# Patient Record
Sex: FEMALE | Race: WHITE | HISPANIC OR LATINO | Employment: UNEMPLOYED | URBAN - METROPOLITAN AREA
[De-identification: names, ages, dates, MRNs, and addresses within clinical notes are randomized per-mention and may not be internally consistent; named-entity substitution may affect disease eponyms.]

---

## 2023-11-20 PROBLEM — Z97.5 USES INTRAUTERINE DEVICE FOR BIRTH CONTROL: Status: ACTIVE | Noted: 2020-05-04

## 2023-11-20 PROBLEM — A56.8 CHLAMYDIA TRACHOMATIS INFECTION IN PREGNANCY: Status: ACTIVE | Noted: 2017-09-27

## 2023-11-20 PROBLEM — O26.849 FETAL SIZE INCONSISTENT WITH DATES: Status: ACTIVE | Noted: 2018-03-21

## 2023-11-20 PROBLEM — O98.319 CHLAMYDIA TRACHOMATIS INFECTION IN PREGNANCY: Status: ACTIVE | Noted: 2017-09-27

## 2023-11-20 PROBLEM — A59.00 UROGENITAL INFECTION BY TRICHOMONAS VAGINALIS: Status: ACTIVE | Noted: 2022-04-04

## 2023-11-20 PROBLEM — O20.0 THREATENED ABORTION IN FIRST TRIMESTER: Status: ACTIVE | Noted: 2017-10-04

## 2023-11-20 NOTE — PATIENT INSTRUCTIONS
If you have any questions regarding your visit, Please contact your care team.    [facility]: [phone]      Women s Health CLINIC HOURS TELEPHONE NUMBER   Bridgett Mcgee DO.    TONY Sawant-Surgery Scheduler  Aster - Surgery Scheduler    JOREG LUIS Alejandro, JORGE LUIS Lutz RN     Monday, Thursday  [place]  7am-3pm    Tuesday, Wednesday  [place]  7am-3pm    E/O Friday &   [place]    Typical Surgery Days: Thursday or Friday   [facility]  [address]  [place], [place] [address] [phone] Phone  [phone] Fax    [facility]  [address] [place] [address] [phone] Phone    Imaging [phone] Phone    [facility]:  [phone] Phone     **Surgeries** Our Surgery Schedulers will contact you to schedule. If you do not receive a call within 3 business days, please call [phone].    Urgent Care locations:  [facility] Saturday and    9 am - 5 pm    Monday-Friday   12 pm - 8 pm  Saturday and    9 am - 5 pm   [phone]       If you need a medication refill, please contact your pharmacy. Please allow 3 business days for your refill to be completed.  As always, Thank you for trusting us with your healthcare needs!

## 2023-11-22 ENCOUNTER — OFFICE VISIT (OUTPATIENT)
Dept: OBGYN | Facility: CLINIC | Age: 23
End: 2023-11-22
Payer: COMMERCIAL

## 2023-11-22 VITALS
DIASTOLIC BLOOD PRESSURE: 76 MMHG | SYSTOLIC BLOOD PRESSURE: 122 MMHG | WEIGHT: 157 LBS | BODY MASS INDEX: 27.82 KG/M2 | HEART RATE: 72 BPM | HEIGHT: 63 IN

## 2023-11-22 DIAGNOSIS — A59.9 TRICHOMONAS INFECTION: Primary | ICD-10-CM

## 2023-11-22 DIAGNOSIS — Z30.432 ENCOUNTER FOR REMOVAL OF INTRAUTERINE CONTRACEPTIVE DEVICE: Primary | ICD-10-CM

## 2023-11-22 DIAGNOSIS — Z12.4 CERVICAL CANCER SCREENING: ICD-10-CM

## 2023-11-22 DIAGNOSIS — Z31.9 PATIENT DESIRES PREGNANCY: ICD-10-CM

## 2023-11-22 DIAGNOSIS — Z11.3 SCREENING FOR STDS (SEXUALLY TRANSMITTED DISEASES): ICD-10-CM

## 2023-11-22 LAB
CLUE CELLS: ABNORMAL
TRICHOMONAS, WET PREP: PRESENT
WBC'S/HIGH POWER FIELD, WET PREP: ABNORMAL
YEAST, WET PREP: ABNORMAL

## 2023-11-22 PROCEDURE — 90471 IMMUNIZATION ADMIN: CPT | Performed by: OBSTETRICS & GYNECOLOGY

## 2023-11-22 PROCEDURE — 99203 OFFICE O/P NEW LOW 30 MIN: CPT | Mod: 25 | Performed by: OBSTETRICS & GYNECOLOGY

## 2023-11-22 PROCEDURE — 87591 N.GONORRHOEAE DNA AMP PROB: CPT | Performed by: OBSTETRICS & GYNECOLOGY

## 2023-11-22 PROCEDURE — 87491 CHLMYD TRACH DNA AMP PROBE: CPT | Performed by: OBSTETRICS & GYNECOLOGY

## 2023-11-22 PROCEDURE — 58301 REMOVE INTRAUTERINE DEVICE: CPT | Performed by: OBSTETRICS & GYNECOLOGY

## 2023-11-22 PROCEDURE — G0145 SCR C/V CYTO,THINLAYER,RESCR: HCPCS | Performed by: OBSTETRICS & GYNECOLOGY

## 2023-11-22 PROCEDURE — 90686 IIV4 VACC NO PRSV 0.5 ML IM: CPT | Performed by: OBSTETRICS & GYNECOLOGY

## 2023-11-22 PROCEDURE — 87210 SMEAR WET MOUNT SALINE/INK: CPT | Performed by: OBSTETRICS & GYNECOLOGY

## 2023-11-22 RX ORDER — METRONIDAZOLE 500 MG/1
500 TABLET ORAL 2 TIMES DAILY
Qty: 14 TABLET | Refills: 0 | Status: SHIPPED | OUTPATIENT
Start: 2023-11-22 | End: 2023-11-29

## 2023-11-22 NOTE — PROGRESS NOTES
SUBJECTIVE:       HPI: Lakeshia Phillips is a 23 year old  new patient who presents today for counseling and removal of contraception.     Has had a Mirena IUD in place for 5  years.  Desires removal for pregnancy planning.        Gyn Hx: No LMP recorded. (Menstrual status: IUD).    Patient is sexually active. One male partner   [place] pap [place]   STI history - hx chlamydia  , , ; trich   STD testing offered?  Accepted  Menarche 13 years old. No bleeding with IUD      OB History    Para Term  AB Living   1 1 1 0 0 1   SAB IAB Ectopic Multiple Live Births   0 0 0 0 1      # Outcome Date GA Lbr Jose/2nd Weight Sex Delivery Anes PTL Lv   1 Term 2018        NAMITA         reports that she has never smoked. She has never used smokeless tobacco.      Today's PHQ-2 Score:       2023     2:21 PM   PHQ-2 (  [employer])   Q1: Little interest or pleasure in doing things 1   Q2: Feeling down, depressed or hopeless 0   PHQ-2 Score 1   Q1: Little interest or pleasure in doing things Several days   Q2: Feeling down, depressed or hopeless Not at all   PHQ-2 Score 1     Today's PHQ-9 Score:        No data to display              Today's ISELA-7 Score:        No data to display                Problem list and histories reviewed & adjusted, as indicated.  Additional history: as documented.    Patient Active Problem List   Diagnosis    Chlamydia trachomatis infection in pregnancy    Fetal size inconsistent with dates    Threatened  in first trimester    Urogenital infection by trichomonas vaginalis    Uses intrauterine device for birth control     Past Surgical History:   Procedure Laterality Date    APPENDECTOMY  2002      Social History     Tobacco Use    Smoking status: Never    Smokeless tobacco: Never   Substance Use Topics    Alcohol use: Not Currently      No data available              No current outpatient medications on file prior to visit.  No current  "facility-administered medications on file prior to visit.    Allergies   Allergen Reactions    Tramadol Other (See Comments)     Anxiety/panic attacks       ROS:  10 Point review of systems negative other noted above in HPI    OBJECTIVE:     /76   Pulse 72   Ht 1.6 m (5' 3\")   Wt 71.2 kg (157 lb)   BMI 27.81 kg/m    Body mass index is 27.81 kg/m .      Gen: Alert, oriented, appropriately interactive, NAD  Chest: Symmetrical, unlabored breathing  External genitalia: no lesions; normal appearing external genitalia, bartholins glands, urethra, skenes glands  Vagina: no masses or lesions or discharge, normally rugated.  Cervix: no masses or lesions or discharge +IUD strings  MSK: normal gait, symmetric movements UE & LE  Lower extremities: non-tender, no edema      In-Clinic Test Results:  No results found for this or any previous visit (from the past 24 hour(s)).    ASSESSMENT/PLAN:                                                      Lakeshia Phillips is a 23 year old  New patient who presents today counseling and initiation of contraception      ICD-10-CM    1. Encounter for removal of intrauterine contraceptive device  Z30.432 REMOVE INTRAUTERINE DEVICE      2. Screening for STDs (sexually transmitted diseases)  Z11.3 Chlamydia trachomatis/Neisseria gonorrhoeae by PCR - Clinic Collect     Wet prep - Clinic Collect      3. Cervical cancer screening  Z12.4 Pap Screen only - recommended age 21 - 24 years      4. Patient desires pregnancy  Z31.9 REMOVE INTRAUTERINE DEVICE          Desires pregnancy and removal of IUD. No issues except some mild cramping recently.    Mirena IUD removed today without complication. Please see procedure note below for more information.    Recommend starting PNV with folic acid. Call with +UPT.    Pap obtained, records from prior screening not available.    Hx STI- repeat screening today.      Bridgett Mcgee,   [facility]      IUD " Removal:  SUBJECTIVE:    Is a pregnancy test required: No.  Was a consent obtained?  Yes    Lakeshia Phillips is a 23 year old female,, No LMP recorded. (Menstrual status: IUD). who presents today for IUD removal.     Today's PHQ-2 Score:       2023     2:21 PM   PHQ-2 (  [employer])   Q1: Little interest or pleasure in doing things 1   Q2: Feeling down, depressed or hopeless 0   PHQ-2 Score 1   Q1: Little interest or pleasure in doing things Several days   Q2: Feeling down, depressed or hopeless Not at all   PHQ-2 Score 1       PROCEDURE:    A speculum exam was performed and the cervix was visualized. The IUD string was visualized. Using ring forceps, the string  was grasped and the IUD removed intact.    POST PROCEDURE:    The patient tolerated the procedure well. Patient was discharged in stable condition.    Call if bleeding, pain or fever occur. and Pregnancy counseling given, including folic acid supplementation 800-1000 mg per day.    Bridgett Mcgee,

## 2023-11-23 LAB
C TRACH DNA SPEC QL PROBE+SIG AMP: POSITIVE
N GONORRHOEA DNA SPEC QL NAA+PROBE: NEGATIVE

## 2023-11-24 ENCOUNTER — TELEPHONE (OUTPATIENT)
Dept: OBGYN | Facility: CLINIC | Age: 23
End: 2023-11-24
Payer: COMMERCIAL

## 2023-11-24 DIAGNOSIS — A74.9 CHLAMYDIA INFECTION: Primary | ICD-10-CM

## 2023-11-24 DIAGNOSIS — A74.9 CHLAMYDIA INFECTION: ICD-10-CM

## 2023-11-24 RX ORDER — AZITHROMYCIN 500 MG/1
1000 TABLET, FILM COATED ORAL DAILY
Qty: 2 TABLET | Refills: 0 | Status: SHIPPED | OUTPATIENT
Start: 2023-11-24 | End: 2024-03-08

## 2023-11-24 RX ORDER — AZITHROMYCIN 500 MG/1
1000 TABLET, FILM COATED ORAL DAILY
Qty: 2 TABLET | Refills: 0 | Status: SHIPPED | OUTPATIENT
Start: 2023-11-24 | End: 2023-11-24

## 2023-11-24 NOTE — TELEPHONE ENCOUNTER
See result note for chlamydia test- RN transferred script per pt request to [facility] and cancelled script at  [facility].    Nelda Vázquez RN on 11/24/2023 at 10:14 AM

## 2023-11-28 LAB
BKR LAB AP GYN ADEQUACY: NORMAL
BKR LAB AP GYN INTERPRETATION: NORMAL
BKR LAB AP GYN OTHER FINDINGS: NORMAL
BKR LAB AP HPV REFLEX: NO
BKR LAB AP PREVIOUS ABNORMAL: NORMAL
PATH REPORT.COMMENTS IMP SPEC: NORMAL
PATH REPORT.COMMENTS IMP SPEC: NORMAL
PATH REPORT.RELEVANT HX SPEC: NORMAL

## 2023-12-31 ENCOUNTER — HEALTH MAINTENANCE LETTER (OUTPATIENT)
Age: 23
End: 2023-12-31

## 2024-01-17 ENCOUNTER — TELEPHONE (OUTPATIENT)
Dept: OBGYN | Facility: CLINIC | Age: 24
End: 2024-01-17
Payer: MEDICAID

## 2024-01-17 NOTE — TELEPHONE ENCOUNTER
LMP 12/25/23  Yesterday took a home pregnancy test.  Would like to schedule first ob appointments.    Patient will call and schedule.

## 2024-02-05 ENCOUNTER — MEDICAL CORRESPONDENCE (OUTPATIENT)
Dept: HEALTH INFORMATION MANAGEMENT | Facility: CLINIC | Age: 24
End: 2024-02-05

## 2024-02-08 ENCOUNTER — VIRTUAL VISIT (OUTPATIENT)
Dept: FAMILY MEDICINE | Facility: CLINIC | Age: 24
End: 2024-02-08

## 2024-02-08 DIAGNOSIS — Z34.90 SUPERVISION OF NORMAL PREGNANCY: Primary | ICD-10-CM

## 2024-02-08 PROCEDURE — 99207 PR NO CHARGE NURSE ONLY: CPT | Mod: 93

## 2024-02-08 NOTE — PATIENT INSTRUCTIONS
Learning About Pregnancy  Your Care Instructions     Your health in the early weeks of your pregnancy is particularly important for your baby's health. Take good care of yourself. Anything you do that harms your body can also harm your baby.  Make sure to go to all of your doctor appointments. Regular checkups will help keep you and your baby healthy.  How can you care for yourself at home?  Diet    Eat a balanced diet. Make sure your diet includes plenty of beans, peas, and leafy green vegetables.     Do not skip meals or go for many hours without eating. If you are nauseated, try to eat a small, healthy snack every 2 to 3 hours.     Do not eat fish that has a high level of mercury, such as shark, swordfish, or mackerel. Do not eat more than one can of tuna each week.     Drink plenty of fluids. If you have kidney, heart, or liver disease and have to limit fluids, talk with your doctor before you increase the amount of fluids you drink.     Cut down on caffeine, such as coffee, tea, and cola.     Do not drink alcohol, such as beer, wine, or hard liquor.     Take a multivitamin that contains at least 400 micrograms (mcg) of folic acid to help prevent birth defects. Fortified cereal and whole wheat bread are good additional sources of folic acid.     Increase the calcium in your diet. Try to drink a quart of skim milk each day. You may also take calcium supplements and choose foods such as cheese and yogurt.   Lifestyle    Make sure you go to your follow-up appointments.     Get plenty of rest. You may be unusually tired while you are pregnant.     Get at least 30 minutes of exercise on most days of the week. Walking is a good choice. If you have not exercised in the past, start out slowly. Take several short walks each day.     Do not smoke. If you need help quitting, talk to your doctor about stop-smoking programs. These can increase your chances of quitting for good.     Do not touch cat feces or litter boxes.  Also, wash your hands after you handle raw meat, and fully cook all meat before you eat it. Wear gloves when you work in the yard or garden, and wash your hands well when you are done. Cat feces, raw or undercooked meat, and contaminated dirt can cause an infection that may harm your baby or lead to a miscarriage.     Avoid things that can make your body too hot and may be harmful to your baby, such as a hot tub or sauna. Or talk with your doctor before doing anything that raises your body temperature. Your doctor can tell you if it's safe.     Avoid chemical fumes, paint fumes, or poisons.     Do not use illegal drugs, marijuana, or alcohol.   Medicines    Review all of your medicines with your doctor. Some of your routine medicines may need to be changed to protect your baby.     Use acetaminophen (Tylenol) to relieve minor problems, such as a mild headache or backache or a mild fever with cold symptoms. Do not use nonsteroidal anti-inflammatory drugs (NSAIDs), such as ibuprofen (Advil, Motrin) or naproxen (Aleve), unless your doctor says it is okay.     Do not take two or more pain medicines at the same time unless the doctor told you to. Many pain medicines have acetaminophen, which is Tylenol. Too much acetaminophen (Tylenol) can be harmful.     Take your medicines exactly as prescribed. Call your doctor if you think you are having a problem with your medicine.   To manage morning sickness    If you feel sick when you first wake up, try eating a small snack (such as crackers) before you get out of bed. Allow some time to digest the snack, and then get out of bed slowly.     Do not skip meals or go for long periods without eating. An empty stomach can make nausea worse.     Eat small, frequent meals instead of three large meals each day.     Drink plenty of fluids.     Eat foods that are high in protein but low in fat.     If you are taking iron supplements, ask your doctor if they are necessary. Iron can make  "nausea worse.     Avoid any smells, such as coffee, that make you feel sick.     Get lots of rest. Morning sickness may be worse when you are tired.   Follow-up care is a key part of your treatment and safety. Be sure to make and go to all appointments, and call your doctor if you are having problems. It's also a good idea to know your test results and keep a list of the medicines you take.  Where can you learn more?  Go to [email or web address]/patiented  Enter E868 in the search box to learn more about \"Learning About Pregnancy.\"  Current as of: July 11, 2023               Content Version: 13.8    4314-4949 [employer].   Care instructions adapted under license by your healthcare professional. If you have questions about a medical condition or this instruction, always ask your healthcare professional. [employer] disclaims any warranty or liability for your use of this information.      Weeks 6 to 10 of Your Pregnancy: Care Instructions  During these weeks of pregnancy, your body goes through many changes. You may start to feel different, both in your body and your emotions. Each pregnancy is different, so there's no \"right\" way to feel. These early weeks are a time to make healthy choices for you and your pregnancy.    Take a daily prenatal vitamin. Choose one with folic acid in it.   Avoid alcohol, tobacco, and drugs (including marijuana). If you need help quitting, talk to your doctor.     Drink plenty of liquids.  Be sure to drink enough water. And limit sodas, other sweetened drinks, and caffeine.     Choose foods that are good sources of calcium, iron, and folate.  You can try dairy products, dark leafy greens, fortified orange juice and cereals, almonds, broccoli, dried fruit, and beans.     Avoid foods that may be harmful.  Don't eat raw meat, deli meat, raw seafood, or raw eggs. Avoid soft cheese and unpasteurized dairy, like Brie and blue cheese. And don't eat fish that " "contains a lot of mercury, like shark and swordfish.     Don't touch collin litter or cat poop.  They can cause an infection that could be harmful during pregnancy.     Avoid things that can make your body too hot.  For example, avoid hot tubs and saunas.     Soothe morning sickness.  Try eating 5 or 6 small meals a day, getting some fresh air, or using gaby to control symptoms.     Ask your doctor about flu and COVID-19 shots.  Getting them can help protect against infection.   Follow-up care is a key part of your treatment and safety. Be sure to make and go to all appointments, and call your doctor if you are having problems. It's also a good idea to know your test results and keep a list of the medicines you take.  Where can you learn more?  Go to [email or web address]  Enter G112 in the search box to learn more about \"Weeks 6 to 10 of Your Pregnancy: Care Instructions.\"  Current as of: July 11, 2023               Content Version: 13.8 2006-2023 [employer].   Care instructions adapted under license by your healthcare professional. If you have questions about a medical condition or this instruction, always ask your healthcare professional. [employer] disclaims any warranty or liability for your use of this information.         Managing Morning Sickness (01:55)  Your health professional recommends that you watch this short online health video.  Learn tips for dealing with morning sickness, no matter what time of day you have it.  Purpose:  Gives tips for managing morning sickness, including eating small low-fat meals and avoiding caffeine and spicy food.  Goal:  The user will learn tips for dealing with morning sickness during pregnancy.     How to watch the video    Scan the QR code   OR Visit the website    [email or web address]   Current as of: July 11, 2023               Content Version: 13.8 2006-2023 [employer].   Care " instructions adapted under license by your healthcare professional. If you have questions about a medical condition or this instruction, always ask your healthcare professional. [employer] disclaims any warranty or liability for your use of this information.      Pregnancy and Heartburn: Care Instructions  Overview     Heartburn is a common problem during pregnancy.  Heartburn happens when stomach acid backs up into the tube that carries food to the stomach. This tube is called the esophagus. Early in pregnancy, heartburn is caused by hormone changes that slow down digestion. Later on, it's also caused by the large uterus pushing up on the stomach.  Even though you can't fix the cause, there are things you can do to get relief. Treating heartburn during pregnancy focuses first on making lifestyle changes, like changing what and how you eat, and on taking medicines.  Heartburn usually improves or goes away after childbirth.  Follow-up care is a key part of your treatment and safety. Be sure to make and go to all appointments, and call your doctor if you are having problems. It's also a good idea to know your test results and keep a list of the medicines you take.  How can you care for yourself at home?  Eat small, frequent meals.  Avoid foods that make your symptoms worse, such as chocolate, peppermint, and spicy foods. Avoid drinks with caffeine, such as coffee, tea, and sodas.  Avoid bending over or lying down after meals.  Take a short walk after you eat.  If heartburn is a problem at night, do not eat for 2 hours before bedtime.  Take antacids like Mylanta, Maalox, Rolaids, or Tums. Do not take antacids that have sodium bicarbonate, magnesium trisilicate, or aspirin. Be careful when you take over-the-counter antacid medicines. Many of these medicines have aspirin in them. While you are pregnant, do not take aspirin or medicines that contain aspirin unless your doctor says it is okay.  If you're not  "getting relief, talk to your doctor. You may be able to take a stronger acid-reducing medicine.  When should you call for help?   Call your doctor now or seek immediate medical care if:    You have new or worse belly pain.     You are vomiting.   Watch closely for changes in your health, and be sure to contact your doctor if:    You have new or worse symptoms of reflux.     You are losing weight.     You have trouble or pain swallowing.     You do not get better as expected.   Where can you learn more?  Go to [email or web address]://[email or web address]/[email or web address]  Enter U946 in the search box to learn more about \"Pregnancy and Heartburn: Care Instructions.\"  Current as of: July 11, 2023               Content Version: 13.8    9070-6495 [employer]   Care instructions adapted under license by your healthcare professional. If you have questions about a medical condition or this instruction, always ask your healthcare professional. [employer]([facility] disclaims any warranty or liability for your use of this information.      Constipation: Care Instructions  Overview     Constipation means that you have a hard time passing stools (bowel movements). People pass stools from 3 times a day to once every 3 days. What is normal for you may be different. Constipation may occur with pain in the rectum and cramping. The pain may get worse when you try to pass stools. Sometimes there are small amounts of bright red blood on toilet paper or the surface of stools. This is because of enlarged veins near the rectum (hemorrhoids).  A few changes in your diet and lifestyle may help you avoid ongoing constipation. Your doctor may also prescribe medicine to help loosen your stool.  Some medicines can cause constipation. These include pain medicines and antidepressants. Tell your doctor about all the medicines you take. Your doctor may want to make a medicine change to ease your symptoms.  Follow-up care is a key part of your treatment " "and safety. Be sure to make and go to all appointments, and call your doctor if you are having problems. It's also a good idea to know your test results and keep a list of the medicines you take.  How can you care for yourself at home?  Drink plenty of fluids. If you have kidney, heart, or liver disease and have to limit fluids, talk with your doctor before you increase the amount of fluids you drink.  Include high-fiber foods in your diet each day. These include fruits, vegetables, beans, and whole grains.  Get at least 30 minutes of exercise on most days of the week. Walking is a good choice. You also may want to do other activities, such as running, swimming, cycling, or playing tennis or team sports.  Take a fiber supplement, such as Citrucel or Metamucil, every day. Read and follow all instructions on the label.  Schedule time each day for a bowel movement. A daily routine may help. Take your time having a bowel movement, but don't sit for more than 10 minutes at a time. And don't strain too much.  Support your feet with a small step stool when you sit on the toilet. This helps flex your hips and places your pelvis in a squatting position.  Your doctor may recommend an over-the-counter laxative to relieve your constipation. Examples are Milk of Magnesia and MiraLax. Read and follow all instructions on the label. Do not use laxatives on a long-term basis.  When should you call for help?   Call your doctor now or seek immediate medical care if:    You have new or worse belly pain.     You have new or worse nausea or vomiting.     You have blood in your stools.   Watch closely for changes in your health, and be sure to contact your doctor if:    Your constipation is getting worse.     You do not get better as expected.   Where can you learn more?  Go to https://www.healthwise.net/patiented  Enter P343 in the search box to learn more about \"Constipation: Care Instructions.\"  Current as of: March 21, " "2023               Content Version: 13.8 2006-2023 [employer].   Care instructions adapted under license by your healthcare professional. If you have questions about a medical condition or this instruction, always ask your healthcare professional. [employer] disclaims any warranty or liability for your use of this information.      Learning About High-Iron Foods  What foods are high in iron?     The foods you eat contain nutrients, such as vitamins and minerals. Iron is a nutrient. Your body needs the right amount to stay healthy and work as it should. You can use the list below to help you make choices about which foods to eat.  Here are some foods that contain iron. They have 1 to 2 milligrams of iron per serving.  Fruits  Figs (dried), 5 figs  Vegetables  Asparagus (canned), 6 bo  Juanis, beet, Swiss chard, or turnip greens, 1 cup  Dried peas, cooked,   cup  Seaweed, spirulina (dried),   cup  Spinach, (cooked)   cup or (raw) 1 cup  Grains  Cereals, fortified with iron, 1 cup  Grits (instant, cooked), fortified with iron,   cup  Meats and other protein foods  Beans (kidney, lima, navy, white), canned or cooked,   cup  Beef or lamb, 3 oz  Chicken giblets, 3 oz  Chickpeas (garbanzo beans),   cup  Liver of beef, lamb, or pork, 3 oz  Oysters (cooked), 3 oz  Sardines (canned), 3 oz  Soybeans (boiled),   cup  Tofu (firm),   cup  Work with your doctor to find out how much of this nutrient you need. Depending on your health, you may need more or less of it in your diet.  Where can you learn more?  Go to [email or web address]/[email or web address]  Enter R005 in the search box to learn more about \"Learning About High-Iron Foods.\"  Current as of: February 28, 2023               Content Version: 13.8 2006-2023 [employer].   Care instructions adapted under license by your healthcare professional. If you have questions about a medical condition or this instruction, always ask your " "healthcare professional. [employer], [facility] disclaims any warranty or liability for your use of this information.      Rh Antibodies Screening During Pregnancy: About This Test  What is it?     The Rh antibodies screening test is a blood test. It checks your blood for Rh antibodies. If you have Rh-negative blood and have been exposed to Rh-positive blood, your immune system may make antibodies to attack the Rh-positive blood. When a pregnant woman has these antibodies, it is called Rh sensitization.  Why is this test done?  The Rh antibodies screening test is done during pregnancy to find out if your baby is at risk for Rh disease. This can happen if you have Rh-negative blood and your baby has Rh-positive blood. If your Rh-negative blood mixes with Rh-positive blood, your immune system will make antibodies to attack the Rh-positive blood.  During pregnancy, these antibodies could attach to the baby's red blood cells. This can cause your baby to have serious health problems. The results of this test will help your doctor know how to best care for you and your baby during your pregnancy.  How do you prepare for the test?  In general, there's nothing you have to do before this test, unless your doctor tells you to.  How is the test done?  A health professional uses a needle to take a blood sample, usually from the arm.  What happens after the test?  You will probably be able to go home right away. It depends on the reason for the test.  You can go back to your usual activities right away.  Follow-up care is a key part of your treatment and safety. Be sure to make and go to all appointments, and call your doctor if you are having problems. It's also a good idea to keep a list of the medicines you take. Ask your doctor when you can expect to have your test results.  Where can you learn more?  Go to https://www.[employer].net/patiented  Enter P722 in the search box to learn more about \"Rh Antibodies Screening " "During Pregnancy: About This Test.\"  Current as of: 2023               Content Version: 13.8    0360-7292 [employer].   Care instructions adapted under license by your healthcare professional. If you have questions about a medical condition or this instruction, always ask your healthcare professional. [employer] disclaims any warranty or liability for your use of this information.      Learning About Preventing Rh Disease  What is Rh disease?     Rh disease can be a serious problem in pregnancy. It happens when substances called antibodies in the mother's blood cause red blood cells in her baby's blood to be destroyed. This can occur when the blood types of a mother and her baby do not match.  All blood has an Rh factor. This is what makes a blood type positive or negative. When you are Rh-negative, your baby may be Rh-negative or Rh-positive. If your baby has Rh-positive blood and it mixes with yours, your body will make antibodies. This is called Rh sensitization.  Most of the time, this is not a problem in a first pregnancy. But in future pregnancies, it could cause Rh disease.  A  with Rh disease has mild anemia and may have jaundice. In severe cases, anemia, jaundice, and swelling can be very dangerous or fatal. Some babies need to be delivered early. Some need special care in the NICU. A very sick baby will need a blood transfusion before or after birth.  Fortunately, Rh sensitization is usually easy to prevent.  That's why it's important to get your Rh status checked in your first trimester. It doesn't cause any warning signs. A blood test is the only way to know if you are Rh-sensitive or are at risk for it.  How can you prevent Rh disease?  If you are Rh-negative, your doctor gives you an Rh immune globulin shot (such as RhoGAM). It helps prevent your body from making the antibodies that attack your baby's red blood cells.  Timing is important. You need the shot " "at certain times during your pregnancy. And you need one anytime there is a chance that your baby's blood might mix with yours. That can happen with certain prenatal tests or when you have pregnancy bleeding, such as:  Right after any pregnancy loss, amniocentesis, or CVS testing.  After turning of a breech baby.  Before and maybe after childbirth. Your doctor gives you a shot around week 28. If your  is Rh-positive, you will have another shot.  Follow-up care is a key part of your treatment and safety. Be sure to make and go to all appointments, and call your doctor if you are having problems. It's also a good idea to know your test results and keep a list of the medicines you take.  Where can you learn more?  Go to https://[email or web address]  Enter W177 in the search box to learn more about \"Learning About Preventing Rh Disease.\"  Current as of: 2023               Content Version: 13.8    9681-5781 [employer].   Care instructions adapted under license by your healthcare professional. If you have questions about a medical condition or this instruction, always ask your healthcare professional. [employer] disclaims any warranty or liability for your use of this information.      Learning About Rh Immunoglobulin Shots  Introduction     An Rh immunoglobulin shot is given to pregnant women who have Rh-negative blood.  You may have Rh-negative blood, and your baby may have Rh-positive blood. If the two types of blood mix, your body will make antibodies. This is called Rh sensitization. Most of the time, this is not a problem the first time you're pregnant. But it could cause problems in future pregnancies.  This shot keeps your body from making the antibodies. You get the shot around 28 weeks of pregnancy. After the birth, your baby's blood is tested. If the blood is Rh positive, you will get another shot. You may also get the shot if you have vaginal bleeding while " "you are pregnant or if you have a miscarriage. These shots protect future pregnancies.  Women with Rh negative blood will need this shot each time they get pregnant.  Example  Rh immunoglobulin (HypRho-D, MICRhoGAM, and RhoGAM)  Possible side effects  Rare side effects may include:  Some mild pain where you got the shot.  A slight fever.  An allergic reaction.  You may have other side effects not listed here. Check the information that comes with your medicine.  What to know about taking this medicine  You may need more than one shot. You may need the shot again:  After amniocentesis, fetal blood sampling, or chorionic villus sampling tests.  If you have bleeding in your second or third trimester.  After turning of a breech baby.  After an injury to the belly while you are pregnant.  After a miscarriage or an .  Before or right after treatment for an ectopic or a partial molar pregnancy.  Tell your doctor if you have any allergies or have had a bad response to medicines in the past.  If you get this shot within 3 months of getting a live-virus vaccine, the vaccine may not work. Your doctor will tell you if you need more vaccine.  Check with your doctor or pharmacist before you use any other medicines. This includes over-the-counter medicines. Make sure your doctor knows all of the medicines, vitamins, herbs, and supplements you take. Taking some medicines at the same time can cause problems.  Where can you learn more?  Go to https://www.[employer].net/patiented  Enter V615 in the search box to learn more about \"Learning About Rh Immunoglobulin Shots.\"  Current as of: 2023               Content Version: 13.8    9938-1167 [employer].   Care instructions adapted under license by your healthcare professional. If you have questions about a medical condition or this instruction, always ask your healthcare professional. [employer] disclaims any warranty or liability for your use " of this information.      Rubella (Cro[place] Measles): Care Instructions  Overview  Rubella, also called Croatian measles or 3-day measles, is a disease caused by a virus. It spreads by coughs, sneezes, and close contact. Rubella usually is mild and does not cause long-term problems. But if you are pregnant and get it, you can give the disease to your unborn baby. This can cause serious birth defects.  While you have rubella, you may get a rash and a mild fever, and the lymph glands in your neck may swell. Older children often have a fever, eye pain, a sore throat, and body aches. You can relieve most symptoms with care at home. Avoid being around others, especially pregnant people, until your rash has been gone for at least 4 days. People who have not had this disease before or have not had the vaccine have the greatest chance of getting the virus.  Follow-up care is a key part of your treatment and safety. Be sure to make and go to all appointments, and call your doctor if you are having problems. It's also a good idea to know your test results and keep a list of the medicines you take.  How can you care for yourself at home?  Drink plenty of fluids. If you have kidney, heart, or liver disease and have to limit fluids, talk with your doctor before you increase the amount of fluids you drink.  Get plenty of rest to help your body heal.  Take an over-the-counter pain medicine, such as acetaminophen (Tylenol), ibuprofen (Advil, Motrin), or naproxen (Aleve), to reduce fever and discomfort. Read and follow all instructions on the label. Do not give aspirin to anyone younger than 20. It has been linked to Reye syndrome, a serious illness.  Do not take two or more pain medicines at the same time unless the doctor told you to. Many pain medicines have acetaminophen, which is Tylenol. Too much acetaminophen (Tylenol) can be harmful.  Try not to scratch the rash. Put cold, wet cloths on the rash to reduce itching.  Do not smoke.  "Smoking can make your symptoms worse. If you need help quitting, talk to your doctor about stop-smoking programs and medicines. These can increase your chances of quitting for good.  Avoid contact with people who have never had rubella and who have not been immunized.  When should you call for help?   Call your doctor now or seek immediate medical care if:    You have a fever with a stiff neck or a severe headache.     You are sensitive to light or feel very sleepy or confused.   Watch closely for changes in your health, and be sure to contact your doctor if:    You do not get better as expected.   Where can you learn more?  Go to https://www.[employer].net/patiented  Enter B812 in the search box to learn more about \"Rubella (Arabic Measles): Care Instructions.\"  Current as of: 2023               Content Version: 13.8    0302-8490 [employer].   Care instructions adapted under license by your healthcare professional. If you have questions about a medical condition or this instruction, always ask your healthcare professional. [employer] disclaims any warranty or liability for your use of this information.      Gonorrhea and Chlamydia: About These Tests  What is it?  These tests use a sample of urine or other body fluid to look for the bacteria that cause these sexually transmitted infections (STIs). The fluid sample can come from the cervix, vagina, rectum, throat, or eyes.  Why is this test done?  These tests may be done to:  Find out if symptoms are caused by gonorrhea or chlamydia.  Check people who are at high risk of being infected with gonorrhea or chlamydia.  Retest people several months after they have been treated for gonorrhea or chlamydia.  Check for infection in your  if you had a gonorrhea or chlamydia infection at the time of delivery.  How can you prepare for the test?  If you are going to have a urine test, do not urinate for at least 1 hour before the " "test.  If you think you may have chlamydia or gonorrhea, don't have sexual intercourse until you get your test results. And you may want to have tests for other STIs, such as HIV.  How is the test done?  For a direct sample, a swab is used to collect body fluid from the cervix, vagina, rectum, throat, or eyes. Your doctor may collect the sample. Or you may be given instructions on how to collect your own sample.  For a urine sample, you will collect the urine that comes out when you first start to urinate. Don't wipe the genital area clean before you urinate.  How long does the test take?  The test will take a few minutes.  What happens after the test?  You will be able to go home right away.  You can go back to your usual activities right away.  If you do have an infection, don't have sexual intercourse for 7 days after you start treatment. And your sex partner(s) should also be treated.  Follow-up care is a key part of your treatment and safety. Be sure to make and go to all appointments, and call your doctor if you are having problems. It's also a good idea to keep a list of the medicines you take. Ask your doctor when you can expect to have your test results.  Where can you learn more?  Go to https://www.[email or web address]/patiented  Enter K976 in the search box to learn more about \"Gonorrhea and Chlamydia: About These Tests.\"  Current as of: April 19, 2023               Content Version: 13.8    2373-9615 [employer].   Care instructions adapted under license by your healthcare professional. If you have questions about a medical condition or this instruction, always ask your healthcare professional. [employer] disclaims any warranty or liability for your use of this information.      Trichomoniasis: About This Test  What is it?     This test uses a sample of urine or other body fluid to look for the tiny parasite that causes trichomoniasis (also called trich). The fluid sample can come " from the vagina, cervix, or urethra. Your doctor may choose to use one or more of many available tests.  Why is it done?  A trich test may be done to:  Find out if symptoms are caused by trich.  Check people who are at high risk for being infected with trich.  Check after treatment to make sure that the infection is gone.  How do you prepare for the test?  If you are going to have a urine test, do not urinate for at least 1 hour before the test.  How is the test done?  For a direct sample, a swab is used to collect body fluid from the cervix, vagina, or urethra. Your doctor may collect the sample. Or you may be given instructions on how to collect your own sample.  For a urine sample, you will collect the urine that comes out when you first start to urinate. Don't wipe the area clean before you urinate.  How long does the test take?  It will take a few minutes to collect a sample.  What happens after the test?  You can go home right away.  You can go back to your usual activities right away.  You may get the test results the same day or several days later. It depends on the test used.  If you do have an infection, don't have sexual intercourse for 7 days after you start treatment. Your sex partner(s) should also be treated.  Follow-up care is a key part of your treatment and safety. Be sure to make and go to all appointments, and call your doctor if you are having problems. Ask your doctor when you can expect to have your test results.  Current as of: April 19, 2023               Content Version: 13.8    5814-3599 [employer].   Care instructions adapted under license by your healthcare professional. If you have questions about a medical condition or this instruction, always ask your healthcare professional. [employer] disclaims any warranty or liability for your use of this information.      HIV Testing: Care Instructions  Overview  You can get tested for the human immunodeficiency virus  "(HIV). Most doctors use a blood test to check for HIV antibodies and antigens in your blood. It may also check for the genetic material (RNA) of HIV. Some tests use saliva to check for HIV antibodies. But these aren't as accurate. For example, they may give a false result if you've just been infected.  What do the results mean?    Normal (negative)    No HIV antibodies, antigens, or RNA were found.  You may need more testing. It can make sure your test results are correct.    Uncertain (indeterminate)    Test results didn't clearly show if you have an HIV infection.  HIV antibodies or antigens may not have formed yet.  Some other type of antibody or antigen may have affected the results.  You will need another test to be sure.    Abnormal (positive)    HIV antibodies, antigens, or RNA were found.  If you haven't had an RNA test yet, one will be done. If it's positive, you have HIV.  If your test result is positive, your doctor will talk to you. You will discuss starting treatment.  Follow-up care is a key part of your treatment and safety. Be sure to make and go to all appointments, and call your doctor if you are having problems. It's also a good idea to know your test results and keep a list of the medicines you take.  Where can you learn more?  Go to https://www.[employer].net/patiented  Enter T792 in the search box to learn more about \"HIV Testing: Care Instructions.\"  Current as of: June 13, 2023               Content Version: 13.8    0084-9115 [employer].   Care instructions adapted under license by your healthcare professional. If you have questions about a medical condition or this instruction, always ask your healthcare professional. [employer] disclaims any warranty or liability for your use of this information.      Hepatitis C Virus Tests: About These Tests  What are they?     Hepatitis C virus tests are blood tests that check for substances in the blood that show whether you " "have hepatitis C now or had it in the past. The tests can also tell you what type of hepatitis C you have and how severe the disease is. This can help your doctor with treatment.  If the tests show that you have long-term hepatitis C, you need to take steps to prevent spreading the disease.  Why are these tests done?  You may need these tests if:  You have symptoms of hepatitis.  You may have been exposed to the virus. You are more likely to have been exposed to the virus if you inject drugs or are exposed to body fluids (such as if you are a health care worker).  You've had other tests that show you have liver problems.  You are 18 to 79 years old.  You have an HIV infection.  The tests also are done to help your doctor decide about your treatment and see how well it works.  How do you prepare for the test?  In general, there's nothing you have to do before this test, unless your doctor tells you to.  How is the test done?  A health professional uses a needle to take a blood sample, usually from the arm.  What happens after these tests?  You will probably be able to go home right away.  You can go back to your usual activities right away.  Follow-up care is a key part of your treatment and safety. Be sure to make and go to all appointments, and call your doctor if you are having problems. It's also a good idea to keep a list of the medicines you take. Ask your doctor when you can expect to have your test results.  Where can you learn more?  Go to https://www.[employer].net/patiented  Enter W551 in the search box to learn more about \"Hepatitis C Virus Tests: About These Tests.\"  Current as of: June 13, 2023               Content Version: 13.8    3540-6842 [employer].   Care instructions adapted under license by your healthcare professional. If you have questions about a medical condition or this instruction, always ask your healthcare professional. [employer] disclaims any warranty or " liability for your use of this information.      Learning About Fetal Ultrasound Results  What is a fetal ultrasound?     Fetal ultrasound is a test that lets your doctor see an image of your baby. Your doctor learns information about your baby from this picture. You may find out, for example, if you are having a boy or a girl. But the main reason you have this test is to get information about your baby's health.  (You may hear your baby called a fetus. This is a common medical term for a baby that's growing in the mother's uterus.)  What kind of information can you learn from this test?  The findings of an ultrasound fall into two categories, normal and abnormal.  Normal  The fetus is the right size for its age.  The placenta is the expected size and does not cover the cervix.  There is enough amniotic fluid in the uterus.  No birth defects can be seen.  Abnormal  The fetus is small or large for its age.  The placenta covers the cervix.  There is too much or too little amniotic fluid in the uterus.  The fetus may have a birth defect.  What does an abnormal result mean?  Abnormal seems to imply that something is wrong with your baby. But what it means is that the test has shown something the doctor wants to take a closer look at.  And that's what happens next. Your doctor will talk to you about what further test or tests you may need.  What do the results mean?  Some of the things your doctor may see on an abnormal ultrasound include:  Echogenic bowel.  The bowel looks very bright on the screen. This could mean that there's blood in the bowel. Or it could mean that something is blocking the small bowel.  Increased nuchal translucency.  The ultrasound measures the thickness at the back of the baby's neck. An increase in thickness is sometimes an early sign of Down syndrome.  Increased or decreased amniotic fluid.  The doctor will look for a reason for the level of amniotic fluid and will watch the pregnancy closely  "as it progresses.  Large ventricles.  Ventricles in the brain look larger than they should. Your doctor may take a closer look at the brain.  Renal pyelectasis/hydronephrosis.  The ultrasound measures the fluid around the kidney. If there is more fluid than expected, there is a chance of urinary tract or kidney problems.  Short long bones.  The ultrasound measures certain arm and leg bones. A long bone (humerus or femur) that is shorter than average could be a sign of Down syndrome.  Subchorionic hemorrhage.  An ultrasound can show bleeding under one of the membranes that surrounds the fetus. Some women don't have symptoms of bleeding. The ultrasound can find this problem when women are not bleeding from their vagina. Women who have this condition have a slightly higher chance of miscarriage.  What do you do now?  Take a deep breath, and let it out. Keep in mind that an abnormal finding on an ultrasound, after it's coupled with more information, may:  Turn out to be nothing.  Turn out to be something mild that won't affect the baby.  Turn out to be something more serious. But if this happens, early diagnosis helps you and your doctor plan treatment options sooner rather than later.  Your medical team is there for you. So are your family and friends. Ask questions, and get the help and support you need.  Follow-up care is a key part of your treatment and safety. Be sure to make and go to all appointments, and call your doctor if you are having problems. It's also a good idea to know your test results and keep a list of the medicines you take.  Where can you learn more?  Go to https://www.[employer].net/patiented  Enter K451 in the search box to learn more about \"Learning About Fetal Ultrasound Results.\"  Current as of: July 11, 2023               Content Version: 13.8    8248-1230 [employer].   Care instructions adapted under license by your healthcare professional. If you have questions about a medical " condition or this instruction, always ask your healthcare professional. [employer], [facility] disclaims any warranty or liability for your use of this information.      Learning About Prenatal Visits  Overview     Regular prenatal visits are very important during any pregnancy. These quick office visits may seem simple and routine. But they can help you have a safe and healthy pregnancy. Your doctor is watching for problems that can only be found through regular checkups. The visits also give you and your doctor time to build a good relationship.  It's common to see your doctor every 4 weeks until week 28 of pregnancy. Then the visits will happen more often. From weeks 28 to 36, it's common to have visits every 2 to 3 weeks. In the final month of pregnancy, you likely will see your doctor every week. Your doctor may want to see you more or less often, depending on your health, your age, and if you've had a normal, full-term pregnancy before.  At different times in your pregnancy, you will have exams and tests. Some are routine. Others are done only when there is a chance of a problem. Everything healthy you do for your body helps you have a healthy pregnancy. Rest when you need it. Eat well, drink plenty of water, and exercise regularly.  What happens during a prenatal visit?  You will have blood pressure checks, along with urine tests. You also may have blood tests. If you need to go to the bathroom while waiting for the doctor, tell the nurse. You will be given a sample cup so your urine can be tested.  You will be weighed and have your belly measured.  Your doctor may listen to the fetal heartbeat with a special device.  At about 24 weeks, and possibly earlier in your pregnancy, your doctor will check your blood sugar (glucose tolerance test) for diabetes that can occur during pregnancy. This is gestational diabetes, which can be harmful.  You will have tests to check for infections that could harm your  ". These include group B streptococcus and hepatitis B.  Your doctor may do ultrasounds to check for problems. This also checks the position of the fetus. An ultrasound uses sound waves to produce a picture of the fetus.  You may get your vaccines updated.  Your doctor may ask you questions to check for signs of anxiety or depression. Tell your doctor if you feel sad, anxious, or hopeless for more than a few days.  You may have other tests at any time during your pregnancy.  Use your visits to discuss with your doctor any concerns you have.  How can you care for yourself at home?  Get plenty of rest.  Try to exercise every day, if your doctor says it is okay. If you have not exercised in the past, start out slowly. For example, you can take short walks each day.  Choose healthy foods, such as fruits, vegetables, whole grains, lean proteins, low-fat dairy, and healthy fats.  Drink plenty of fluids. Cut down on drinks with caffeine, such as coffee, tea, and cola. If you have kidney, heart, or liver disease and have to limit fluids, talk with your doctor before you increase the amount of fluids you drink.  Try to avoid chemical fumes, paint fumes, and poisons.  If you smoke, vape, or use alcohol, marijuana, or other drugs, quit or cut back as much as you can. Talk to your doctor if you need help quitting.  Review all of your medicines, including over-the-counter medicines and supplements, with your doctor. Some of your routine medicines may need to be changed. Do not stop or start taking any medicines without talking to your doctor first.  Follow-up care is a key part of your treatment and safety. Be sure to make and go to all appointments, and call your doctor if you are having problems. It's also a good idea to know your test results and keep a list of the medicines you take.  Where can you learn more?  Go to https://www.healthwise.net/patiented  Enter J502 in the search box to learn more about \"Learning About " "Prenatal Visits.\"  Current as of: July 11, 2023               Content Version: 13.8    6548-7143 [employer].   Care instructions adapted under license by your healthcare professional. If you have questions about a medical condition or this instruction, always ask your healthcare professional. [employer] disclaims any warranty or liability for your use of this information.      Intimate Partner Violence: Care Instructions  Overview     If you want to save this information but don't think it is safe to take it home, see if a trusted friend can keep it for you. Plan ahead. Know who you can call for help, and memorize the phone number.   Be careful online too. Your online activity may be seen by others. Do not use your personal computer or device to read about this topic. Use a safe computer, such as one at work, a friend's home, or a library.    Intimate partner violence--a type of domestic abuse--is different from an argument now and then. It is a pattern of abuse that one person may use to control another person's behavior. It may start with threats and name-calling. Then, it may lead to more serious acts, like pushing and slapping. The abuse also may occur in other areas. For example, the abuser may withhold money or spend a partner's money without their knowledge.  Abuse can cause serious harm. You are more likely to have a long-term health problem from the injuries and stress of living in a violent relationship. People who are sexually abused by their partners have more sexually transmitted infections and unplanned pregnancies. Anyone who is abused also faces emotional pain. Anyone can be abused in relationships. In some relationships, both people use abusive behavior.  If you are pregnant, abuse can cause problems such as poor weight gain, infections, and bleeding. Abuse during this time may increase your baby's risk of low birth weight, premature birth, and death.  Follow-up care is a " "key part of your treatment and safety. Be sure to make and go to all appointments, and call your doctor if you are having problems. It's also a good idea to know your test results and keep a list of the medicines you take.  How can you care for yourself at home?  If you do not have a safe place to stay, discuss this with your doctor before you leave.  Have a plan for where to go, how to leave your home, and where to stay in case of an emergency. Do not tell your partner about your plan. Contact:  The National Domestic Violence Hotline toll-free at 1-530.122.8815. They can help you find resources in your area.  Your local police department, hospital, or clinic for information about shelters and safe homes near you.  Talk to a trusted friend or neighbor, a counselor, or a yordy leader. Do not feel that you have to hide what happened.  Teach your children how to call for help in an emergency.  Be alert to warning signs, such as threats, heavy alcohol use, or drug use. This can help you avoid danger.  If you can, make sure that there are no guns or other weapons in your home.  When should you call for help?   Call 911 anytime you think you may need emergency care. For example, call if:    You or someone else has just been abused.     You think you or someone else is in danger of being abused.   Watch closely for changes in your health, and be sure to contact your doctor if you have any problems.  Where can you learn more?  Go to https://www.[employer].net/patiented  Enter G282 in the search box to learn more about \"Intimate Partner Violence: Care Instructions.\"  Current as of: June 25, 2023               Content Version: 13.8    4880-9472 [employer].   Care instructions adapted under license by your healthcare professional. If you have questions about a medical condition or this instruction, always ask your healthcare professional. [employer] disclaims any warranty or liability for your use " of this information.      Intimate Partner Violence Safety Instructions: Care Instructions  Overview     If you want to save this information but don't think it is safe to take it home, see if a trusted friend can keep it for you. Plan ahead. Know who you can call for help, and memorize the phone number.   Be careful online too. Your online activity may be seen by others. Do not use your personal computer or device to read about this topic. Use a safe computer, such as one at work, a friend's home, or a library.    When you are abused by a spouse or partner, you can take actions to protect yourself and your children.  You can increase your safety whether you decide to stay with your spouse or partner or you decide to leave. You may want to make a safety plan and pack a bag ahead of time. This will help you leave quickly when you decide to. Remember, you cannot change your partner's actions, but you can find help for you and your children. No one deserves to be abused.  Follow-up care is a key part of your treatment and safety. Be sure to make and go to all appointments, and call your doctor if you are having problems. It's also a good idea to know your test results and keep a list of the medicines you take.  How can you care for yourself at home?  Make a plan for your safety   If you decide to stay with your abusive spouse or partner, you can do the following to increase your safety:  Decide what works best to keep you safe in an emergency.  Know who you can call to help you in an emergency.  Decide if you will call the police if you get hurt again. If you can, agree on a signal with your children or neighbor to call the police for you if you need help. You can flash lights or hang something out of a window.  Choose a safe place to go for a short time if you need to leave home. Memorize the address and phone number.  Learn escape routes out of your home in case you need to leave in a hurry. Teach your children  different ways to get out of your home quickly if they need to.  If you can, hide or lock up things that can be used as weapons (guns, knives, hammers).  Learn the number of a domestic violence shelter. Talk to the people there about how they can help.  Find out about other community resources that can help you.  Take pictures of bruises or other injuries if you can. You can also take pictures of things your abuser has broken.  Teach your children that violence is never okay. Tell them that they do not deserve to be hurt.  Pack a bag   Prepare a bag with things you will need if you leave suddenly. Leave it with a friend, a relative, or someone else you trust. You could include the following items in the bag:  A set of keys to your home and car.  Emergency phone numbers and addresses.  Money such as cash or checks. You can also ask a friend, a relative, or someone else you trust to hold money for you.  Copies of legal documents such as house and car titles or rent receipts, birth certificates, Social Security card, voter registration, marriage and 's licenses, and your children's health records.  Personal items you would need for a few days, such as clothes, a toothbrush, toothpaste, and any medicines you or your children need.  A favorite toy or book for your child or children.  Diapers and bottles, if you have very young children.  Pictures that show signs of abuse and violence. You may also add pictures of your abuser.  If you leave   If you decide to leave, you can take the following steps:  Go to the emergency room at a hospital if you have been hurt.  Think about asking the police to be with you as you leave. They can protect you as you leave your home.  If you decide to leave secretly, remember that activities can be tracked. Your abuser may still have access to your cell phone, email, and credit cards. It may be possible for these to be traced. Always be aware of your surroundings.  If this is an  "emergency, do not worry about gathering up anything. Just leave--your safety is most important.  If your abuser moves out, change the locks on the doors. If you have a security system, change the access code.  When should you call for help?   Call 911 anytime you think you may need emergency care. For example, call if:    You or someone else has just been abused.     You think you or someone else is in danger of being abused.   Watch closely for changes in your health, and be sure to contact your doctor if you have any problems.  Where can you learn more?  Go to https://www.SetuServ.net/patiented  Enter A752 in the search box to learn more about \"Intimate Partner Violence Safety Instructions: Care Instructions.\"  Current as of: June 25, 2023               Content Version: 13.8    7143-0721 [employer].   Care instructions adapted under license by your healthcare professional. If you have questions about a medical condition or this instruction, always ask your healthcare professional. [employer] disclaims any warranty or liability for your use of this information.      Learning About Intimate Partner Violence  What is intimate partner violence?  Intimate partner violence is a type of domestic abuse. It's threatening, emotionally harmful, or violent behavior in a personal relationship. It can happen between past or current partners or spouses. In some relationships both people abuse each other. One partner may be more abusive. Or the abuse may be equal.  Abuse can affect people of any ethnic group, race, or Yazidi. It can affect teens, adults, or the elderly. And it can happen to people of any sexual orientation, gender, or social status.  Abusers use fear, bullying, and threats to control their partners. They may control what their partners do. They may control where their partners go or who they see. They may act jealous, controlling, or possessive. These early signs of abuse may " happen soon after the start of the relationship. Sometimes it can be hard to notice abuse at first. But after the relationship becomes more serious, the abuse may get worse.  If you are being abused in your relationship, it's important to get help. The abuse is not your fault. You don't have to face it alone.  Be careful  It may not be safe to take home domestic abuse information like this handout. Some people ask a trusted friend to keep it for them. It's also important to plan ahead and to memorize the phone number of places you can go for help. If you are concerned about your safety, do not use your computer, smartphone, or tablet to read about domestic abuse.   What are the types of intimate partner violence?  Abuse can happen in different ways. Each type can happen on its own or in combination with others.  Emotional abuse  Emotional abuse is a pattern of threats, insults, or controlling behavior. It includes verbal abuse. It goes beyond healthy disagreements in a relationship. It's a sign of an unhealthy relationship.  Do you feel threatened, intimidated, or controlled?  Does your partner:  Threaten your children, other family members, or pets?  Use jokes meant to embarrass or shame you?  Call you names?  Tell you that you are a bad parent?  Threaten to take away your children?  Threaten to have you or your family members deported?  Control your access to money or other basic needs?  Control what you do, who you see or talk to, or where you go?  Another form of emotional abuse is denying that it is happening. Or the abuser may act like the abuse is no big deal or is your fault.  Sexual abuse  With sexual abuse, abusers may try to convince or force you to have sex. They may force you into sex acts you're not comfortable with. Or they may sexually assault you. Sexual abuse can happen even if you are in a committed relationship.  Physical abuse  Physical abuse means that a partner hits, kicks, or does something  else to physically hurt you. Physical abuse that starts with a slap might lead to kicking, shoving, and choking over time. The abuser may also threaten to hurt or kill you.  Stalking  Stalking means that an abuser gives you attention that you do not want and that causes you fear. Examples of stalking include:  Following you.  Showing up at places where the abuser isn't invited, such as at your work or school.  Constantly calling or texting you.  What problems can  to?  Intimate partner violence can be very dangerous. It can cause serious, repeated injury. It can even lead to death.  All forms of abuse can cause long-term health problems from the stress of a violent relationship. Verbal abuse can lead to sexual and physical abuse.  Abuse causes:  Emotional pain.  Depression.  Anxiety.  Post-traumatic stress.  Sexual abuse can lead to sexually transmitted infections (such as HIV/AIDS) and unplanned pregnancy.  Pregnancy can be a very dangerous time for people in abusive relationships. Abuse can cause or increase the risk of problems during pregnancy. These include low weight gain, anemia, infections, and bleeding. Abuse may also increase your baby's risk of low birth weight, premature birth, and death.  It can be hard for some victims of abuse to ask for help or to leave their relationship. You may feel scared, stuck, or not sure what steps to take. But it's important not to ignore abuse. Talking to someone you trust could be the first step to ending the abuse and taking care of your own health and happiness again. There are resources available that can help keep you safe.  Where can you get help?  Talk to a trusted friend. Find a local advocacy group, or talk to your doctor about the abuse.  Contact the National Domestic Violence Hotline at 2-042-379-GPSM (1-649.169.3328) for more safety tips. They can guide you to groups in your area that can help. Or go to the National Coalition Against Domestic Violence  "website at www.Leo.CliniCast to learn more.  Domestic violence groups or a counselor in your area can help you make a safety plan for yourself and your children.  When to call for help  Call 911 anytime you think you may need emergency care. For example, call if:  You think that you or someone you know is in danger of being abused.  You have been hurt and can't have someone safely take you to emergency care.  You have just been abused.  A family member has just been abused.  Where can you learn more?  Go to https://www.Ecociclus.net/patiented  Enter S665 in the search box to learn more about \"Learning About Intimate Partner Violence.\"  Current as of: June 25, 2023               Content Version: 13.8    9889-1295 [employer].   Care instructions adapted under license by your healthcare professional. If you have questions about a medical condition or this instruction, always ask your healthcare professional. [employer] disclaims any warranty or liability for your use of this information.      Vaginal Bleeding During Pregnancy: Care Instructions  Overview     It's common to have some vaginal spotting when you are pregnant. In some cases, the bleeding isn't serious. And there aren't any more problems with the pregnancy.  But sometimes bleeding is a sign of a more serious problem. This is more common if the bleeding is heavy or painful. Examples of more serious problems include miscarriage, an ectopic pregnancy, and a problem with the placenta.  You may have to see your doctor again to be sure everything is okay. You may also need more tests to find the cause of the bleeding.  Home treatment may be all you need. But it depends on what is causing the bleeding. Be sure to tell your doctor if you have any new symptoms or if your symptoms get worse.  The doctor has checked you carefully, but problems can develop later. If you notice any problems or new symptoms, get medical treatment right " away.  Follow-up care is a key part of your treatment and safety. Be sure to make and go to all appointments, and call your doctor if you are having problems. It's also a good idea to know your test results and keep a list of the medicines you take.  How can you care for yourself at home?  If your doctor prescribed medicines, take them exactly as directed. Call your doctor if you think you are having a problem with your medicine.  Do not have vaginal sex until your doctor says it's okay.  Do not put anything in your vagina until your doctor says it's okay.  Ask your doctor about other activities you can or can't do.  Get a lot of rest. Being pregnant can make you tired.  Do not use nonsteroidal anti-inflammatory drugs (NSAIDs), such as ibuprofen (Advil, Motrin), naproxen (Aleve), or aspirin, unless your doctor says it is okay.  When should you call for help?   Call 911 anytime you think you may need emergency care. For example, call if:    You passed out (lost consciousness).     You have severe vaginal bleeding. This means you are soaking through a pad each hour for 2 or more hours.     You have sudden, severe pain in your belly or pelvis.   Call your doctor now or seek immediate medical care if:    You have new or worse vaginal bleeding.     You are dizzy or lightheaded, or you feel like you may faint.     You have pain in your belly, pelvis, or lower back.     You think that you are in labor.     You have a sudden release of fluid from your vagina.     You've been having regular contractions for an hour. This means that you've had at least 8 contractions within 1 hour or at least 4 contractions within 20 minutes, even after you change your position and drink fluids.     You notice that your baby has stopped moving or is moving much less than normal.   Watch closely for changes in your health, and be sure to contact your doctor if you have any problems.  Where can you learn more?  Go to  "https://www.TOPSEC.net/patiented  Enter N829 in the search box to learn more about \"Vaginal Bleeding During Pregnancy: Care Instructions.\"  Current as of: July 11, 2023               Content Version: 13.8    9984-5572 [employer].   Care instructions adapted under license by your healthcare professional. If you have questions about a medical condition or this instruction, always ask your healthcare professional. [employer] disclaims any warranty or liability for your use of this information.      Circumcision in Infants: What to Expect at Home  Your Child's Recovery  After circumcision, your baby's penis may look red and swollen. It may have petroleum jelly and gauze on it. The gauze will likely come off when your baby urinates. Follow your doctor's directions about whether to put clean gauze back on your baby's penis or to leave the gauze off. If you need to remove gauze from the penis, use warm water to soak the gauze and gently loosen it.  The doctor may have used a Plastibell device to do the circumcision. If so, your baby will have a plastic ring around the head of the penis. The ring should fall off by itself in 10 to 12 days.  A thin, yellow film may form over the area the day after the procedure. This is part of the normal healing process. It should go away in a few days.  Your baby may seem fussy while the area heals. It may hurt for your baby to urinate. This pain often gets better in 3 or 4 days. But it may last for up to 2 weeks.  Even though your baby's penis will likely start to feel better after 3 or 4 days, it may look worse. The penis often starts to look like it's getting better after about 7 to 10 days.  This care sheet gives you a general idea about how long it will take for your child to recover. But each child recovers at a different pace. Follow the steps below to help your child get better as quickly as possible.  How can you care for your child at " home?  Activity    Let your baby rest as much as possible. Sleeping will help with recovery.     You can give your baby a sponge bath the day after surgery. Ask your doctor when it is okay to give your baby a bath.   Medicines    Your doctor will tell you if and when your child can restart any medicines. The doctor will also give you instructions about your child taking any new medicines.     Your doctor may recommend giving your baby acetaminophen (Tylenol) to help with pain after the procedure. Be safe with medicines. Give your child medicines exactly as prescribed. Call your doctor if you think your child is having a problem with a medicine.     Do not give your child two or more pain medicines at the same time unless the doctor told you to. Many pain medicines have acetaminophen, which is Tylenol. Too much acetaminophen (Tylenol) can be harmful.   Circumcision care    Always wash your hands before and after touching the circumcision area.     Gently wash your baby's penis with plain, warm water after each diaper change, and pat it dry. Do not use soap. Don't use hydrogen peroxide or alcohol. They can slow healing.     Do not try to remove the film that forms on the penis. The film will go away on its own.     Put plenty of petroleum jelly (such as Vaseline) on the circumcision area during each diaper change. This will prevent your baby's penis from sticking to the diaper while it heals.     Fasten your baby's diapers loosely so that there is less pressure on the penis while it heals.   Follow-up care is a key part of your child's treatment and safety. Be sure to make and go to all appointments, and call your doctor if your child is having problems. It's also a good idea to know your child's test results and keep a list of the medicines your child takes.  When should you call for help?   Call your doctor now or seek immediate medical care if:    Your baby has a fever over 100.4 F.     Your baby is extremely fussy  "or irritable, has a high-pitched cry, or refuses to eat.     Your baby does not have a wet diaper within 12 hours after the circumcision.     You find a spot of bleeding larger than a 2-inch [place] from the incision.     Your baby has signs of infection. Signs may include severe swelling; redness; a red streak on the shaft of the penis; or a thick, yellow discharge.   Watch closely for changes in your child's health, and be sure to contact your doctor if:    A Plastibell device was used for the circumcision and the ring has not fallen off after 10 to 12 days.   Where can you learn more?  Go to https://www.Diversion.net/patiented  Enter S255 in the search box to learn more about \"Circumcision in Infants: What to Expect at Home.\"  Current as of: February 28, 2023               Content Version: 13.8    8263-0274 [employer].   Care instructions adapted under license by your healthcare professional. If you have questions about a medical condition or this instruction, always ask your healthcare professional. [employer] disclaims any warranty or liability for your use of this information.      "

## 2024-02-19 LAB
ABO/RH(D): ABNORMAL
ANTIBODY SCREEN: POSITIVE
SPECIMEN EXPIRATION DATE: ABNORMAL

## 2024-02-20 ENCOUNTER — HOSPITAL ENCOUNTER (OUTPATIENT)
Dept: ULTRASOUND IMAGING | Facility: CLINIC | Age: 24
Discharge: HOME | End: 2024-02-20
Attending: STUDENT IN AN ORGANIZED HEALTH CARE EDUCATION/TRAINING PROGRAM | Admitting: STUDENT IN AN ORGANIZED HEALTH CARE EDUCATION/TRAINING PROGRAM
Payer: MEDICAID

## 2024-02-20 ENCOUNTER — LAB (OUTPATIENT)
Dept: LAB | Facility: CLINIC | Age: 24
End: 2024-02-20
Payer: MEDICAID

## 2024-02-20 DIAGNOSIS — Z34.90 SUPERVISION OF NORMAL PREGNANCY: ICD-10-CM

## 2024-02-20 LAB
ANTIBODY UNIDENTIFIED: NORMAL
ERYTHROCYTE [DISTWIDTH] IN BLOOD BY AUTOMATED COUNT: 12.2 % (ref 10–15)
HCT VFR BLD AUTO: 33.7 % (ref 35–47)
HGB BLD-MCNC: 11.3 G/DL (ref 11.7–15.7)
HOLD SPECIMEN: NORMAL
MCH RBC QN AUTO: 31.6 PG (ref 26.5–33)
MCHC RBC AUTO-ENTMCNC: 33.5 G/DL (ref 31.5–36.5)
MCV RBC AUTO: 94 FL (ref 78–100)
PLATELET # BLD AUTO: 298 10E3/UL (ref 150–450)
RBC # BLD AUTO: 3.58 10E6/UL (ref 3.8–5.2)
SPECIMEN EXPIRATION DATE: NORMAL
WBC # BLD AUTO: 10.4 10E3/UL (ref 4–11)

## 2024-02-20 PROCEDURE — 86762 RUBELLA ANTIBODY: CPT

## 2024-02-20 PROCEDURE — 86780 TREPONEMA PALLIDUM: CPT

## 2024-02-20 PROCEDURE — 86803 HEPATITIS C AB TEST: CPT

## 2024-02-20 PROCEDURE — 85027 COMPLETE CBC AUTOMATED: CPT

## 2024-02-20 PROCEDURE — 87389 HIV-1 AG W/HIV-1&-2 AB AG IA: CPT

## 2024-02-20 PROCEDURE — 87340 HEPATITIS B SURFACE AG IA: CPT

## 2024-02-20 PROCEDURE — 36415 COLL VENOUS BLD VENIPUNCTURE: CPT

## 2024-02-20 PROCEDURE — 86850 RBC ANTIBODY SCREEN: CPT

## 2024-02-20 PROCEDURE — 86900 BLOOD TYPING SEROLOGIC ABO: CPT

## 2024-02-20 PROCEDURE — 76801 OB US < 14 WKS SINGLE FETUS: CPT

## 2024-02-20 PROCEDURE — 87086 URINE CULTURE/COLONY COUNT: CPT

## 2024-02-20 PROCEDURE — 86901 BLOOD TYPING SEROLOGIC RH(D): CPT

## 2024-02-20 PROCEDURE — 86870 RBC ANTIBODY IDENTIFICATION: CPT

## 2024-02-21 ENCOUNTER — MYC MEDICAL ADVICE (OUTPATIENT)
Dept: FAMILY MEDICINE | Facility: CLINIC | Age: 24
End: 2024-02-21
Payer: MEDICAID

## 2024-02-21 LAB
HBV SURFACE AG SERPL QL IA: NONREACTIVE
HCV AB SERPL QL IA: NONREACTIVE
HIV 1+2 AB+HIV1 P24 AG SERPL QL IA: NONREACTIVE
RUBV IGG SERPL QL IA: 2.22 INDEX
RUBV IGG SERPL QL IA: POSITIVE
T PALLIDUM AB SER QL: NONREACTIVE

## 2024-02-22 ENCOUNTER — TELEPHONE (OUTPATIENT)
Dept: FAMILY MEDICINE | Facility: CLINIC | Age: 24
End: 2024-02-22
Payer: MEDICAID

## 2024-02-22 DIAGNOSIS — Z34.90 ENCOUNTER FOR SUPERVISION OF NORMAL PREGNANCY, ANTEPARTUM, UNSPECIFIED GRAVIDITY: Primary | ICD-10-CM

## 2024-02-22 LAB — BACTERIA UR CULT: NO GROWTH

## 2024-02-22 NOTE — TELEPHONE ENCOUNTER
Lab is calling regarding patient's type and screen and there was reactivity on the antibody screen and there wasn't any specific reaction.    They would like to have her redrawn in a couple of months to have her rechecked to see if the reactivity is more clear.    You can talk to ron aldridge from Lab     Perlita Rider RN on 2/22/2024 at 12:01 PM

## 2024-02-23 NOTE — TELEPHONE ENCOUNTER
Repeat ABO T&S ordered.  Literature review suggests positive ab screen in Rh+ individual is benign, but no harm in recheck.

## 2024-03-04 ENCOUNTER — MYC MEDICAL ADVICE (OUTPATIENT)
Dept: FAMILY MEDICINE | Facility: CLINIC | Age: 24
End: 2024-03-04
Payer: MEDICAID

## 2024-03-05 ENCOUNTER — NURSE TRIAGE (OUTPATIENT)
Dept: FAMILY MEDICINE | Facility: CLINIC | Age: 24
End: 2024-03-05
Payer: MEDICAID

## 2024-03-05 NOTE — TELEPHONE ENCOUNTER
"Nurse Triage SBAR    Is this a 2nd Level Triage? YES, LICENSED PRACTITIONER REVIEW IS REQUIRED    Situation: Patient reports she became very light-headed on Friday at work. She had to leave and went home and took a nap and when she woke up she felt better. She reports she has had some light-headedness off and on but overall it has been better. She is eating and drinking well, no vomiting. She also reports she has had some lower abdominal cramping that feels like \"period cramps.\" No bleeding or fevers. No abnormal discharge. She has had some headaches, No shoulder pain. 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     Her appointment is scheduled for 3/8/24 with Dr. López. She is also seeing Kasey Yan.    Background: 2nd pregnancy    Assessment: Patient reports she feels better today. No contractions or bleeding, some intermittent cramping at times. No current light-headedness. No shoulder pain. Says she feels fine at this time.        Protocol Recommended Disposition:   Go To ED/UCC Now (Or To Office With PCP Approval)    Patient establishes with Dr. López on Friday. Will be seen here in [place], please review    Recommendation: Does patient need to be seen today or can she wait until her appointment on Friday?     Routed to provider    Does the patient meet one of the following criteria for ADS visit consideration? No    REMY Brantley, RN      Reason for Disposition   Lightheadedness or dizziness    Additional Information   Negative: SEVERE difficulty breathing (e.g., struggling for each breath, speaks in single words)   Negative: Shock suspected (e.g., cold/pale/clammy skin, too weak to stand, low BP, rapid pulse)   Negative: Difficult to awaken or acting confused (e.g., disoriented, slurred speech)   Negative: Fainted, and still feels dizzy afterwards   Negative: Overdose (accidental or intentional) of medications   Negative: New neurologic deficit that is present now: * Weakness of the " face, arm, or leg on one side of the body * Numbness of the face, arm, or leg on one side of the body * Loss of speech or garbled speech   Negative: Heart beating < 50 beats per minute OR > 140 beats per minute   Negative: Sounds like a life-threatening emergency to the triager   Negative: Chest pain   Negative: Rectal bleeding, bloody stool, or tarry-black stool   Negative: Vomiting is main symptom   Negative: Diarrhea is main symptom   Negative: Headache is main symptom   Negative: Heat exhaustion suspected (i.e., dehydration from heat exposure)   Negative: Patient states that they are having an anxiety or panic attack   Negative: Dizziness from low blood sugar (i.e., < 60 mg/dl or 3.5 mmol/l)   Negative: SEVERE dizziness (e.g., unable to stand, requires support to walk, feels like passing out now)   Negative: SEVERE headache or neck pain   Negative: Spinning or tilting sensation (vertigo) present now and one or more stroke risk factors (i.e., hypertension, diabetes mellitus, prior stroke/TIA, heart attack, age over 60) (Exception: Prior physician evaluation for this AND no different/worse than usual.)   Negative: Neurologic deficit that was brief (now gone), ANY of the following:* Weakness of the face, arm, or leg on one side of the body* Numbness of the face, arm, or leg on one side of the body* Loss of speech or garbled speech   Negative: Loss of vision or double vision  (Exception: Similar to previous migraines.)   Negative: Extra heartbeats, irregular heart beating, or heart is beating very fast (i.e., 'palpitations')   Negative: Difficulty breathing   Negative: Drinking very little and dehydration suspected (e.g., no urine > 12 hours, very dry mouth, very lightheaded)   Negative: Follows bleeding (e.g., stomach, rectum, vagina)  (Exception: Became dizzy from sight of small amount blood.)   Negative: Patient sounds very sick or weak to the triager   Negative: Lightheadedness (dizziness) present now, after 2  hours of rest and fluids   Negative: Spinning or tilting sensation (vertigo) present now   Negative: Fever > 103 F (39.4 C)   Negative: Fever > 100.0 F (37.8 C) and has diabetes mellitus or a weak immune system (e.g., HIV positive, cancer chemotherapy, organ transplant, splenectomy, chronic steroids)   Negative: MODERATE dizziness (e.g., interferes with normal activities)  (Exception: Dizziness caused by heat exposure, sudden standing, or poor fluid intake.)   Negative: Vomiting occurs with dizziness   Negative: Patient wants to be seen   Negative: Taking a medicine that could cause dizziness (e.g., blood pressure medications, diuretics)   Negative: Passed out (i.e., fainted, collapsed and was not responding)   Negative: Shock suspected (e.g., cold/pale/clammy skin, too weak to stand, low BP, rapid pulse)   Negative: Difficult to awaken or acting confused (e.g., disoriented, slurred speech)   Negative: Sounds like a life-threatening emergency to the triager   Negative: Abdominal pain and pregnant 20 or more weeks   Negative: MODERATE-SEVERE abdominal pain   Negative: SEVERE vaginal bleeding (e.g., soaking 2 pads or tampons per hour and present 2 or more hours; 1 menstrual cup every 2 hours)   Negative: MODERATE vaginal bleeding (e.g., soaking 1 pad or tampon per hour and present > 6 hours; 1 menstrual cup every 6 hours)   Negative: MODERATE vaginal bleeding and pregnant > 12 weeks   Negative: Passed tissue (e.g., gray-white)   Negative: Vomiting and contains red blood or black ('coffee ground') material   Negative: Shoulder pain   Negative: MILD constant abdominal pain (e.g., doesn't interfere with normal activities) and present > 2 hours   Negative: Intermittent lower abdominal pain lasting > 24 hours   Negative: Vaginal bleeding or spotting   Negative: White of the eyes have turned yellow (i.e., jaundice)    Protocols used: Dizziness-A-OH, Pregnancy - Abdominal Pain Less Than 20 Weeks EGA-A-OH

## 2024-03-05 NOTE — TELEPHONE ENCOUNTER
RN Triage    Patient Contact    Attempt # 1    Was call answered?  No.  Left message on voicemail with information to call me back. [employer] message sent as well.     Lakeshia PHILLIP [facility] (supporting Tena López DO)Yesterday (10:45 AM)     VB  Hello!  I ve been feeling super lightheaded I don t know why ? Or if that s normal it s nothing like the morning sickness that I ve felt so I was a bit concerned! Also I was going to ask if everything if okay would I be able to get a blood test to know baby s gender ?    Sabrina Romero, RN on 3/5/2024 at 3:11 PM

## 2024-03-05 NOTE — TELEPHONE ENCOUNTER
Left message at home number to call back, [employer] message sent also. Telephone encounter created, closing MyChart encounter as there is now a telephone encounter.    Sabrina Romero RN on 3/5/2024 at 3:09 PM

## 2024-03-08 ENCOUNTER — PRENATAL OFFICE VISIT (OUTPATIENT)
Dept: FAMILY MEDICINE | Facility: CLINIC | Age: 24
End: 2024-03-08
Payer: MEDICAID

## 2024-03-08 VITALS
OXYGEN SATURATION: 100 % | RESPIRATION RATE: 18 BRPM | SYSTOLIC BLOOD PRESSURE: 114 MMHG | BODY MASS INDEX: 30.56 KG/M2 | HEART RATE: 78 BPM | TEMPERATURE: 97.2 F | DIASTOLIC BLOOD PRESSURE: 70 MMHG | WEIGHT: 172.5 LBS

## 2024-03-08 DIAGNOSIS — Z34.81 NORMAL PREGNANCY IN MULTIGRAVIDA IN FIRST TRIMESTER: Primary | ICD-10-CM

## 2024-03-08 DIAGNOSIS — Z86.19 HISTORY OF TRICHOMONIASIS: ICD-10-CM

## 2024-03-08 DIAGNOSIS — Z86.19 HISTORY OF CHLAMYDIA INFECTION: ICD-10-CM

## 2024-03-08 DIAGNOSIS — Z3A.10 10 WEEKS GESTATION OF PREGNANCY: ICD-10-CM

## 2024-03-08 PROCEDURE — 99203 OFFICE O/P NEW LOW 30 MIN: CPT | Performed by: STUDENT IN AN ORGANIZED HEALTH CARE EDUCATION/TRAINING PROGRAM

## 2024-03-08 PROCEDURE — 36415 COLL VENOUS BLD VENIPUNCTURE: CPT | Performed by: STUDENT IN AN ORGANIZED HEALTH CARE EDUCATION/TRAINING PROGRAM

## 2024-03-08 NOTE — PROGRESS NOTES
SUBJECTIVE:     HPI:    This is a 23 year old female patient,  who presents for her first obstetrical visit. She has completed appointment with the OB educator.  Her history is reviewed. Please see OB flowsheets.     ZULY: 2024, by Last Menstrual Period.  She is 10w4d weeks.  Her cycles are just took out bc in November was regular.  Her last menstrual period was normal.   Since her LMP, she has had no complaints).   She denies nausea and vaginal bleeding.    Additional History:     Have you travelled during the pregnancy?No  Have your sexual partner(s) travelled during the pregnancy?No      HISTORY:   Planned Pregnancy: Yes  Marital Status: Single  Occupation: caregiver  Living in Household: Spouse and Children    Past History:  Her past medical history   Past Medical History:   Diagnosis Date    History of anxiety     as a teenager    History of depression     as a teenager                 Patient Active Problem List   Diagnosis    Chlamydia trachomatis infection in pregnancy    Fetal size inconsistent with dates    Threatened  in first trimester    Urogenital infection by trichomonas vaginalis    Uses intrauterine device for birth control       She has a history of   threatened , no other problems,  at term x 1.    Since her last LMP she denies use of alcohol, tobacco and street drugs.    Past medical, surgical, social and family history were reviewed and updated in EPIC.    US OB < 14 weeks, single, for dating ([id number]) [[id number]] Collected: 24 1659   Order Status: Completed Updated: 24 1713   Narrative:     EXAM: US OB < 14 WEEKS SINGLE-TRANSABDOMINAL  LOCATION: [facility]  DATE: 2024    INDICATION:  Supervision of normal pregnancy  COMPARISON: None.  TECHNIQUE: Transabdominal scans were performed.    FINDINGS:  UTERUS: Single normal appearing intrauterine gestation sac.  CRL: Measures 1.9 cm, equals 8 weeks 4 days.  FETAL  HEART RATE: 169 bpm.  AMNIOTIC FLUID: Normal.  PLACENTA: Not yet formed. No evidence for sub-chorionic hemorrhage.    RIGHT OVARY: Not visualized due to overlying bowel gas.  LEFT OVARY: 3.8 cm corpus luteal cyst.   Impression:     IMPRESSION:  1.  Single living intrauterine gestation at 8 weeks 4 days, EDC 09/27/2024.         Current Outpatient Medications   Medication    Prenatal Vit-Fe Fumarate-FA (PRENATAL MULTIVITAMIN  PLUS IRON) 27-1 MG TABS     No current facility-administered medications for this visit.       ROS:   Negative unless otherwise specified per HPI.    OBJECTIVE:     EXAM:  /70   Pulse 78   Temp 97.2  F (36.2  C) (Temporal)   Resp 18   Wt 78.2 kg (172 lb 8 oz)   LMP 12/25/2023   SpO2 100%   BMI 30.56 kg/m   Body mass index is 30.56 kg/m .    GENERAL: alert and no acute distress  EYES: Eyes grossly normal to inspection, PERRL and conjunctivae and sclerae normal  HENT: nose and mouth without ulcers or lesions  RESP: normal rate and effort  CV: regular rate, no peripheral edema  ABDOMEN: soft, nontender, non-distended  MS: no gross musculoskeletal defects noted, no edema  SKIN: no suspicious lesions or rashes  NEURO: Normal strength and tone, mentation intact and speech normal  PSYCH: mentation appears normal, affect normal/bright  PELVIC: deferred, patient declined as is utd on pap so collection of this is not indicated.      ASSESSMENT/PLAN:       ICD-10-CM    1. Normal pregnancy in multigravida in first trimester  Z34.81 Wet prep     US OB >14  weeks, Complete Single-Comprehensive ([id number])     Prenatal Vit-Fe Fumarate-FA (PRENATAL MULTIVITAMIN  PLUS IRON) 27-1 MG TABS     Myriad Non-Invasive Prenatal Screening - [place]     Myriad Non-Invasive Prenatal Screening - [place]     Neisseria gonorrhoeae PCR     Chlamydia trachomatis PCR     CANCELED: Chlamydia trachomatis PCR     CANCELED: Neisseria gonorrhoeae PCR      2. 10 weeks gestation of pregnancy  Z3A.10       3. History of  chlamydia infection  Z86.19 Neisseria gonorrhoeae PCR     Chlamydia trachomatis PCR      4. History of trichomoniasis  Z86.19 Wet prep             Initial OB Visit                        PLAN:       First trimester U/S reviewed.       Ordering Anatomy U/S for 17-22 wga.       Continue on Prenatal vitamins.       Discussed NIPS genetic screen: patient accepts, ordered and collected today. Would like gender kept secret and in an envelope on return visit to permit reveal.       Discussed appropriate diet and weight gain throughout pregnancy, 15-25 lbs.       Discussed expected OB course.         Ordered GCCT for recent hx of Chlamydia prior to pregnancy and Wet prep for recent history of Trichomonas prior to pregnancy.        Return in 4 weeks or sooner if any problems.    Tena López, DO

## 2024-03-13 ENCOUNTER — MYC MEDICAL ADVICE (OUTPATIENT)
Dept: FAMILY MEDICINE | Facility: CLINIC | Age: 24
End: 2024-03-13
Payer: MEDICAID

## 2024-03-19 LAB — SCANNED LAB RESULT: NORMAL

## 2024-03-20 ENCOUNTER — MYC MEDICAL ADVICE (OUTPATIENT)
Dept: FAMILY MEDICINE | Facility: CLINIC | Age: 24
End: 2024-03-20
Payer: MEDICAID

## 2024-03-20 ENCOUNTER — NURSE TRIAGE (OUTPATIENT)
Dept: FAMILY MEDICINE | Facility: CLINIC | Age: 24
End: 2024-03-20
Payer: MEDICAID

## 2024-03-20 ENCOUNTER — HOSPITAL ENCOUNTER (EMERGENCY)
Facility: CLINIC | Age: 24
Discharge: HOME | End: 2024-03-20
Attending: FAMILY MEDICINE | Admitting: FAMILY MEDICINE
Payer: MEDICAID

## 2024-03-20 VITALS
RESPIRATION RATE: 16 BRPM | SYSTOLIC BLOOD PRESSURE: 97 MMHG | OXYGEN SATURATION: 98 % | HEART RATE: 82 BPM | TEMPERATURE: 98.4 F | WEIGHT: 171.7 LBS | DIASTOLIC BLOOD PRESSURE: 48 MMHG | BODY MASS INDEX: 30.42 KG/M2

## 2024-03-20 DIAGNOSIS — G44.209 TENSION HEADACHE: ICD-10-CM

## 2024-03-20 LAB
ALBUMIN SERPL BCG-MCNC: 4.5 G/DL (ref 3.5–5.2)
ALP SERPL-CCNC: 59 U/L (ref 40–150)
ALT SERPL W P-5'-P-CCNC: 31 U/L (ref 0–50)
ANION GAP SERPL CALCULATED.3IONS-SCNC: 11 MMOL/L (ref 7–15)
AST SERPL W P-5'-P-CCNC: 30 U/L (ref 0–45)
BASOPHILS # BLD AUTO: 0.1 10E3/UL (ref 0–0.2)
BASOPHILS NFR BLD AUTO: 1 %
BILIRUB DIRECT SERPL-MCNC: <0.2 MG/DL (ref 0–0.3)
BILIRUB SERPL-MCNC: <0.2 MG/DL
BUN SERPL-MCNC: 6.7 MG/DL (ref 6–20)
CALCIUM SERPL-MCNC: 9.6 MG/DL (ref 8.6–10)
CHLORIDE SERPL-SCNC: 101 MMOL/L (ref 98–107)
CREAT SERPL-MCNC: 0.42 MG/DL (ref 0.51–0.95)
DEPRECATED HCO3 PLAS-SCNC: 22 MMOL/L (ref 22–29)
EGFRCR SERPLBLD CKD-EPI 2021: >90 ML/MIN/1.73M2
EOSINOPHIL # BLD AUTO: 0.1 10E3/UL (ref 0–0.7)
EOSINOPHIL NFR BLD AUTO: 1 %
ERYTHROCYTE [DISTWIDTH] IN BLOOD BY AUTOMATED COUNT: 12.7 % (ref 10–15)
GLUCOSE SERPL-MCNC: 81 MG/DL (ref 70–99)
HCT VFR BLD AUTO: 33.8 % (ref 35–47)
HGB BLD-MCNC: 11.4 G/DL (ref 11.7–15.7)
IMM GRANULOCYTES # BLD: 0.2 10E3/UL
IMM GRANULOCYTES NFR BLD: 2 %
LYMPHOCYTES # BLD AUTO: 3 10E3/UL (ref 0.8–5.3)
LYMPHOCYTES NFR BLD AUTO: 30 %
MCH RBC QN AUTO: 31.5 PG (ref 26.5–33)
MCHC RBC AUTO-ENTMCNC: 33.7 G/DL (ref 31.5–36.5)
MCV RBC AUTO: 93 FL (ref 78–100)
MONOCYTES # BLD AUTO: 0.7 10E3/UL (ref 0–1.3)
MONOCYTES NFR BLD AUTO: 7 %
NEUTROPHILS # BLD AUTO: 6 10E3/UL (ref 1.6–8.3)
NEUTROPHILS NFR BLD AUTO: 60 %
NRBC # BLD AUTO: 0 10E3/UL
NRBC BLD AUTO-RTO: 0 /100
PLATELET # BLD AUTO: 315 10E3/UL (ref 150–450)
POTASSIUM SERPL-SCNC: 3.9 MMOL/L (ref 3.4–5.3)
PROT SERPL-MCNC: 7.5 G/DL (ref 6.4–8.3)
RBC # BLD AUTO: 3.62 10E6/UL (ref 3.8–5.2)
SODIUM SERPL-SCNC: 134 MMOL/L (ref 135–145)
WBC # BLD AUTO: 10 10E3/UL (ref 4–11)

## 2024-03-20 PROCEDURE — 250N000013 HC RX MED GY IP 250 OP 250 PS 637: Performed by: FAMILY MEDICINE

## 2024-03-20 PROCEDURE — 82248 BILIRUBIN DIRECT: CPT | Performed by: FAMILY MEDICINE

## 2024-03-20 PROCEDURE — 36415 COLL VENOUS BLD VENIPUNCTURE: CPT | Performed by: FAMILY MEDICINE

## 2024-03-20 PROCEDURE — 99284 EMERGENCY DEPT VISIT MOD MDM: CPT | Performed by: FAMILY MEDICINE

## 2024-03-20 PROCEDURE — 85025 COMPLETE CBC W/AUTO DIFF WBC: CPT | Performed by: FAMILY MEDICINE

## 2024-03-20 PROCEDURE — 258N000003 HC RX IP 258 OP 636: Performed by: FAMILY MEDICINE

## 2024-03-20 PROCEDURE — 96360 HYDRATION IV INFUSION INIT: CPT | Performed by: FAMILY MEDICINE

## 2024-03-20 PROCEDURE — 99283 EMERGENCY DEPT VISIT LOW MDM: CPT | Performed by: FAMILY MEDICINE

## 2024-03-20 PROCEDURE — 250N000011 HC RX IP 250 OP 636: Performed by: FAMILY MEDICINE

## 2024-03-20 PROCEDURE — 80053 COMPREHEN METABOLIC PANEL: CPT | Performed by: FAMILY MEDICINE

## 2024-03-20 RX ORDER — ACETAMINOPHEN 500 MG
1000 TABLET ORAL ONCE
Status: COMPLETED | OUTPATIENT
Start: 2024-03-20 | End: 2024-03-20

## 2024-03-20 RX ORDER — MAGNESIUM SULFATE 1 G/100ML
1 INJECTION INTRAVENOUS ONCE
Status: COMPLETED | OUTPATIENT
Start: 2024-03-20 | End: 2024-03-20

## 2024-03-20 RX ADMIN — ACETAMINOPHEN 1000 MG: 500 TABLET ORAL at 14:23

## 2024-03-20 RX ADMIN — MAGNESIUM SULFATE HEPTAHYDRATE 1 G: 1 INJECTION, SOLUTION INTRAVENOUS at 14:23

## 2024-03-20 RX ADMIN — SODIUM CHLORIDE 1000 ML: 9 INJECTION, SOLUTION INTRAVENOUS at 14:23

## 2024-03-20 ASSESSMENT — ACTIVITIES OF DAILY LIVING (ADL)
ADLS_ACUITY_SCORE: 35
ADLS_ACUITY_SCORE: 33

## 2024-03-20 ASSESSMENT — COLUMBIA-SUICIDE SEVERITY RATING SCALE - C-SSRS
1. IN THE PAST MONTH, HAVE YOU WISHED YOU WERE DEAD OR WISHED YOU COULD GO TO SLEEP AND NOT WAKE UP?: NO
2. HAVE YOU ACTUALLY HAD ANY THOUGHTS OF KILLING YOURSELF IN THE PAST MONTH?: NO
6. HAVE YOU EVER DONE ANYTHING, STARTED TO DO ANYTHING, OR PREPARED TO DO ANYTHING TO END YOUR LIFE?: NO

## 2024-03-20 NOTE — ED TRIAGE NOTES
Has headache since yesterday tried Tylenol but it did not alleviate the pain.  Daughter with patient.  Patient is 12 weeks pregnant.  Alert and oriented.     Triage Assessment (Adult)       Row Name 03/20/24 1348          Triage Assessment    Airway WDL WDL        Respiratory WDL    Respiratory WDL WDL        Cognitive/Neuro/Behavioral WDL    Cognitive/Neuro/Behavioral WDL WDL

## 2024-03-20 NOTE — TELEPHONE ENCOUNTER
Patient has had a constant headache since yesterday.    Her current pain is 9 out of 10.  Tylenol did not help at all.  She has simus congestion and she has had some blood when she blows her nose for 2 days.    Per protocol patient advised to go to the ER. Patient agrees with the plan.  Perlita Rider RN on 3/20/2024 at 11:00 AM      Reason for Disposition   SEVERE headache and 'worst headache' of life    Additional Information   Negative: Difficult to awaken or acting confused (e.g., disoriented, slurred speech)   Negative: Weakness of the face, arm or leg on one side of the body and new-onset   Negative: Numbness of the face, arm or leg on one side of the body and new-onset   Negative: Loss of speech or garbled speech and new-onset   Negative: Passed out (i.e., fainted, collapsed and was not responding)   Negative: Sounds like a life-threatening emergency to the triager   Negative: Followed a head injury within last 3 days   Negative: Traumatic Brain Injury (TBI) is suspected   Negative: Sinus pain of forehead and yellow or green nasal discharge   Negative: Unable to walk without falling   Negative: Stiff neck (can't touch chin to chest)   Negative: Possibility of carbon monoxide exposure   Negative: SEVERE headache (e.g., excruciating) and having contractions or other symptoms of labor   Negative: Pregnant 20 or more weeks with Systolic BP >= 140 OR Diastolic BP >= 90   Negative: Pregnant 20 or more weeks and SEVERE headache (e.g., excruciating) that is not improved after 2 hours of pain medicine   Negative: Pregnant 20 or more weeks and new hand or face swelling   Negative: Pregnant 20 or more weeks and new blurred vision or vision changes   Negative: Pregnant 20 or more weeks and sudden weight gain (i.e., more than 3 lbs or 1.4 kg in one week)   Negative: Pregnant 20 or more weeks and upper abdomen pain   Negative: Pregnant 23 or more weeks and baby is moving less today (e.g., kick count < 5 in 1 hour or < 10  in 2 hours)    Protocols used: Pregnancy - Headache-A-OH

## 2024-03-20 NOTE — ED PROVIDER NOTES
History     Chief Complaint   Patient presents with    Headache     HPI  Lakeshia Phillips is a 23 year old female who presents with headache been going on since yesterday.  Patient is 12 weeks pregnant.  Patient does not have a history of headaches.  He is having some light sensitivity with this.  Patient took some Tylenol yesterday but it just has not helped much.  Denies any extremity numbness or tingling.  Denies any loss of vision.  Denies any black spots in her visual field.    Allergies:  Allergies   Allergen Reactions    Tramadol Other (See Comments)     Anxiety/panic attacks       Problem List:    Patient Active Problem List    Diagnosis Date Noted    Urogenital infection by trichomonas vaginalis 2022     Priority: Medium    Uses intrauterine device for birth control 2020     Priority: Medium    Fetal size inconsistent with dates 2018     Priority: Medium    Threatened  in first trimester 10/04/2017     Priority: Medium    Chlamydia trachomatis infection in pregnancy 2017     Priority: Medium        Past Medical History:    Past Medical History:   Diagnosis Date    History of anxiety     History of depression        Past Surgical History:    Past Surgical History:   Procedure Laterality Date    APPENDECTOMY  2002    TONSILLECTOMY      WISDOM TOOTH EXTRACTION         Family History:    Family History   Problem Relation Age of Onset    Miscarriages / Stillbirths Mother     Seizure Disorder Mother     Unknown/Adopted Father     Diabetes Maternal Grandmother     Dementia Maternal Grandmother     Unknown/Adopted Maternal Grandfather     Unknown/Adopted Paternal Grandmother     Unknown/Adopted Paternal Grandfather     No Known Problems Daughter     Unknown/Adopted Half-Sister     Attention Deficit Disorder Half-Brother     No Known Problems Half-Brother     No Known Problems Half-Brother        Social History:  Marital Status:  Single [1]  Social History     Tobacco Use    Smoking  status: Never    Smokeless tobacco: Never   Vaping Use    Vaping Use: Every day    Substances: Nicotine   Substance Use Topics    Alcohol use: Not Currently    Drug use: Yes     Types: Marijuana        Medications:    Prenatal Vit-Fe Fumarate-FA (PRENATAL MULTIVITAMIN  PLUS IRON) 27-1 MG TABS          Review of Systems   All other systems reviewed and are negative.      Physical Exam   BP: 109/70  Pulse: 87  Temp: 98.4  F (36.9  C)  Resp: 16  Weight: 77.9 kg (171 lb 11.2 oz)  SpO2: 100 %      Physical Exam  Vitals and nursing note reviewed.   Constitutional:       General: She is not in acute distress.     Appearance: She is well-developed. She is not diaphoretic.   HENT:      Head: Normocephalic and atraumatic.      Nose: Nose normal.      Mouth/Throat:      Pharynx: No oropharyngeal exudate.   Eyes:      Conjunctiva/sclera: Conjunctivae normal.   Cardiovascular:      Rate and Rhythm: Normal rate and regular rhythm.      Heart sounds: Normal heart sounds. No murmur heard.     No friction rub.   Pulmonary:      Effort: Pulmonary effort is normal. No respiratory distress.      Breath sounds: Normal breath sounds. No stridor. No wheezing or rales.   Abdominal:      General: Bowel sounds are normal. There is no distension.      Palpations: Abdomen is soft. There is no mass.      Tenderness: There is no abdominal tenderness. There is no guarding.   Musculoskeletal:         General: No tenderness. Normal range of motion.      Cervical back: Normal range of motion and neck supple.   Skin:     General: Skin is warm and dry.      Capillary Refill: Capillary refill takes less than 2 seconds.      Findings: No erythema.   Neurological:      Mental Status: She is alert and oriented to person, place, and time.   Psychiatric:         Judgment: Judgment normal.         ED Course        Procedures        Results for orders placed or performed during the hospital encounter of 03/20/24 (from the past 24 hour(s))   CBC with platelets  differential    Narrative    The following orders were created for panel order CBC with platelets differential.  Procedure                               Abnormality         Status                     ---------                               -----------         ------                     CBC with platelets and d...[[id number]]  Abnormal            Final result                 Please view results for these tests on the individual orders.   Basic metabolic panel   Result Value Ref Range    Sodium 134 (L) 135 - 145 mmol/L    Potassium 3.9 3.4 - 5.3 mmol/L    Chloride 101 98 - 107 mmol/L    Carbon Dioxide (CO2) 22 22 - 29 mmol/L    Anion Gap 11 7 - 15 mmol/L    Urea Nitrogen 6.7 6.0 - 20.0 mg/dL    Creatinine 0.42 (L) 0.51 - 0.95 mg/dL    GFR Estimate >90 >60 mL/min/1.73m2    Calcium 9.6 8.6 - 10.0 mg/dL    Glucose 81 70 - 99 mg/dL   Hepatic function panel   Result Value Ref Range    Protein Total 7.5 6.4 - 8.3 g/dL    Albumin 4.5 3.5 - 5.2 g/dL    Bilirubin Total <0.2 <=1.2 mg/dL    Alkaline Phosphatase 59 40 - 150 U/L    AST 30 0 - 45 U/L    ALT 31 0 - 50 U/L    Bilirubin Direct <0.20 0.00 - 0.30 mg/dL   CBC with platelets and differential   Result Value Ref Range    WBC Count 10.0 4.0 - 11.0 10e3/uL    RBC Count 3.62 (L) 3.80 - 5.20 10e6/uL    Hemoglobin 11.4 (L) 11.7 - 15.7 g/dL    Hematocrit 33.8 (L) 35.0 - 47.0 %    MCV 93 78 - 100 fL    MCH 31.5 26.5 - 33.0 pg    MCHC 33.7 31.5 - 36.5 g/dL    RDW 12.7 10.0 - 15.0 %    Platelet Count 315 150 - 450 10e3/uL    % Neutrophils 60 %    % Lymphocytes 30 %    % Monocytes 7 %    % Eosinophils 1 %    % Basophils 1 %    % Immature Granulocytes 2 %    NRBCs per 100 WBC 0 <1 /100    Absolute Neutrophils 6.0 1.6 - 8.3 10e3/uL    Absolute Lymphocytes 3.0 0.8 - 5.3 10e3/uL    Absolute Monocytes 0.7 0.0 - 1.3 10e3/uL    Absolute Eosinophils 0.1 0.0 - 0.7 10e3/uL    Absolute Basophils 0.1 0.0 - 0.2 10e3/uL    Absolute Immature Granulocytes 0.2 <=0.4 10e3/uL    Absolute NRBCs 0.0  10e3/uL       Medications   sodium chloride 0.9% BOLUS 1,000 mL (has no administration in time range)   magnesium sulfate 1 g in 100 mL D5W intermittent infusion (has no administration in time range)   acetaminophen (TYLENOL) tablet 1,000 mg (has no administration in time range)     Labs of come back and are mostly unremarkable.  Patient's sodium level was a little bit on the low side.  Patient is feeling significantly better after the above medications.  Not sure if this headache is more of a tension headache or possible migraine.  Patient has no red flag symptoms to indicate anything more worrisome.  Blood pressure is good.  Will go ahead and discharge the patient home at this time.  Recommend continue conservative care including Tylenol as needed for pain.  Patient should push fluids.  Recommend follow-up if there is no improvement over the next few days    Assessments & Plan (with Medical Decision Making)  Tension headache     I have reviewed the nursing notes.    I have reviewed the findings, diagnosis, plan and need for follow up with the patient.            3/20/2024   [facility] EMERGENCY DEPT       King Garcia MD  03/20/24 5814     Universal Safety Interventions

## 2024-04-02 ENCOUNTER — NURSE TRIAGE (OUTPATIENT)
Dept: FAMILY MEDICINE | Facility: CLINIC | Age: 24
End: 2024-04-02
Payer: MEDICAID

## 2024-04-02 NOTE — TELEPHONE ENCOUNTER
Tena López, DO  [facility] Pool9 hours ago (9:55 PM)     RO  Home dopplers are not dangerous. Sometimes they add to anxiety if the heartbeat is hard to find, which it sometimes can be. Just something to consider.

## 2024-04-02 NOTE — TELEPHONE ENCOUNTER
Patient states she has had a headache since yesterday.    Her current headache is 5 out of 10.  She states the tylenol is helping the headache.    See other telephone message regarding provider advice.    Patient will call back if the tylenol does not help her headache resolve.    Perlita Rider RN on 4/2/2024 at 1:05 PM      Reason for Disposition   MODERATE headache (e.g., interferes with normal activities), present more than 24 hours, and unexplained  (Exceptions: Has not tried pain medicines, typical migraine, or headache part of viral illness.)    Additional Information   Negative: Difficult to awaken or acting confused (e.g., disoriented, slurred speech)   Negative: Weakness of the face, arm or leg on one side of the body and new-onset   Negative: Numbness of the face, arm or leg on one side of the body and new-onset   Negative: Loss of speech or garbled speech and new-onset   Negative: Passed out (i.e., fainted, collapsed and was not responding)   Negative: Sounds like a life-threatening emergency to the triager   Negative: Followed a head injury within last 3 days   Negative: Traumatic Brain Injury (TBI) is suspected   Negative: Sinus pain of forehead and yellow or green nasal discharge   Negative: Unable to walk without falling   Negative: Stiff neck (can't touch chin to chest)   Negative: Possibility of carbon monoxide exposure   Negative: SEVERE headache (e.g., excruciating) and having contractions or other symptoms of labor   Negative: Pregnant 20 or more weeks with Systolic BP >= 140 OR Diastolic BP >= 90   Negative: Pregnant 20 or more weeks and SEVERE headache (e.g., excruciating) that is not improved after 2 hours of pain medicine   Negative: Pregnant 20 or more weeks and new hand or face swelling   Negative: Pregnant 20 or more weeks and new blurred vision or vision changes   Negative: Pregnant 20 or more weeks and sudden weight gain (i.e., more than 3 lbs or 1.4 kg in one week)   Negative:  Pregnant 20 or more weeks and upper abdomen pain   Negative: Pregnant 23 or more weeks and baby is moving less today (e.g., kick count < 5 in 1 hour or < 10 in 2 hours)   Negative: SEVERE headache and 'worst headache' of life   Negative: SEVERE headache, sudden-onset (i.e., reaching maximum intensity within seconds to 1 hour)   Negative: Severe pain in one eye   Negative: Loss of vision or double vision  (Exception: Similar to previous migraines.)   Negative: Patient sounds very sick or weak to the triager   Negative: Fever > 103 F (39.4 C)   Negative: Pregnant less than 20 weeks and SEVERE headache (e.g., excruciating) and not improved after 2 hours of pain medicine   Negative: SEVERE headache and fever   Negative: Vomiting 2 or more times  (Exception: Similar to previous migraines.)    Protocols used: Pregnancy - Headache-A-OH

## 2024-04-03 ENCOUNTER — NURSE TRIAGE (OUTPATIENT)
Dept: FAMILY MEDICINE | Facility: CLINIC | Age: 24
End: 2024-04-03
Payer: MEDICAID

## 2024-04-03 ENCOUNTER — HOSPITAL ENCOUNTER (EMERGENCY)
Facility: CLINIC | Age: 24
Discharge: HOME | End: 2024-04-03
Attending: EMERGENCY MEDICINE | Admitting: EMERGENCY MEDICINE
Payer: MEDICAID

## 2024-04-03 VITALS
TEMPERATURE: 98.1 F | RESPIRATION RATE: 20 BRPM | DIASTOLIC BLOOD PRESSURE: 71 MMHG | HEART RATE: 91 BPM | WEIGHT: 175.9 LBS | HEIGHT: 63 IN | OXYGEN SATURATION: 100 % | BODY MASS INDEX: 31.17 KG/M2 | SYSTOLIC BLOOD PRESSURE: 112 MMHG

## 2024-04-03 DIAGNOSIS — R51.9 ACUTE NONINTRACTABLE HEADACHE, UNSPECIFIED HEADACHE TYPE: ICD-10-CM

## 2024-04-03 PROCEDURE — 99283 EMERGENCY DEPT VISIT LOW MDM: CPT | Performed by: EMERGENCY MEDICINE

## 2024-04-03 PROCEDURE — 99282 EMERGENCY DEPT VISIT SF MDM: CPT | Performed by: EMERGENCY MEDICINE

## 2024-04-03 ASSESSMENT — COLUMBIA-SUICIDE SEVERITY RATING SCALE - C-SSRS
6. HAVE YOU EVER DONE ANYTHING, STARTED TO DO ANYTHING, OR PREPARED TO DO ANYTHING TO END YOUR LIFE?: NO
1. IN THE PAST MONTH, HAVE YOU WISHED YOU WERE DEAD OR WISHED YOU COULD GO TO SLEEP AND NOT WAKE UP?: NO
2. HAVE YOU ACTUALLY HAD ANY THOUGHTS OF KILLING YOURSELF IN THE PAST MONTH?: NO

## 2024-04-03 ASSESSMENT — ACTIVITIES OF DAILY LIVING (ADL): ADLS_ACUITY_SCORE: 33

## 2024-04-03 NOTE — ED TRIAGE NOTES
Pt reports a headache that started midday on Monday. Pt states she has been taking tylenol without improvement. Pt states she is 14 weeks pregnant.      Triage Assessment (Adult)       Row Name 04/03/24 1351          Triage Assessment    Airway WDL WDL        Respiratory WDL    Respiratory WDL WDL        Skin Circulation/Temperature WDL    Skin Circulation/Temperature WDL WDL        Cardiac WDL    Cardiac WDL WDL

## 2024-04-03 NOTE — DISCHARGE INSTRUCTIONS
Patient at this point I think your headaches are due to normal pregnancy related side effects.    Take Tylenol 1000 mg every 8 hours as needed for headache. Massage of the neck and shoulder area should be helpful as well.    If you develop speech changes, vision changes, numbness/tingling/weakness in your arms or legs, fevers, or other symptoms you find concerning return here.

## 2024-04-03 NOTE — TELEPHONE ENCOUNTER
"Patient messaged in with concerns about a headache over the last 24+ hours.  Patient was called and triage encounter created.    Patient is 14 weeks gestation.  Has noted increasing headaches off and on since start of pregnancy.  Has previously been seen in ED for these headaches.  Yesterday woke up with a headache.  Tylenol not helping at all with it.  Denies any nausea or vomiting.  No changes in vision or sensitivity to light.  States the headache is a 10 on a 0-10 pain scale.  Her \"whole head\" hurts.     Per triage guidelines patient will present to  or ED.  Unable to get there until after 1300 today.    Reason for Disposition   SEVERE headache, states 'worst headache' of life    Additional Information   Negative: Difficult to awaken or acting confused (e.g., disoriented, slurred speech)   Negative: Weakness of the face, arm or leg on one side of the body and new-onset   Negative: Numbness of the face, arm or leg on one side of the body and new-onset   Negative: Loss of speech or garbled speech and new-onset   Negative: Passed out (i.e., fainted, collapsed and was not responding)   Negative: Sounds like a life-threatening emergency to the triager   Negative: Followed a head injury within last 3 days   Negative: Traumatic Brain Injury (TBI) is suspected   Negative: Sinus pain of forehead and yellow or green nasal discharge   Negative: Pregnant   Negative: Unable to walk without falling   Negative: Stiff neck (can't touch chin to chest)   Negative: Possibility of carbon monoxide exposure   Negative: SEVERE headache, sudden-onset (i.e., reaching maximum intensity within seconds to 1 hour)    Protocols used: Headache-A-OH    "

## 2024-04-03 NOTE — ED PROVIDER NOTES
History     chief complaint  HPI  Patient is a 23-year-old G2, P1 at 14 weeks gestation presenting with chief complaint of a headache.  Headache started several days ago.  It was gradual in onset.  Is bitemporal and throbbing.  No photophobia or phonophobia.  No nuchal rigidity, fevers, thunderclap onset, vision changes, numbness/tingling/weakness in her arms or legs.  She was referred here by her OB doctor.  She has taken Tylenol 500 mg and it improved.  Did not take it today.    Review of Systems:  All organ systems below were reviewed and are negative unless indicated in the HPI.    Constitutional  Eyes  ENT  Respiratory  Cardiovascular  Gastrointestinal  Genitourinary  Musculoskeletal  Skin  Neuro    Allergies:  Allergies   Allergen Reactions    Tramadol Other (See Comments)     Anxiety/panic attacks       Problem List:    Patient Active Problem List    Diagnosis Date Noted    Urogenital infection by trichomonas vaginalis 2022     Priority: Medium    Uses intrauterine device for birth control 2020     Priority: Medium    Fetal size inconsistent with dates 2018     Priority: Medium    Threatened  in first trimester 10/04/2017     Priority: Medium    Chlamydia trachomatis infection in pregnancy 2017     Priority: Medium        Past Medical History:    Past Medical History:   Diagnosis Date    History of anxiety     History of depression        Past Surgical History:    Past Surgical History:   Procedure Laterality Date    APPENDECTOMY  2002    TONSILLECTOMY      WISDOM TOOTH EXTRACTION         Family History:    Family History   Problem Relation Age of Onset    Miscarriages / Stillbirths Mother     Seizure Disorder Mother     Unknown/Adopted Father     Diabetes Maternal Grandmother     Dementia Maternal Grandmother     Unknown/Adopted Maternal Grandfather     Unknown/Adopted Paternal Grandmother     Unknown/Adopted Paternal Grandfather     No Known Problems Daughter      "Unknown/Adopted Half-Sister     Attention Deficit Disorder Half-Brother     No Known Problems Half-Brother     No Known Problems Half-Brother        Medications:    Prenatal Vit-Fe Fumarate-FA (PRENATAL MULTIVITAMIN  PLUS IRON) 27-1 MG TABS          Physical Exam   BP: 112/71  Pulse: 91  Temp: 98.1  F (36.7  C)  Resp: 20  Height: 160 cm (5' 3\")  Weight: 79.8 kg (175 lb 14.4 oz)  SpO2: 100 %    Gen: Vital signs reviewed  Eyes: Sclera white, pupils round  ENT: External ears and nares normal  Card: Regular rate and rhythm  Resp: No respiratory distress.    Extremities: Symmetric distal pulses.  Skin: No rashes  Neuro: Alert, speech normal.  Normal gait.  Normal tandem gait.  No nuchal rigidity.Face is symmetric.  Strength and sensation intact throughout.  No dysmetria.  Visual fields grossly intact.  Hearing grossly intact.       ED Course        Procedures                No results found for this or any previous visit (from the past 24 hour(s)).    Medications - No data to display      Consultations:  None    Social Determinants of Health:  Manages ADLs    Independent Review of Notes:  Reviewed nursing notes from today.    Assessments & Plan (with Medical Decision Making)       I have reviewed the nursing notes.    I have reviewed the findings, diagnosis, plan and need for follow up with the patient.      Medical Decision Making  On arrival, vital signs are normal.  She has an excellent neurologic exam.  Reviewed her nursing note where she said she had 10 out of 10 pain.  She does not endorse this now and does not endorse that ever during this headache she has experienced the worst headache of her life as the triage note indicates.  She endorses a gradual onset headache over the last 3 days.  No thunderclap onset.  No red flag signs or symptoms to be concern for subarachnoid hemorrhage, meningitis.  Though she is pregnant, at this point do not feel that workup for cerebral venous sinus thrombosis is indicated.  I " offered IV medications for headache.  Patient declined and would rather take Tylenol at home.  Discussed reasons that I would initiate a further workup here and she was discharged home in stable condition.    Final diagnoses:   Acute nonintractable headache, unspecified headache type         Ja Ling M.D.   [facility] Emergency Department     Ja Ling MD  04/03/24 4341

## 2024-04-05 ENCOUNTER — ALLIED HEALTH/NURSE VISIT (OUTPATIENT)
Dept: FAMILY MEDICINE | Facility: CLINIC | Age: 24
End: 2024-04-05
Payer: MEDICAID

## 2024-04-05 DIAGNOSIS — Z11.1 VISIT FOR MANTOUX TEST: Primary | ICD-10-CM

## 2024-04-05 PROCEDURE — 99207 PR NO CHARGE NURSE ONLY: CPT

## 2024-04-05 PROCEDURE — 86580 TB INTRADERMAL TEST: CPT

## 2024-04-05 NOTE — PROGRESS NOTES
"Patient is here today for a Mantoux (TST) test placement.    Is there a current order in the chart? Yes    Reason for Mantoux (TST) in patient's own words: \"My job is asking me to get tested for TB\".    Patient needs form signed? No - form not needed per patient.    Instructed patient to wait for 15 minutes post injection and to report any reactions immediately to staff.    Told patient to return to clinic in 48-72 hours to have Mantoux (TST) read.          "

## 2024-04-08 ENCOUNTER — ALLIED HEALTH/NURSE VISIT (OUTPATIENT)
Dept: FAMILY MEDICINE | Facility: CLINIC | Age: 24
End: 2024-04-08

## 2024-04-08 ENCOUNTER — MYC MEDICAL ADVICE (OUTPATIENT)
Dept: FAMILY MEDICINE | Facility: CLINIC | Age: 24
End: 2024-04-08

## 2024-04-08 DIAGNOSIS — Z11.1 SCREENING EXAMINATION FOR PULMONARY TUBERCULOSIS: Primary | ICD-10-CM

## 2024-04-08 LAB
PPDINDURATION: 0 MM (ref 0–4.99)
PPDREDNESS: NORMAL

## 2024-04-08 PROCEDURE — 99207 PR NO CHARGE NURSE ONLY: CPT

## 2024-04-08 NOTE — PROGRESS NOTES
Lakeshia Phillips is here for Mantoux reading.   It was placed on her/his left forearm on 4/5/24 day at 2:35pm time.    They are in the 48-72 hour window.    Mantoux was read by Perlita Rider RN on 4/8/2024 at 1:45 PM      Form filled out as needed    If two step mantoux is needed, patient reminded to schedule float visit in at least one week.    No further questions at this time.    Perlita Rider RN on 4/8/2024 at 1:45 PM

## 2024-04-09 ENCOUNTER — MYC MEDICAL ADVICE (OUTPATIENT)
Dept: FAMILY MEDICINE | Facility: CLINIC | Age: 24
End: 2024-04-09
Payer: MEDICAID

## 2024-04-09 ENCOUNTER — NURSE TRIAGE (OUTPATIENT)
Dept: FAMILY MEDICINE | Facility: CLINIC | Age: 24
End: 2024-04-09
Payer: MEDICAID

## 2024-04-09 NOTE — TELEPHONE ENCOUNTER
Nurse Triage SBAR    Is this a 2nd Level Triage? NO    Situation: white/clear sticky, jelly like vaginal discharge    Background: 24 yo patient that is 15 weeks pregnant. Patient states the discharge started on 2024. Patient states it started as white gooey then turned clear sticky and gooey/jelly like today. Patient does comment about having back pain on the left side.     Assessment: Patient denies abdominal pain, pain with urination, vaginal bleeding, itching, fever.     Protocol Recommended Disposition:   If it becomes red, or patient experiences cramping to go to ER. Advised patient to seek emergency care if symptoms worsen per RN protocol.     Recommendation: Huddled with Dr. Shirley german for pregnant patient to have mild  discharge, watch for changes. If it becomes red, or patient experiences cramping then go to ER.     Rosalia Griggs RN on 2024 at 11:26 AM      Reason for Disposition   Home treatment > 3 days for 'yeast infection' and not improved    Additional Information   Negative: Pregnant 20 or more weeks with vaginal bleeding   Negative: Pregnant < 20 weeks with vaginal bleeding   Negative: Pregnant < 37 weeks (i.e., ) and having contractions or other symptoms of labor   Negative: Pregnant 37 or more weeks (i.e., term) and having contractions or other symptoms of labor   Negative: Leakage of fluid (trickle, gush) from the vagina   Negative: Pregnant 23 or more weeks and baby is moving less today (e.g., kick count < 5 in 1 hour or < 10 in 2 hours)   Negative: Pregnant 24-36 weeks () and pinkish or brownish mucous discharge   Negative: Constant abdominal pain and present > 2 hours   Negative: Intermittent lower abdominal pain lasting > 24 hours   Negative: Patient sounds very sick or weak to the triager   Negative: Yellow or green vaginal discharge and fever   Negative: Genital area looks infected (e.g.,  draining sore, spreading redness)   Negative: Painful rash with  "tiny water blisters   Negative: Rash (e.g., redness, tiny bumps, sore) of genital area   Negative: Patient wants to be seen   Negative: Abnormal color vaginal discharge (i.e., yellow, green, gray)   Negative: Bad smelling vaginal discharge   Negative: Tender lump (swelling or 'ball') at vaginal opening    Answer Assessment - Initial Assessment Questions  1. DISCHARGE: \"Describe the discharge.\" (e.g., white, yellow, green, gray, foamy, cottage cheese-like)     White to clear  2. ODOR: \"Is there a bad odor?\"      No odor  3. ONSET: \"When did the discharge begin?\"      04-  4. RASH: \"Is there a rash in that area?\" If Yes, ask: \"Describe it.\" (e.g., redness, blisters, sores, bumps)      No  5. ABDOMEN PAIN: \"Are you having any abdomen pain?\" If Yes, ask: \"What does it feel like?\" (e.g., crampy, dull, intermittent, constant)       No cramping  6. ABDOMEN PAIN SEVERITY: If present, ask: \"How bad is it?\"  (e.g., Scale 1-10; mild, moderate, or severe)  No pain  7. CAUSE: \"What do you think is causing the discharge?\"      Unknown  8. OTHER SYMPTOMS: \"Do you have any other symptoms?\" (e.g., fever, itching, vaginal bleeding, pain with urination)      Denies pain with urination, vaginal bleeding,no itching  9. ZULY: \"What date are you expecting to deliver?\"       09-  10. PREGNANCY: \"How many weeks pregnant are you?\"        15 weeks    Protocols used: Pregnancy - Vaginal [id number]-X-FH    "

## 2024-04-09 NOTE — TELEPHONE ENCOUNTER
"Additional Information    Negative: Pregnant 20 or more weeks with vaginal bleeding    Negative: Pregnant < 20 weeks with vaginal bleeding    Negative: Pregnant < 37 weeks (i.e., ) and having contractions or other symptoms of labor    Negative: Pregnant 37 or more weeks (i.e., term) and having contractions or other symptoms of labor    Negative: Leakage of fluid (trickle, gush) from the vagina    Negative: Pregnant 23 or more weeks and baby is moving less today (e.g., kick count < 5 in 1 hour or < 10 in 2 hours)    Negative: Pregnant 24-36 weeks () and pinkish or brownish mucous discharge    Negative: Constant abdominal pain and present > 2 hours    Negative: Intermittent lower abdominal pain lasting > 24 hours    Negative: Patient sounds very sick or weak to the triager    Negative: Yellow or green vaginal discharge and fever    Negative: Genital area looks infected (e.g.,  draining sore, spreading redness)    Negative: Painful rash with tiny water blisters    Negative: Rash (e.g., redness, tiny bumps, sore) of genital area    Negative: Patient wants to be seen    Negative: Abnormal color vaginal discharge (i.e., yellow, green, gray)    Negative: Bad smelling vaginal discharge    Negative: Tender lump (swelling or 'ball') at vaginal opening    Negative: Home treatment > 3 days for 'yeast infection' and not improved    Negative: 4 or more episodes of vaginal infection in past year    Negative: Patient is worried they have a sexually transmitted infection (STI)    Answer Assessment - Initial Assessment Questions  1. DISCHARGE: \"Describe the discharge.\" (e.g., white, yellow, green, gray, foamy, cottage cheese-like)     White to clear  2. ODOR: \"Is there a bad odor?\"      No odor  3. ONSET: \"When did the discharge begin?\"      2024  4. RASH: \"Is there a rash in that area?\" If Yes, ask: \"Describe it.\" (e.g., redness, blisters, sores, bumps)      No  5. ABDOMEN PAIN: \"Are you having any abdomen pain?\" " "If Yes, ask: \"What does it feel like?\" (e.g., crampy, dull, intermittent, constant)       No cramping  6. ABDOMEN PAIN SEVERITY: If present, ask: \"How bad is it?\"  (e.g., Scale 1-10; mild, moderate, or severe)  No pain  7. CAUSE: \"What do you think is causing the discharge?\"      Unknown  8. OTHER SYMPTOMS: \"Do you have any other symptoms?\" (e.g., fever, itching, vaginal bleeding, pain with urination)      Denies pain with urination, vaginal bleeding,no itching  9. ZULY: \"What date are you expecting to deliver?\"       09-  10. PREGNANCY: \"How many weeks pregnant are you?\"        15 weeks    Protocols used: Pregnancy - Vaginal [id number]-FU    "

## 2024-04-09 NOTE — TELEPHONE ENCOUNTER
Patient called back and let her know provider's recommendation per note from previous note.     Huddled with Dr. Watson - maxi for pregnant patient to have mild  discharge, watch for changes. If it becomes red, or patient experiences cramping then go to ER.     Sabrina Romero RN on 4/9/2024 at 11:39 AM

## 2024-04-12 ENCOUNTER — PRENATAL OFFICE VISIT (OUTPATIENT)
Dept: FAMILY MEDICINE | Facility: CLINIC | Age: 24
End: 2024-04-12
Attending: STUDENT IN AN ORGANIZED HEALTH CARE EDUCATION/TRAINING PROGRAM
Payer: MEDICAID

## 2024-04-12 ENCOUNTER — HOSPITAL ENCOUNTER (EMERGENCY)
Facility: CLINIC | Age: 24
Discharge: HOME | End: 2024-04-12
Attending: FAMILY MEDICINE | Admitting: FAMILY MEDICINE
Payer: MEDICAID

## 2024-04-12 ENCOUNTER — APPOINTMENT (OUTPATIENT)
Dept: ULTRASOUND IMAGING | Facility: CLINIC | Age: 24
End: 2024-04-12
Attending: FAMILY MEDICINE
Payer: MEDICAID

## 2024-04-12 VITALS
BODY MASS INDEX: 30.28 KG/M2 | TEMPERATURE: 97.6 F | HEART RATE: 103 BPM | HEIGHT: 64 IN | WEIGHT: 177.38 LBS | RESPIRATION RATE: 20 BRPM | SYSTOLIC BLOOD PRESSURE: 128 MMHG | OXYGEN SATURATION: 99 % | DIASTOLIC BLOOD PRESSURE: 80 MMHG

## 2024-04-12 VITALS
HEIGHT: 63 IN | DIASTOLIC BLOOD PRESSURE: 65 MMHG | SYSTOLIC BLOOD PRESSURE: 112 MMHG | BODY MASS INDEX: 31.54 KG/M2 | HEART RATE: 78 BPM | OXYGEN SATURATION: 99 % | TEMPERATURE: 99.3 F | RESPIRATION RATE: 16 BRPM | WEIGHT: 178 LBS

## 2024-04-12 DIAGNOSIS — O09.92 HIGH-RISK PREGNANCY, SECOND TRIMESTER: Primary | ICD-10-CM

## 2024-04-12 DIAGNOSIS — N93.9 VAGINAL BLEEDING: ICD-10-CM

## 2024-04-12 DIAGNOSIS — O44.02 PLACENTA PREVIA IN SECOND TRIMESTER: ICD-10-CM

## 2024-04-12 DIAGNOSIS — O20.0 THREATENED MISCARRIAGE: ICD-10-CM

## 2024-04-12 DIAGNOSIS — Z3A.15 15 WEEKS GESTATION OF PREGNANCY: ICD-10-CM

## 2024-04-12 LAB
ALBUMIN UR-MCNC: NEGATIVE MG/DL
APPEARANCE UR: CLEAR
BACTERIA #/AREA URNS HPF: ABNORMAL /HPF
BILIRUB UR QL STRIP: NEGATIVE
COLOR UR AUTO: ABNORMAL
GLUCOSE UR STRIP-MCNC: NEGATIVE MG/DL
HCG INTACT+B SERPL-ACNC: 8966 MIU/ML
HGB UR QL STRIP: ABNORMAL
KETONES UR STRIP-MCNC: NEGATIVE MG/DL
LEUKOCYTE ESTERASE UR QL STRIP: NEGATIVE
NITRATE UR QL: NEGATIVE
PH UR STRIP: 8 [PH] (ref 5–7)
PROGEST SERPL-MCNC: 23.4 NG/ML
RBC URINE: 1 /HPF
SP GR UR STRIP: 1 (ref 1–1.03)
SQUAMOUS EPITHELIAL: 4 /HPF
UROBILINOGEN UR STRIP-MCNC: NORMAL MG/DL
WBC URINE: 0 /HPF

## 2024-04-12 PROCEDURE — 36415 COLL VENOUS BLD VENIPUNCTURE: CPT | Performed by: STUDENT IN AN ORGANIZED HEALTH CARE EDUCATION/TRAINING PROGRAM

## 2024-04-12 PROCEDURE — 99207 PR COMPLICATED OB VISIT: CPT | Performed by: STUDENT IN AN ORGANIZED HEALTH CARE EDUCATION/TRAINING PROGRAM

## 2024-04-12 PROCEDURE — 99284 EMERGENCY DEPT VISIT MOD MDM: CPT | Performed by: FAMILY MEDICINE

## 2024-04-12 PROCEDURE — 84702 CHORIONIC GONADOTROPIN TEST: CPT | Performed by: STUDENT IN AN ORGANIZED HEALTH CARE EDUCATION/TRAINING PROGRAM

## 2024-04-12 PROCEDURE — 84144 ASSAY OF PROGESTERONE: CPT | Performed by: STUDENT IN AN ORGANIZED HEALTH CARE EDUCATION/TRAINING PROGRAM

## 2024-04-12 PROCEDURE — 76817 TRANSVAGINAL US OBSTETRIC: CPT

## 2024-04-12 PROCEDURE — 99284 EMERGENCY DEPT VISIT MOD MDM: CPT | Mod: 25

## 2024-04-12 PROCEDURE — 81001 URINALYSIS AUTO W/SCOPE: CPT | Performed by: FAMILY MEDICINE

## 2024-04-12 ASSESSMENT — COLUMBIA-SUICIDE SEVERITY RATING SCALE - C-SSRS
6. HAVE YOU EVER DONE ANYTHING, STARTED TO DO ANYTHING, OR PREPARED TO DO ANYTHING TO END YOUR LIFE?: NO
2. HAVE YOU ACTUALLY HAD ANY THOUGHTS OF KILLING YOURSELF IN THE PAST MONTH?: NO
1. IN THE PAST MONTH, HAVE YOU WISHED YOU WERE DEAD OR WISHED YOU COULD GO TO SLEEP AND NOT WAKE UP?: NO

## 2024-04-12 ASSESSMENT — ACTIVITIES OF DAILY LIVING (ADL): ADLS_ACUITY_SCORE: 35

## 2024-04-12 ASSESSMENT — PAIN SCALES - GENERAL: PAINLEVEL: MODERATE PAIN (5)

## 2024-04-12 NOTE — ED PROVIDER NOTES
History     Chief Complaint   Patient presents with    Vaginal Bleeding - Pregnant     HPI  Lakeshia Phillips is a 23 year old female who presents with concerns of vaginal bleeding and abdominal pain.  Patient is about 15 weeks pregnant.  Patient is a G2, P1.  Patient states she was doing fine and then when she woke up this morning she went to the bathroom she noticed a lot of blood after urinating.  She then started having some crampy abdominal pain.  Denies any back pain.  Patient last had intercourse a day and a half ago.  Bowel movements have been normal.  Denies any recent trauma. Patient asked he had an appointment with her OB doctor later on today.    Allergies:  Allergies   Allergen Reactions    Tramadol Other (See Comments)     Anxiety/panic attacks       Problem List:    Patient Active Problem List    Diagnosis Date Noted    Urogenital infection by trichomonas vaginalis 2022     Priority: Medium    Uses intrauterine device for birth control 2020     Priority: Medium    Fetal size inconsistent with dates 2018     Priority: Medium    Threatened  in first trimester 10/04/2017     Priority: Medium    Chlamydia trachomatis infection in pregnancy 2017     Priority: Medium        Past Medical History:    Past Medical History:   Diagnosis Date    History of anxiety     History of depression        Past Surgical History:    Past Surgical History:   Procedure Laterality Date    APPENDECTOMY  2002    TONSILLECTOMY      WISDOM TOOTH EXTRACTION         Family History:    Family History   Problem Relation Age of Onset    Miscarriages / Stillbirths Mother     Seizure Disorder Mother     Unknown/Adopted Father     Diabetes Maternal Grandmother     Dementia Maternal Grandmother     Unknown/Adopted Maternal Grandfather     Unknown/Adopted Paternal Grandmother     Unknown/Adopted Paternal Grandfather     No Known Problems Daughter     Unknown/Adopted Half-Sister     Attention Deficit Disorder  "Half-Brother     No Known Problems Half-Brother     No Known Problems Half-Brother        Social History:  Marital Status:  Single [1]  Social History     Tobacco Use    Smoking status: Former     Types: Cigarettes    Smokeless tobacco: Never   Vaping Use    Vaping status: Every Day    Substances: Nicotine   Substance Use Topics    Alcohol use: Not Currently    Drug use: Not Currently     Types: Marijuana        Medications:    Prenatal Vit-Fe Fumarate-FA (PRENATAL MULTIVITAMIN  PLUS IRON) 27-1 MG TABS          Review of Systems   All other systems reviewed and are negative.      Physical Exam   BP: 129/65  Pulse: 100  Temp: 99.3  F (37.4  C)  Resp: 14  Height: 160 cm (5' 3\")  Weight: 80.7 kg (178 lb)  SpO2: 100 %      Physical Exam  Vitals and nursing note reviewed.   Constitutional:       General: She is not in acute distress.     Appearance: Normal appearance. She is not ill-appearing.   Cardiovascular:      Rate and Rhythm: Normal rate.      Pulses: Normal pulses.      Heart sounds: No murmur heard.  Pulmonary:      Effort: Pulmonary effort is normal. No respiratory distress.      Breath sounds: No wheezing or rales.   Abdominal:      General: Abdomen is flat. There is no distension.      Tenderness: There is abdominal tenderness (mild llq tenderness). There is no guarding.   Musculoskeletal:      Cervical back: Normal range of motion.   Neurological:      Mental Status: She is alert.         ED Course        Procedures    Results for orders placed during the hospital encounter of 04/12/24    POC US OB TRANSABDOMINAL LIMITED    Impression  [facility] Procedure Note    Limited Bedside ED Pelvic Ultrasound (PREGNANT PATIENT):    PROCEDURE: PERFORMED BY: Dr. King Garcia MD  INDICATIONS/SYMPTOM: Abdominal Pain and Vaginal Bleeding  PATIENT: Lakeshia Phillips MRN: [id number]  DATE: 04/12/24 TIME: 728  PROBE: Low frequency convex probe  Type of US Study: Transabdominal Exam  BODY LOCATION: " Pelvis  FINDINGS: Fetal heart rate: Present and counted at 160 bpm.  INTERPRETATION: Normal pelvic ultrasound with intrauterine pregnancy present. FHR was 160 with noted fetal activity.  No pelvic free fluid was present. No adnexal abnormality noted.  IMAGE DOCUMENTATION: Images were archived to PACs system.      Results for orders placed or performed during the hospital encounter of 04/12/24 (from the past 24 hour(s))   POC US OB TRANSABDOMINAL LIMITED    Impression    [facility] Procedure Note     Limited Bedside ED Pelvic Ultrasound (PREGNANT PATIENT):    PROCEDURE: PERFORMED BY: Dr. King Garcia MD  INDICATIONS/SYMPTOM: Abdominal Pain and Vaginal Bleeding  PATIENT: Lakeshia Phillips MRN: [id number]   DATE: 04/12/24 TIME: 728  PROBE: Low frequency convex probe  Type of US Study: Transabdominal Exam  BODY LOCATION: Pelvis  FINDINGS: Fetal heart rate: Present and counted at 160 bpm.  INTERPRETATION: Normal pelvic ultrasound with intrauterine pregnancy present. FHR was 160 with noted fetal activity.  No pelvic free fluid was present. No adnexal abnormality noted.  IMAGE DOCUMENTATION: Images were archived to PACs system.       US OB >14 Weeks with Transvaginal    Narrative    OBSTETRIC ULTRASOUND   4/12/2024 8:12 AM     HISTORY:  Vaginal bleeding, abdominal pain.    COMPARISON: Ultrasound 2/20/2024.    FINDINGS:  There is a single, live intrauterine pregnancy in variable  lie and variable presentation.    Heart rate: 156 bpm and regular rhythm. Fetal body movements are  normal.    Placental location: Posterior with borderline placenta previa. On one  of the images the placental edge is 1.2 cm away from the cervix, but  on other images it may just contact the periphery of the internal  cervical os. Adjacent to the inferior edge of the placenta near the  cervical os there is a hypoechoic collection that is approximately 1.5  x 0.8 x 1.5 cm.     Amniotic fluid: MVP is 3.0 cm.     Cervix: 6.6 cm and  appears closed.      Impression    IMPRESSION:   1. Single living intrauterine pregnancy. Fetal cardiac activity  detected.  2. Borderline placenta previa. A 1.5 cm hypoechoic structure  suspicious for small cell placental hematoma lying adjacent to the  internal cervical os.  3. Cervix measures 6.6 cm and appears closed.  4. Amniotic fluid appears normal.   UA with Microscopic reflex to Culture    Specimen: Urine, Midstream   Result Value Ref Range    Color Urine Straw Colorless, Straw, Light Yellow, Yellow    Appearance Urine Clear Clear    Glucose Urine Negative Negative mg/dL    Bilirubin Urine Negative Negative    Ketones Urine Negative Negative mg/dL    Specific Gravity Urine 1.002 (L) 1.003 - 1.035    Blood Urine Moderate (A) Negative    pH Urine 8.0 (H) 5.0 - 7.0    Protein Albumin Urine Negative Negative mg/dL    Urobilinogen Urine Normal Normal, 2.0 mg/dL    Nitrite Urine Negative Negative    Leukocyte Esterase Urine Negative Negative    Bacteria Urine Few (A) None Seen /HPF    RBC Urine 1 <=2 /HPF    WBC Urine 0 <=5 /HPF    Squamous Epithelials Urine 4 (H) <=1 /HPF    Narrative    Urine Culture not indicated       Medications - No data to display    I did a bedside ultrasound which did show an intrauterine pregnancy with a heart rate in the 160s.  Placenta looked really thickened and I did not see much fetal movement so I went ahead and ordered a formal ultrasound.  From ultrasound also showed fetal cardiac activity and they thought they could see a little bit of a placenta previa.  This could be the cause of the vaginal bleeding.  I did not do a vaginal or speculum exam, will defer that now.  Patient next has an appointment and an hour with her OB doctor.  At this point patient is stable, patient has what looks like is still a viable pregnancy.  Will go ahead and discharge the patient and patient will keep her appointment with her doctor in the next hour to go over these ultrasound results for this  possible placenta previa and to discuss the next steps.    Assessments & Plan (with Medical Decision Making)  Threatened miscarriage, placenta previa     I have reviewed the nursing notes.    I have reviewed the findings, diagnosis, plan and need for follow up with the patient.      New Prescriptions    No medications on file       Final diagnoses:   Threatened miscarriage   Placenta previa in second trimester       4/12/2024   [facility] EMERGENCY DEPT       King Garcia MD  04/12/24 4946

## 2024-04-12 NOTE — ED TRIAGE NOTES
Presents 15 weeks gestation with vaginal bleeding that started this Am. Reports cramping but denies passing clots     Triage Assessment (Adult)       Row Name 04/12/24 0708          Triage Assessment    Airway WDL WDL        Respiratory WDL    Respiratory WDL WDL        Skin Circulation/Temperature WDL    Skin Circulation/Temperature WDL WDL        Cardiac WDL    Cardiac WDL WDL        Peripheral/Neurovascular WDL    Peripheral Neurovascular WDL WDL        Cognitive/Neuro/Behavioral WDL    Cognitive/Neuro/Behavioral WDL WDL

## 2024-04-12 NOTE — PROGRESS NOTES
(O09.92) High-risk pregnancy, second trimester  (primary encounter diagnosis)  (Z3A.15) 15 weeks gestation of pregnancy  (O44.02) Placenta previa in second trimester  (N93.9) Vaginal bleeding  (O20.0) Threatened miscarriage  - Ultrasound showed a viable uterine pregnancy and a small pocket of blood  - Cervix closed on ultrasound, bleeding stopped, suggesting threatened miscarriage at best.  - Borderline placenta previa (1.5 cm from the cervix), could be source of bleeding.  - Pelvic rest recommended  - Avoid heavy lifting and overexertion  - Monitor for any further bleeding, especially if heavy, strict return/ED precautions reviewed.   - Blood tests to check beta HCG and progesterone levels serially ordered. Will start with 4 tests and discuss need for further studies based on results. Last test scheduled for next Friday.  - If heavy bleeding occurs, patient advised to go back in and get checked out  - If miscarriage occurs, patient may need help due to the stage of pregnancy  - Follow-up appointment in a month  - Anatomy ultrasound scheduled for May 7th  - Schedule remaining OB visits at 20, 24, 28, 32, 36 weeks and then weekly  Plan: hCG Quantitative Pregnancy, hCG Quantitative         Pregnancy, hCG Quantitative Pregnancy, hCG         Quantitative Pregnancy, Progesterone,         Progesterone, Progesterone, Progesterone    SUBJECTIVE:  Lakeshia Phillips is a 23 year old yo  at 15w4d weeks with an Estimated Date of Delivery: Sep 30, 2024.    reported vaginal bleeding early in the morning.  Noticed a gooey white discharge earlier in the week  Woke up at 6:30 AM, noticed bright red blood after using the bathroom  No prior similar incidents  No pain or cramping before the incident  Mild cramping in the lower abdomen after the incident  Was seen in [facility] ED for evaluation.  Ultrasound showed a viable uterine pregnancy and a small pocket of blood, possibly the source of bleeding    US OB >14 Weeks with  "Transvaginal     Narrative     OBSTETRIC ULTRASOUND   2024 8:12 AM      HISTORY:  Vaginal bleeding, abdominal pain.     COMPARISON: Ultrasound 2024.     FINDINGS:  There is a single, live intrauterine pregnancy in variable  lie and variable presentation.     Heart rate: 156 bpm and regular rhythm. Fetal body movements are  normal.     Placental location: Posterior with borderline placenta previa. On one  of the images the placental edge is 1.2 cm away from the cervix, but  on other images it may just contact the periphery of the internal  cervical os. Adjacent to the inferior edge of the placenta near the  cervical os there is a hypoechoic collection that is approximately 1.5  x 0.8 x 1.5 cm.      Amniotic fluid: MVP is 3.0 cm.      Cervix: 6.6 cm and appears closed.        Impression     IMPRESSION:   1. Single living intrauterine pregnancy. Fetal cardiac activity  detected.  2. Borderline placenta previa. A 1.5 cm hypoechoic structure  suspicious for small cell placental hematoma lying adjacent to the  internal cervical os.  3. Cervix measures 6.6 cm and appears closed.  4. Amniotic fluid appears normal.         OBJECTIVE:  /80   Pulse 103   Temp 97.6  F (36.4  C) (Temporal)   Resp 20   Ht 1.62 m (5' 3.78\")   Wt 80.5 kg (177 lb 6 oz)   LMP 2023   SpO2 99%   BMI 30.66 kg/m     Gen - well appearing  FHT not taken given assessment by US showing viability and FHT in ED today.     Pregnancy Issues   1. Placenta Previa: Placenta Previa:  At the 32-week follow-up examination:   If the placental edge is ?2 cm from the internal os, the placental position is reported as normal and additional follow-up ultrasound examinations for placental position are not indicated.   If the placental edge covers or is <2 cm from the internal os, a follow-up transvaginal ultrasound is indicated at 36 weeks.  At the 36-week follow-up examination:   If the placental edge covers the internal os,  birth is " scheduled, as the previa is likely to persist until the time of birth.   If the placental edge does not cover but is <2 cm from the internal os, the risks and benefits of a trial of labor should be discussed with the patient.

## 2024-04-13 ENCOUNTER — HOSPITAL ENCOUNTER (EMERGENCY)
Facility: CLINIC | Age: 24
Discharge: HOME | End: 2024-04-13
Attending: FAMILY MEDICINE | Admitting: FAMILY MEDICINE
Payer: MEDICAID

## 2024-04-13 VITALS
OXYGEN SATURATION: 100 % | BODY MASS INDEX: 31.96 KG/M2 | TEMPERATURE: 98.9 F | RESPIRATION RATE: 20 BRPM | HEIGHT: 63 IN | DIASTOLIC BLOOD PRESSURE: 62 MMHG | HEART RATE: 88 BPM | WEIGHT: 180.4 LBS | SYSTOLIC BLOOD PRESSURE: 114 MMHG

## 2024-04-13 DIAGNOSIS — O20.0 THREATENED MISCARRIAGE: ICD-10-CM

## 2024-04-13 PROCEDURE — 76815 OB US LIMITED FETUS(S): CPT | Mod: 26 | Performed by: FAMILY MEDICINE

## 2024-04-13 PROCEDURE — 99283 EMERGENCY DEPT VISIT LOW MDM: CPT | Mod: 25 | Performed by: FAMILY MEDICINE

## 2024-04-13 PROCEDURE — 76815 OB US LIMITED FETUS(S): CPT | Performed by: FAMILY MEDICINE

## 2024-04-13 PROCEDURE — 99284 EMERGENCY DEPT VISIT MOD MDM: CPT | Mod: 25 | Performed by: FAMILY MEDICINE

## 2024-04-13 ASSESSMENT — COLUMBIA-SUICIDE SEVERITY RATING SCALE - C-SSRS
1. IN THE PAST MONTH, HAVE YOU WISHED YOU WERE DEAD OR WISHED YOU COULD GO TO SLEEP AND NOT WAKE UP?: NO
6. HAVE YOU EVER DONE ANYTHING, STARTED TO DO ANYTHING, OR PREPARED TO DO ANYTHING TO END YOUR LIFE?: NO
2. HAVE YOU ACTUALLY HAD ANY THOUGHTS OF KILLING YOURSELF IN THE PAST MONTH?: NO

## 2024-04-13 ASSESSMENT — ACTIVITIES OF DAILY LIVING (ADL): ADLS_ACUITY_SCORE: 35

## 2024-04-13 NOTE — DISCHARGE INSTRUCTIONS
If you have any questions over the weekend you can give the birthplace a call, [phone]  Please return if you do start soaking through a pad completely every 20 to 30 minutes.  Also return if you start feeling lightheaded or dizzy.

## 2024-04-13 NOTE — ED PROVIDER NOTES
History     Chief Complaint   Patient presents with    Vaginal Bleeding - Pregnant     HPI  Lakeshia Phillips is a 23 year old female who presents back to the emergency department with complaints of continued vaginal bleeding.  Patient was seen here yesterday for vaginal bleeding and had a informal and formal ultrasound that showed viable pregnancy and a question of a placenta previa.  She then followed up with her primary care doctor couple hours later who confirmed findings and recommend just continue observation.  Also thought that this could be a threatened miscarriage.  Her provider told the patient to come to the emergency department though if any concerns over the weekend for some reason.  Patient states that this morning she had 6 some more vaginal bleeding that she noted after wiping, this was a little bit brighter and then she did pass a clot so she came directly to the ER.  She is not feeling lightheaded or dizzy.  She still having some lower abdominal cramping.  Patient is not soaking through a pad an hour.    Allergies:  Allergies   Allergen Reactions    Tramadol Other (See Comments)     Anxiety/panic attacks       Problem List:    Patient Active Problem List    Diagnosis Date Noted    Urogenital infection by trichomonas vaginalis 2022     Priority: Medium    Uses intrauterine device for birth control 2020     Priority: Medium    Fetal size inconsistent with dates 2018     Priority: Medium    Threatened  in first trimester 10/04/2017     Priority: Medium    Chlamydia trachomatis infection in pregnancy 2017     Priority: Medium        Past Medical History:    Past Medical History:   Diagnosis Date    History of anxiety     History of depression        Past Surgical History:    Past Surgical History:   Procedure Laterality Date    APPENDECTOMY  2002    TONSILLECTOMY      WISDOM TOOTH EXTRACTION         Family History:    Family History   Problem Relation Age of Onset     "Miscarriages / Stillbirths Mother     Seizure Disorder Mother     Unknown/Adopted Father     Diabetes Maternal Grandmother     Dementia Maternal Grandmother     Unknown/Adopted Maternal Grandfather     Unknown/Adopted Paternal Grandmother     Unknown/Adopted Paternal Grandfather     No Known Problems Daughter     Unknown/Adopted Half-Sister     Attention Deficit Disorder Half-Brother     No Known Problems Half-Brother     No Known Problems Half-Brother        Social History:  Marital Status:  Single [1]  Social History     Tobacco Use    Smoking status: Former     Types: Cigarettes    Smokeless tobacco: Never   Vaping Use    Vaping status: Every Day    Substances: Nicotine   Substance Use Topics    Alcohol use: Not Currently    Drug use: Not Currently     Types: Marijuana        Medications:    Prenatal Vit-Fe Fumarate-FA (PRENATAL MULTIVITAMIN  PLUS IRON) 27-1 MG TABS          Review of Systems   All other systems reviewed and are negative.      Physical Exam   BP: 117/73  Pulse: 97  Temp: 97.8  F (36.6  C)  Resp: 20  Height: 160 cm (5' 3\")  Weight: 81.8 kg (180 lb 6.4 oz)  SpO2: 100 %      Physical Exam  Vitals and nursing note reviewed.   Constitutional:       General: She is not in acute distress.     Appearance: Normal appearance. She is not ill-appearing.   Cardiovascular:      Rate and Rhythm: Normal rate.      Pulses: Normal pulses.   Pulmonary:      Effort: Pulmonary effort is normal. No respiratory distress.      Breath sounds: Normal breath sounds. No wheezing.   Abdominal:      General: Abdomen is flat. There is no distension.      Tenderness: There is no abdominal tenderness. There is no guarding.   Neurological:      Mental Status: She is alert.         ED Course        Procedures    Results for orders placed during the hospital encounter of 04/13/24    POC US OB TRANSABDOMINAL LIMITED    [facility] Procedure Note    Limited Bedside ED Pelvic Ultrasound (PREGNANT " PATIENT):    PROCEDURE: PERFORMED BY: Dr. King Garcia MD  INDICATIONS/SYMPTOM: Vaginal Bleeding  PATIENT: Lakeshia Phillips MRN: [id number]  DATE: 04/13/24 TIME: 1004  PROBE: Low frequency convex probe  Type of US Study: Transabdominal Exam  BODY LOCATION: Pelvis  FINDINGS: Fetal heart rate: Present and counted at 160 bpm.  Images were hard to see because of fetal positioning but I did see active fetal movement, and heart rate of 160  INTERPRETATION: Normal pelvic ultrasound with intrauterine pregnancy present. FHR was 160 with noted fetal activity.  No pelvic free fluid was present. No adnexal abnormality noted.  IMAGE DOCUMENTATION: Images were archived to PACs system.           Results for orders placed or performed during the hospital encounter of 04/13/24 (from the past 24 hour(s))   POC US OB TRANSABDOMINAL LIMITED    [facility] Procedure Note     Limited Bedside ED Pelvic Ultrasound (PREGNANT PATIENT):    PROCEDURE: PERFORMED BY: Dr. King Garcia MD  INDICATIONS/SYMPTOM: Vaginal Bleeding  PATIENT: Lakeshia Phillips MRN: [id number]   DATE: 04/13/24 TIME: 1004  PROBE: Low frequency convex probe  Type of US Study: Transabdominal Exam  BODY LOCATION: Pelvis  FINDINGS: Fetal heart rate: Present and counted at 160 bpm.  Images were hard to see because of fetal positioning but I did see active fetal movement, and heart rate of 160  INTERPRETATION: Normal pelvic ultrasound with intrauterine pregnancy present. FHR was 160 with noted fetal activity.  No pelvic free fluid was present. No adnexal abnormality noted.  IMAGE DOCUMENTATION: Images were archived to PACs system.           Medications - No data to display    Bedside ultrasound once again shows cardiac activity with a rate around 160, and active fetal movement.  I reassured the patient again and went over strict reasons why she should come back, soaking through a pad every 20 to 30 minutes.  I did call the on-call OB doc  today, Dr. Guevara who could have agreed with the assessment.  At 14 weeks she is not viable so there is really nothing that we can do for this in the emergency department.  I did encourage her she had any questions over the weekend to call the on-call OB doctor.  Otherwise I recommend for her to follow-up with her OB provider on Monday.    Assessments & Plan (with Medical Decision Making)  Threatened miscarriage     I have reviewed the nursing notes.    I have reviewed the findings, diagnosis, plan and need for follow up with the patient.      New Prescriptions    No medications on file       Final diagnoses:   Threatened miscarriage       4/13/2024   [facility] EMERGENCY DEPT       King Garcia MD  04/13/24 1029

## 2024-04-13 NOTE — ED TRIAGE NOTES
Pt reports having some vaginal bleeding yesterday, was seen in the ED and with her PCP, today reports increased vaginal bleeding and passing a clot when she was showering.      Triage Assessment (Adult)       Row Name 04/13/24 0929          Triage Assessment    Airway WDL WDL        Respiratory WDL    Respiratory WDL WDL        Skin Circulation/Temperature WDL    Skin Circulation/Temperature WDL WDL        Cardiac WDL    Cardiac WDL WDL        Peripheral/Neurovascular WDL    Peripheral Neurovascular WDL WDL

## 2024-04-15 ENCOUNTER — LAB (OUTPATIENT)
Dept: LAB | Facility: CLINIC | Age: 24
End: 2024-04-15
Payer: MEDICAID

## 2024-04-15 DIAGNOSIS — O20.0 THREATENED MISCARRIAGE: ICD-10-CM

## 2024-04-15 DIAGNOSIS — N93.9 VAGINAL BLEEDING: ICD-10-CM

## 2024-04-15 LAB — HCG INTACT+B SERPL-ACNC: 8696 MIU/ML

## 2024-04-15 PROCEDURE — 84702 CHORIONIC GONADOTROPIN TEST: CPT

## 2024-04-15 PROCEDURE — 36415 COLL VENOUS BLD VENIPUNCTURE: CPT

## 2024-04-15 PROCEDURE — 84144 ASSAY OF PROGESTERONE: CPT

## 2024-04-16 ENCOUNTER — MYC MEDICAL ADVICE (OUTPATIENT)
Dept: FAMILY MEDICINE | Facility: CLINIC | Age: 24
End: 2024-04-16
Payer: MEDICAID

## 2024-04-16 LAB
ABO/RH(D): ABNORMAL
ANTIBODY SCREEN: POSITIVE
PROGEST SERPL-MCNC: 24.4 NG/ML
SPECIMEN EXPIRATION DATE: ABNORMAL

## 2024-04-17 ENCOUNTER — LAB (OUTPATIENT)
Dept: LAB | Facility: CLINIC | Age: 24
End: 2024-04-17
Payer: MEDICAID

## 2024-04-17 ENCOUNTER — TELEPHONE (OUTPATIENT)
Dept: FAMILY MEDICINE | Facility: CLINIC | Age: 24
End: 2024-04-17

## 2024-04-17 DIAGNOSIS — Z34.80 SUPERVISION OF OTHER NORMAL PREGNANCY, ANTEPARTUM: ICD-10-CM

## 2024-04-17 DIAGNOSIS — N93.9 VAGINAL BLEEDING: ICD-10-CM

## 2024-04-17 DIAGNOSIS — O20.0 THREATENED MISCARRIAGE: ICD-10-CM

## 2024-04-17 DIAGNOSIS — Z34.90 ENCOUNTER FOR SUPERVISION OF NORMAL PREGNANCY, ANTEPARTUM, UNSPECIFIED GRAVIDITY: ICD-10-CM

## 2024-04-17 DIAGNOSIS — Z86.19 HISTORY OF CHLAMYDIA INFECTION: ICD-10-CM

## 2024-04-17 DIAGNOSIS — Z34.81 NORMAL PREGNANCY IN MULTIGRAVIDA IN FIRST TRIMESTER: ICD-10-CM

## 2024-04-17 DIAGNOSIS — Z34.90 SUPERVISION OF NORMAL PREGNANCY: ICD-10-CM

## 2024-04-17 LAB
ANTIBODY ID: NORMAL
C AG RBC QL: NEGATIVE
CLUE CELLS: ABNORMAL
HCG INTACT+B SERPL-ACNC: 8108 MIU/ML
HOLD SPECIMEN: NORMAL
HOLD SPECIMEN: NORMAL
SPECIMEN EXPIRATION DATE: NORMAL
TRICHOMONAS, WET PREP: ABNORMAL
WBC'S/HIGH POWER FIELD, WET PREP: ABNORMAL
XXX BLOOD GROUP AB TITR SERPL: <1 {TITER}
YEAST, WET PREP: ABNORMAL

## 2024-04-17 PROCEDURE — 87491 CHLMYD TRACH DNA AMP PROBE: CPT

## 2024-04-17 PROCEDURE — 84144 ASSAY OF PROGESTERONE: CPT

## 2024-04-17 PROCEDURE — 86886 COOMBS TEST INDIRECT TITER: CPT

## 2024-04-17 PROCEDURE — 87210 SMEAR WET MOUNT SALINE/INK: CPT | Performed by: STUDENT IN AN ORGANIZED HEALTH CARE EDUCATION/TRAINING PROGRAM

## 2024-04-17 PROCEDURE — 86905 BLOOD TYPING RBC ANTIGENS: CPT

## 2024-04-17 PROCEDURE — 86850 RBC ANTIBODY SCREEN: CPT

## 2024-04-17 PROCEDURE — 87591 N.GONORRHOEAE DNA AMP PROB: CPT

## 2024-04-17 PROCEDURE — 86870 RBC ANTIBODY IDENTIFICATION: CPT

## 2024-04-17 PROCEDURE — 84702 CHORIONIC GONADOTROPIN TEST: CPT

## 2024-04-17 PROCEDURE — 86901 BLOOD TYPING SEROLOGIC RH(D): CPT

## 2024-04-17 PROCEDURE — 36415 COLL VENOUS BLD VENIPUNCTURE: CPT

## 2024-04-17 PROCEDURE — 86900 BLOOD TYPING SEROLOGIC ABO: CPT

## 2024-04-17 NOTE — TELEPHONE ENCOUNTER
Blood bank calling with a critical for this patient that there was a positive antibody screening.     Routed to PCP for review and huddled in person.    Sabrina Romero RN on 4/17/2024 at 4:13 PM

## 2024-04-18 LAB
C TRACH DNA SPEC QL NAA+PROBE: NEGATIVE
N GONORRHOEA DNA SPEC QL NAA+PROBE: NEGATIVE

## 2024-04-18 NOTE — TELEPHONE ENCOUNTER
Spoke to Blood Bank Provider. Nothing to do now. No treatment indicated. If patient were to miscarry, would potentially have implications for future pregnancies. If pregnancy continues, may necessitate MFM referral. Titer pending, this will help inform MFM management. However, no treatment is available for this specific antibody. Anti-c.

## 2024-04-18 NOTE — TELEPHONE ENCOUNTER
Tena greer,   [facility] Pool8 hours ago (11:17 PM)     RO  There are no medications to change this hormone. Like we discussed in our last visit, much that happens in this part of pregnancy is out of our hands and we are left to watch and wait. There is little we can do to intervene.

## 2024-04-19 ENCOUNTER — LAB (OUTPATIENT)
Dept: LAB | Facility: CLINIC | Age: 24
End: 2024-04-19
Payer: MEDICAID

## 2024-04-19 ENCOUNTER — MYC MEDICAL ADVICE (OUTPATIENT)
Dept: FAMILY MEDICINE | Facility: CLINIC | Age: 24
End: 2024-04-19

## 2024-04-19 DIAGNOSIS — O20.0 THREATENED MISCARRIAGE: ICD-10-CM

## 2024-04-19 DIAGNOSIS — O20.0 THREATENED MISCARRIAGE: Primary | ICD-10-CM

## 2024-04-19 LAB
HCG INTACT+B SERPL-ACNC: 7376 MIU/ML
PROGEST SERPL-MCNC: 20.4 NG/ML

## 2024-04-19 PROCEDURE — 84144 ASSAY OF PROGESTERONE: CPT

## 2024-04-19 PROCEDURE — 36415 COLL VENOUS BLD VENIPUNCTURE: CPT

## 2024-04-19 PROCEDURE — 84702 CHORIONIC GONADOTROPIN TEST: CPT

## 2024-04-21 LAB — PROGEST SERPL-MCNC: 24.8 NG/ML

## 2024-04-22 ENCOUNTER — PRENATAL OFFICE VISIT (OUTPATIENT)
Dept: FAMILY MEDICINE | Facility: CLINIC | Age: 24
End: 2024-04-22
Payer: MEDICAID

## 2024-04-22 ENCOUNTER — HOSPITAL ENCOUNTER (OUTPATIENT)
Dept: ULTRASOUND IMAGING | Facility: CLINIC | Age: 24
Discharge: HOME | End: 2024-04-22
Attending: STUDENT IN AN ORGANIZED HEALTH CARE EDUCATION/TRAINING PROGRAM | Admitting: STUDENT IN AN ORGANIZED HEALTH CARE EDUCATION/TRAINING PROGRAM
Payer: MEDICAID

## 2024-04-22 VITALS
DIASTOLIC BLOOD PRESSURE: 70 MMHG | BODY MASS INDEX: 31.89 KG/M2 | OXYGEN SATURATION: 96 % | RESPIRATION RATE: 16 BRPM | TEMPERATURE: 98.3 F | SYSTOLIC BLOOD PRESSURE: 122 MMHG | HEART RATE: 82 BPM | WEIGHT: 180 LBS

## 2024-04-22 DIAGNOSIS — O20.0 THREATENED MISCARRIAGE: Primary | ICD-10-CM

## 2024-04-22 DIAGNOSIS — O36.1920 MATERNAL ATYPICAL ANTIBODY AFFECTING PREGNANCY IN SECOND TRIMESTER, SINGLE OR UNSPECIFIED FETUS: ICD-10-CM

## 2024-04-22 DIAGNOSIS — O20.0 THREATENED MISCARRIAGE: ICD-10-CM

## 2024-04-22 LAB — PROGEST SERPL-MCNC: 21.5 NG/ML

## 2024-04-22 PROCEDURE — 76816 OB US FOLLOW-UP PER FETUS: CPT

## 2024-04-22 PROCEDURE — 36415 COLL VENOUS BLD VENIPUNCTURE: CPT | Performed by: STUDENT IN AN ORGANIZED HEALTH CARE EDUCATION/TRAINING PROGRAM

## 2024-04-22 PROCEDURE — 99215 OFFICE O/P EST HI 40 MIN: CPT | Performed by: STUDENT IN AN ORGANIZED HEALTH CARE EDUCATION/TRAINING PROGRAM

## 2024-04-22 PROCEDURE — 84144 ASSAY OF PROGESTERONE: CPT | Performed by: STUDENT IN AN ORGANIZED HEALTH CARE EDUCATION/TRAINING PROGRAM

## 2024-04-22 ASSESSMENT — PAIN SCALES - GENERAL: PAINLEVEL: NO PAIN (0)

## 2024-04-22 NOTE — PROGRESS NOTES
Assessment & Plan     Threatened miscarriage  - Patient's progesterone levels are lower than expected and beta HCG levels have been decreasing. However, patient has been able to detect a heartbeat at home using a Doppler.   - US today showed a still viable IUP with a HR of 170 bpm. Given patient has not had recurrence of VB, shared decision making developed the plan of monitoring the progesterone levels until the anatomy ultrasound appointment. If everything looks good at that point, monitoring will stop unless something else comes up.   - If at any point in the future there is confirmation of fetal demise, options will include expectant management, medication, or surgical intervention. If that time comes, the patient  has indicated she would prefer to proceed with surgical intervention.   - Risks associated with not intervening if the baby does not come out on its own were discussed, including the risk of infection and DIC.  - Also discussed recommendations from Blood bank to refer to [facility] if pregnancy did not miscarry due to noted anti-c antibody finding. Will refer today and can opt to discuss low progesterone, as well, if desires.   - Patient verbalized understanding and all questions were answered.   - Regular appointment on 5/17  - Anatomy ultrasound scheduled for 5/7  - Progesterone; Future  - Progesterone    Tierra Asher is a 24 year old, presenting for the following health issues:  Prenatal Care      4/22/2024     9:29 AM   Additional Questions   Roomed by Monie RODRIGUEZ     No recent bleeding reported  Patient has been able to detect a heartbeat at home using a Doppler  Patient had two ultrasounds in ED consecutive days with a heartbeat detected  showing viable IUP  Patient has an ultrasound scheduled for today  Patient's progesterone levels are lower than expected for her stage of pregnancy, but did increase slightly last check prior to today  Patient's beta HCG levels have been decreasing,  discussed is possible this is related to point in pregnancy.    Reviewed considerations regarding anti-c antibody finding, implications, and MFM.      Objective    /70   Pulse 82   Temp 98.3  F (36.8  C) (Temporal)   Resp 16   Wt 81.6 kg (180 lb)   LMP 12/25/2023   SpO2 96%   BMI 31.89 kg/m    Body mass index is 31.89 kg/m .  Physical Exam   GENERAL: alert and no acute distress  EYES: Eyes grossly normal to inspection, PERRL and conjunctivae and sclerae normal  HENT: nose and mouth without ulcers or lesions  RESP: normal rate and effort  CV: regular rate, no peripheral edema  ABDOMEN: soft, nontender, non-distended  MS: no gross musculoskeletal defects noted, no edema  SKIN: no suspicious lesions or rashes  NEURO: Normal strength and tone, mentation intact and speech normal  PSYCH: mentation appears normal, affect normal/bright      US OB >14 Weeks Follow Up  Result Date: 4/22/2024  ULTRASOUND OBSTETRIC FOLLOW-UP GREATER THAN FOURTEEN WEEKS  4/22/2024 11:17 AM HISTORY: Threatened miscarriage. COMPARISON: 4/12/2024. FINDINGS: Presentation: Breech. Cardiac activity: 170 bpm. Regular rhythm. Movement: Unremarkable. Placenta: Right lateral posterior. No evidence for placenta previa. Adnexa: Unremarkable. Cervical length: 6.7 cm. Other findings: The stomach, kidneys, and bladder views are unremarkable where seen. The four-chamber heart could not be visualized due to positioning. A complete anatomy scan was not performed.     IMPRESSION:  1. Single living intrauterine pregnancy in breech presentation. 2. Nonvisualization of the four-chamber heart. LEILA MORIN MD   SYSTEM ID:  [id number]         Signed Electronically by: Tena López DO  A total of 40 minutes were spent on this visit on the day of the encounter, on: chart review, history, assessment, exam, results review, documentation and discussing the assessment and plan as above with the patient.

## 2024-04-23 ENCOUNTER — TRANSCRIBE ORDERS (OUTPATIENT)
Dept: MATERNAL FETAL MEDICINE | Facility: CLINIC | Age: 24
End: 2024-04-23
Payer: MEDICAID

## 2024-04-23 DIAGNOSIS — O26.90 PREGNANCY RELATED CONDITION, ANTEPARTUM: Primary | ICD-10-CM

## 2024-04-24 ENCOUNTER — MYC MEDICAL ADVICE (OUTPATIENT)
Dept: FAMILY MEDICINE | Facility: CLINIC | Age: 24
End: 2024-04-24
Payer: MEDICAID

## 2024-04-25 ENCOUNTER — LAB (OUTPATIENT)
Dept: LAB | Facility: CLINIC | Age: 24
End: 2024-04-25
Payer: MEDICAID

## 2024-04-25 DIAGNOSIS — O20.0 THREATENED MISCARRIAGE: ICD-10-CM

## 2024-04-25 PROCEDURE — 84144 ASSAY OF PROGESTERONE: CPT

## 2024-04-25 PROCEDURE — 36415 COLL VENOUS BLD VENIPUNCTURE: CPT

## 2024-04-26 ENCOUNTER — ALLIED HEALTH/NURSE VISIT (OUTPATIENT)
Dept: FAMILY MEDICINE | Facility: CLINIC | Age: 24
End: 2024-04-26
Payer: MEDICAID

## 2024-04-26 DIAGNOSIS — Z11.1 VISIT FOR MANTOUX TEST: Primary | ICD-10-CM

## 2024-04-26 LAB — PROGEST SERPL-MCNC: 23.2 NG/ML

## 2024-04-26 PROCEDURE — 99207 PR NO CHARGE NURSE ONLY: CPT

## 2024-04-26 PROCEDURE — 86580 TB INTRADERMAL TEST: CPT

## 2024-04-26 NOTE — PROGRESS NOTES
"Patient is here today for a Mantoux (TST) test placement.    Is there a current order in the chart? No. Placed order according to standing order (reference the \"Skin Test- Tuberculosis Screening- Ambulatory Care\" standing order in [employer]). Review the Inclusion and Exclusion Criteria.      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 Patient to return for second test in 1-3 weeks after first test is read.     Exclusion Criteria  None - Place order for Mantoux (TST) test per standing order.    Reason for Mantoux (TST) in patient's own words: Needs it for work.    Patient needs form signed? No - form not needed per patient.    Instructed patient to wait for 15 minutes post injection and to report any reactions immediately to staff.    Told patient to return to clinic in 48-72 hours to have Mantoux (TST) read.        Sherine Lion LPN  "

## 2024-04-28 ENCOUNTER — HOSPITAL ENCOUNTER (EMERGENCY)
Facility: CLINIC | Age: 24
Discharge: HOME | End: 2024-04-28
Attending: STUDENT IN AN ORGANIZED HEALTH CARE EDUCATION/TRAINING PROGRAM | Admitting: STUDENT IN AN ORGANIZED HEALTH CARE EDUCATION/TRAINING PROGRAM
Payer: MEDICAID

## 2024-04-28 ENCOUNTER — APPOINTMENT (OUTPATIENT)
Dept: ULTRASOUND IMAGING | Facility: CLINIC | Age: 24
End: 2024-04-28
Attending: STUDENT IN AN ORGANIZED HEALTH CARE EDUCATION/TRAINING PROGRAM
Payer: MEDICAID

## 2024-04-28 VITALS
SYSTOLIC BLOOD PRESSURE: 102 MMHG | BODY MASS INDEX: 32.25 KG/M2 | TEMPERATURE: 98.5 F | OXYGEN SATURATION: 96 % | HEIGHT: 63 IN | DIASTOLIC BLOOD PRESSURE: 73 MMHG | HEART RATE: 100 BPM | RESPIRATION RATE: 20 BRPM | WEIGHT: 182 LBS

## 2024-04-28 DIAGNOSIS — A08.4 VIRAL GASTROENTERITIS: ICD-10-CM

## 2024-04-28 LAB
ALBUMIN SERPL BCG-MCNC: 4.1 G/DL (ref 3.5–5.2)
ALBUMIN UR-MCNC: NEGATIVE MG/DL
ALP SERPL-CCNC: 59 U/L (ref 40–150)
ALT SERPL W P-5'-P-CCNC: 59 U/L (ref 0–50)
ANION GAP SERPL CALCULATED.3IONS-SCNC: 13 MMOL/L (ref 7–15)
APPEARANCE UR: CLEAR
AST SERPL W P-5'-P-CCNC: 46 U/L (ref 0–45)
BASOPHILS # BLD AUTO: 0 10E3/UL (ref 0–0.2)
BASOPHILS NFR BLD AUTO: 0 %
BILIRUB SERPL-MCNC: 0.3 MG/DL
BILIRUB UR QL STRIP: NEGATIVE
BUN SERPL-MCNC: 6.9 MG/DL (ref 6–20)
CALCIUM SERPL-MCNC: 9.2 MG/DL (ref 8.6–10)
CHLORIDE SERPL-SCNC: 100 MMOL/L (ref 98–107)
COLOR UR AUTO: YELLOW
CREAT SERPL-MCNC: 0.45 MG/DL (ref 0.51–0.95)
DEPRECATED HCO3 PLAS-SCNC: 20 MMOL/L (ref 22–29)
EGFRCR SERPLBLD CKD-EPI 2021: >90 ML/MIN/1.73M2
EOSINOPHIL # BLD AUTO: 0 10E3/UL (ref 0–0.7)
EOSINOPHIL NFR BLD AUTO: 0 %
ERYTHROCYTE [DISTWIDTH] IN BLOOD BY AUTOMATED COUNT: 12.5 % (ref 10–15)
FLUAV RNA SPEC QL NAA+PROBE: NEGATIVE
FLUBV RNA RESP QL NAA+PROBE: NEGATIVE
GLUCOSE SERPL-MCNC: 89 MG/DL (ref 70–99)
GLUCOSE UR STRIP-MCNC: NEGATIVE MG/DL
HCT VFR BLD AUTO: 31.5 % (ref 35–47)
HGB BLD-MCNC: 10.8 G/DL (ref 11.7–15.7)
HGB UR QL STRIP: ABNORMAL
IMM GRANULOCYTES # BLD: 0.1 10E3/UL
IMM GRANULOCYTES NFR BLD: 1 %
KETONES UR STRIP-MCNC: 20 MG/DL
LEUKOCYTE ESTERASE UR QL STRIP: NEGATIVE
LYMPHOCYTES # BLD AUTO: 0.9 10E3/UL (ref 0.8–5.3)
LYMPHOCYTES NFR BLD AUTO: 8 %
MCH RBC QN AUTO: 31.6 PG (ref 26.5–33)
MCHC RBC AUTO-ENTMCNC: 34.3 G/DL (ref 31.5–36.5)
MCV RBC AUTO: 92 FL (ref 78–100)
MONOCYTES # BLD AUTO: 0.7 10E3/UL (ref 0–1.3)
MONOCYTES NFR BLD AUTO: 6 %
MUCOUS THREADS #/AREA URNS LPF: PRESENT /LPF
NEUTROPHILS # BLD AUTO: 9.5 10E3/UL (ref 1.6–8.3)
NEUTROPHILS NFR BLD AUTO: 85 %
NITRATE UR QL: NEGATIVE
NRBC # BLD AUTO: 0 10E3/UL
NRBC BLD AUTO-RTO: 0 /100
PH UR STRIP: 7 [PH] (ref 5–7)
PLATELET # BLD AUTO: 313 10E3/UL (ref 150–450)
POTASSIUM SERPL-SCNC: 3.8 MMOL/L (ref 3.4–5.3)
PROT SERPL-MCNC: 7 G/DL (ref 6.4–8.3)
RBC # BLD AUTO: 3.42 10E6/UL (ref 3.8–5.2)
RBC URINE: 9 /HPF
RSV RNA SPEC NAA+PROBE: NEGATIVE
SARS-COV-2 RNA RESP QL NAA+PROBE: NEGATIVE
SODIUM SERPL-SCNC: 133 MMOL/L (ref 135–145)
SP GR UR STRIP: 1.01 (ref 1–1.03)
SQUAMOUS EPITHELIAL: 3 /HPF
UROBILINOGEN UR STRIP-MCNC: NORMAL MG/DL
WBC # BLD AUTO: 11.3 10E3/UL (ref 4–11)
WBC URINE: 1 /HPF

## 2024-04-28 PROCEDURE — 96361 HYDRATE IV INFUSION ADD-ON: CPT | Performed by: STUDENT IN AN ORGANIZED HEALTH CARE EDUCATION/TRAINING PROGRAM

## 2024-04-28 PROCEDURE — 76705 ECHO EXAM OF ABDOMEN: CPT

## 2024-04-28 PROCEDURE — 250N000013 HC RX MED GY IP 250 OP 250 PS 637: Performed by: STUDENT IN AN ORGANIZED HEALTH CARE EDUCATION/TRAINING PROGRAM

## 2024-04-28 PROCEDURE — 81001 URINALYSIS AUTO W/SCOPE: CPT | Performed by: STUDENT IN AN ORGANIZED HEALTH CARE EDUCATION/TRAINING PROGRAM

## 2024-04-28 PROCEDURE — 99284 EMERGENCY DEPT VISIT MOD MDM: CPT | Performed by: STUDENT IN AN ORGANIZED HEALTH CARE EDUCATION/TRAINING PROGRAM

## 2024-04-28 PROCEDURE — 80053 COMPREHEN METABOLIC PANEL: CPT | Performed by: STUDENT IN AN ORGANIZED HEALTH CARE EDUCATION/TRAINING PROGRAM

## 2024-04-28 PROCEDURE — 36415 COLL VENOUS BLD VENIPUNCTURE: CPT | Performed by: STUDENT IN AN ORGANIZED HEALTH CARE EDUCATION/TRAINING PROGRAM

## 2024-04-28 PROCEDURE — 258N000003 HC RX IP 258 OP 636: Performed by: STUDENT IN AN ORGANIZED HEALTH CARE EDUCATION/TRAINING PROGRAM

## 2024-04-28 PROCEDURE — 85025 COMPLETE CBC W/AUTO DIFF WBC: CPT | Performed by: STUDENT IN AN ORGANIZED HEALTH CARE EDUCATION/TRAINING PROGRAM

## 2024-04-28 PROCEDURE — 99285 EMERGENCY DEPT VISIT HI MDM: CPT | Mod: 25 | Performed by: STUDENT IN AN ORGANIZED HEALTH CARE EDUCATION/TRAINING PROGRAM

## 2024-04-28 PROCEDURE — 87637 SARSCOV2&INF A&B&RSV AMP PRB: CPT | Performed by: STUDENT IN AN ORGANIZED HEALTH CARE EDUCATION/TRAINING PROGRAM

## 2024-04-28 PROCEDURE — 250N000011 HC RX IP 250 OP 636: Performed by: STUDENT IN AN ORGANIZED HEALTH CARE EDUCATION/TRAINING PROGRAM

## 2024-04-28 PROCEDURE — 96374 THER/PROPH/DIAG INJ IV PUSH: CPT | Performed by: STUDENT IN AN ORGANIZED HEALTH CARE EDUCATION/TRAINING PROGRAM

## 2024-04-28 RX ORDER — ONDANSETRON 4 MG/1
4 TABLET, ORALLY DISINTEGRATING ORAL EVERY 8 HOURS PRN
Qty: 10 TABLET | Refills: 0 | Status: SHIPPED | OUTPATIENT
Start: 2024-04-28 | End: 2024-05-01

## 2024-04-28 RX ORDER — ONDANSETRON 2 MG/ML
4 INJECTION INTRAMUSCULAR; INTRAVENOUS EVERY 30 MIN PRN
Status: DISCONTINUED | OUTPATIENT
Start: 2024-04-28 | End: 2024-04-28 | Stop reason: HOSPADM

## 2024-04-28 RX ORDER — ONDANSETRON 4 MG/1
4 TABLET, ORALLY DISINTEGRATING ORAL EVERY 8 HOURS PRN
Qty: 10 TABLET | Refills: 0 | Status: SHIPPED | OUTPATIENT
Start: 2024-04-28 | End: 2024-04-28

## 2024-04-28 RX ORDER — ACETAMINOPHEN 500 MG
1000 TABLET ORAL ONCE
Status: COMPLETED | OUTPATIENT
Start: 2024-04-28 | End: 2024-04-28

## 2024-04-28 RX ADMIN — ACETAMINOPHEN 1000 MG: 500 TABLET ORAL at 12:53

## 2024-04-28 RX ADMIN — ONDANSETRON 4 MG: 2 INJECTION INTRAMUSCULAR; INTRAVENOUS at 10:50

## 2024-04-28 RX ADMIN — SODIUM CHLORIDE 1000 ML: 9 INJECTION, SOLUTION INTRAVENOUS at 13:31

## 2024-04-28 RX ADMIN — SODIUM CHLORIDE 1000 ML: 9 INJECTION, SOLUTION INTRAVENOUS at 10:49

## 2024-04-28 ASSESSMENT — ENCOUNTER SYMPTOMS
DIARRHEA: 1
VOMITING: 1
ABDOMINAL PAIN: 1
NAUSEA: 1

## 2024-04-28 ASSESSMENT — ACTIVITIES OF DAILY LIVING (ADL)
ADLS_ACUITY_SCORE: 35

## 2024-04-28 NOTE — DISCHARGE INSTRUCTIONS
Please follow-up with your OB/GYN in the next 2 to 3 days for reevaluation.  A message was sent to your provider who will reach out to you tomorrow morning.  Please return if you feel your symptoms are worsening or if you start having vaginal bleeding or any new symptoms.  Please return if you start having any concerning vaginal bleeding or worsening pains fevers or nausea vomiting unable to keep anything down may require further IV fluids to be provided.  We sent Zofran to your pharmacy to help with any nausea and allow you to be able to tolerate fluids appropriately without any further vomiting.  Please continue take Tylenol to help with body aches and chills.

## 2024-04-28 NOTE — ED PROVIDER NOTES
History     Chief Complaint   Patient presents with    Abdominal Pain    Vomiting     HPI  Laekshia Phillips is a 24 year old female G2, P1 at 17 weeks and 6 days presenting with generalized abdominal discomfort that started this morning with associated nausea vomiting and increased bowel.  No significant watery like stools but much softer and more frequent.  Nonbilious none bloody vomiting and no blood in the stools.  Notes cramping with the associated nausea vomiting and diarrhea that has not subsided significantly.  She has been able to drink much fluid today but is been trying to keep up with her normal amount of fluid.  She has a history of threatened miscarriage in this pregnancy, none ABO and antibody c positive therefore currently pending an [facility] consult, chlamydia/trachomatis infection prior to pregnancy.  Patient is here with her  and her daughter.  She has not had any fevers but notes she had some mild chills.  She is a caregiver for 3 other older adults.  She is not sure of any sick contacts.  She has not had any new foods recent travel or new medications provided.  Has an upcoming anatomy scan on 7 May.  Denies any abdominal trauma.      Allergies:  Allergies   Allergen Reactions    Blood Transfusion Related (Informational Only) Other (See Comments)     Patient has a history of a clinically significant antibody against RBC antigens.  A delay in compatible RBCs may occur.     Tramadol Other (See Comments)     Anxiety/panic attacks       Problem List:    Patient Active Problem List    Diagnosis Date Noted    Urogenital infection by trichomonas vaginalis 2022     Priority: Medium    Uses intrauterine device for birth control 2020     Priority: Medium    Fetal size inconsistent with dates 2018     Priority: Medium    Threatened  in first trimester 10/04/2017     Priority: Medium    Chlamydia trachomatis infection in pregnancy 2017     Priority: Medium        Past  "Medical History:    Past Medical History:   Diagnosis Date    History of anxiety     History of depression        Past Surgical History:    Past Surgical History:   Procedure Laterality Date    APPENDECTOMY  2002    TONSILLECTOMY      WISDOM TOOTH EXTRACTION         Family History:    Family History   Problem Relation Age of Onset    Miscarriages / Stillbirths Mother     Seizure Disorder Mother     Unknown/Adopted Father     Diabetes Maternal Grandmother     Dementia Maternal Grandmother     Unknown/Adopted Maternal Grandfather     Unknown/Adopted Paternal Grandmother     Unknown/Adopted Paternal Grandfather     No Known Problems Daughter     Unknown/Adopted Half-Sister     Attention Deficit Disorder Half-Brother     No Known Problems Half-Brother     No Known Problems Half-Brother        Social History:  Marital Status:  Single [1]  Social History     Tobacco Use    Smoking status: Former     Types: Cigarettes    Smokeless tobacco: Never   Vaping Use    Vaping status: Every Day    Substances: Nicotine   Substance Use Topics    Alcohol use: Not Currently    Drug use: Not Currently     Types: Marijuana        Medications:    ondansetron (ZOFRAN ODT) 4 MG ODT tab  Prenatal Vit-Fe Fumarate-FA (PRENATAL MULTIVITAMIN  PLUS IRON) 27-1 MG TABS          Review of Systems   Gastrointestinal:  Positive for abdominal pain, diarrhea, nausea and vomiting.   All other systems reviewed and are negative.      Physical Exam   BP: 111/71  Pulse: 112  Temp: 98.5  F (36.9  C)  Resp: 20  Height: 160 cm (5' 3\")  Weight: 82.6 kg (182 lb)  SpO2: 97 %      Physical Exam  Vitals and nursing note reviewed.   Constitutional:       General: She is not in acute distress.     Appearance: She is well-developed and normal weight. She is not ill-appearing, toxic-appearing or diaphoretic.   HENT:      Head: Normocephalic and atraumatic.      Mouth/Throat:      Mouth: Mucous membranes are moist.   Eyes:      Extraocular Movements: Extraocular " movements intact.      Pupils: Pupils are equal, round, and reactive to light.   Cardiovascular:      Rate and Rhythm: Normal rate and regular rhythm.      Heart sounds: Normal heart sounds.   Pulmonary:      Effort: Pulmonary effort is normal. No respiratory distress.      Breath sounds: Normal breath sounds. No wheezing or rales.   Abdominal:      General: Abdomen is flat. Bowel sounds are normal.      Palpations: Abdomen is soft.      Tenderness: There is generalized abdominal tenderness. There is no right CVA tenderness, left CVA tenderness or guarding. Negative signs include Lind's sign.      Comments: Uterus palpable approximately 1 inch below umbilicus.  No bogginess noted no significant tenderness over the uterus identified.   Genitourinary:     Vagina: Normal. No vaginal discharge, tenderness or bleeding.   Skin:     General: Skin is warm and dry.      Capillary Refill: Capillary refill takes less than 2 seconds.      Coloration: Skin is not mottled or pale.   Neurological:      Mental Status: She is alert.         ED Course        Procedures           Results for orders placed or performed during the hospital encounter of 04/28/24 (from the past 24 hour(s))   Asymptomatic Influenza A/B, RSV, & SARS-CoV2 PCR (COVID-19) Nasopharyngeal    Specimen: Nasopharyngeal; Swab   Result Value Ref Range    Influenza A PCR Negative Negative    Influenza B PCR Negative Negative    RSV PCR Negative Negative    SARS CoV2 PCR Negative Negative    Narrative    Testing was performed using the Xpert Xpress CoV2/Flu/RSV Assay on the [employer] GeneXpert Instrument. This test should be ordered for the detection of SARS-CoV-2, influenza, and RSV viruses in individuals who meet clinical and/or epidemiological criteria. Test performance is unknown in asymptomatic patients. This test is for in vitro diagnostic use under the FDA EUA for laboratories certified under CLIA to perform high or moderate complexity testing. This test has not  been FDA cleared or approved. A negative result does not rule out the presence of PCR inhibitors in the specimen or target RNA in concentration below the limit of detection for the assay. If only one viral target is positive but coinfection with multiple targets is suspected, the sample should be re-tested with another FDA cleared, approved, or authorized test, if coinfection would change clinical management. This test was validated by the [facility]. These laboratories are certified under the Clinical Laboratory Improvement Amendments of 1988 (CLIA-88) as qualified to perform high complexity laboratory testing.   CBC with platelets differential    Narrative    The following orders were created for panel order CBC with platelets differential.  Procedure                               Abnormality         Status                     ---------                               -----------         ------                     CBC with platelets and d...[[id number]]  Abnormal            Final result                 Please view results for these tests on the individual orders.   Comprehensive metabolic panel   Result Value Ref Range    Sodium 133 (L) 135 - 145 mmol/L    Potassium 3.8 3.4 - 5.3 mmol/L    Carbon Dioxide (CO2) 20 (L) 22 - 29 mmol/L    Anion Gap 13 7 - 15 mmol/L    Urea Nitrogen 6.9 6.0 - 20.0 mg/dL    Creatinine 0.45 (L) 0.51 - 0.95 mg/dL    GFR Estimate >90 >60 mL/min/1.73m2    Calcium 9.2 8.6 - 10.0 mg/dL    Chloride 100 98 - 107 mmol/L    Glucose 89 70 - 99 mg/dL    Alkaline Phosphatase 59 40 - 150 U/L    AST 46 (H) 0 - 45 U/L    ALT 59 (H) 0 - 50 U/L    Protein Total 7.0 6.4 - 8.3 g/dL    Albumin 4.1 3.5 - 5.2 g/dL    Bilirubin Total 0.3 <=1.2 mg/dL   CBC with platelets and differential   Result Value Ref Range    WBC Count 11.3 (H) 4.0 - 11.0 10e3/uL    RBC Count 3.42 (L) 3.80 - 5.20 10e6/uL    Hemoglobin 10.8 (L) 11.7 - 15.7 g/dL    Hematocrit 31.5 (L) 35.0 - 47.0 %    MCV 92 78 - 100 fL     MCH 31.6 26.5 - 33.0 pg    MCHC 34.3 31.5 - 36.5 g/dL    RDW 12.5 10.0 - 15.0 %    Platelet Count 313 150 - 450 10e3/uL    % Neutrophils 85 %    % Lymphocytes 8 %    % Monocytes 6 %    % Eosinophils 0 %    % Basophils 0 %    % Immature Granulocytes 1 %    NRBCs per 100 WBC 0 <1 /100    Absolute Neutrophils 9.5 (H) 1.6 - 8.3 10e3/uL    Absolute Lymphocytes 0.9 0.8 - 5.3 10e3/uL    Absolute Monocytes 0.7 0.0 - 1.3 10e3/uL    Absolute Eosinophils 0.0 0.0 - 0.7 10e3/uL    Absolute Basophils 0.0 0.0 - 0.2 10e3/uL    Absolute Immature Granulocytes 0.1 <=0.4 10e3/uL    Absolute NRBCs 0.0 10e3/uL   UA with Microscopic reflex to Culture    Specimen: Urine, Clean Catch   Result Value Ref Range    Color Urine Yellow Colorless, Straw, Light Yellow, Yellow    Appearance Urine Clear Clear    Glucose Urine Negative Negative mg/dL    Bilirubin Urine Negative Negative    Ketones Urine 20 (A) Negative mg/dL    Specific Gravity Urine 1.014 1.003 - 1.035    Blood Urine Small (A) Negative    pH Urine 7.0 5.0 - 7.0    Protein Albumin Urine Negative Negative mg/dL    Urobilinogen Urine Normal Normal, 2.0 mg/dL    Nitrite Urine Negative Negative    Leukocyte Esterase Urine Negative Negative    Mucus Urine Present (A) None Seen /LPF    RBC Urine 9 (H) <=2 /HPF    WBC Urine 1 <=5 /HPF    Squamous Epithelials Urine 3 (H) <=1 /HPF    Narrative    Urine Culture not indicated       Medications   ondansetron (ZOFRAN) injection 4 mg (4 mg Intravenous $Given 24 1050)   sodium chloride 0.9% BOLUS 1,000 mL (1,000 mLs Intravenous $New Bag 24 1331)   sodium chloride 0.9% BOLUS 1,000 mL (0 mLs Intravenous Stopped 24 1154)   acetaminophen (TYLENOL) tablet 1,000 mg (1,000 mg Oral $Given 24 1253)       Assessments & Plan (with Medical Decision Making)     I have reviewed the nursing notes.    I have reviewed the findings, diagnosis, plan and need for follow up with the patient.      Medical Decision Making    24-year-old   presenting with abdominal discomfort.  She is 17 and 6 weeks pregnant at this time.  Associated nausea vomiting abdominal cramping and diarrhea.  No significant fevers or runny nose congestion cough noted.  Caregiver for 3 other people.  Has a daughter and it is  at bedside without any symptoms.  Denies recent travel or new foods.  Mildly tachycardic on presentation of 112 and seems moderately anxious.    Patient provided with 1 L normal saline fluids, Zofran, Tylenol.  Temperature improved.  Heart rate decrease less than 100 and blood pressure remained stable.  On reevaluation patient still feels like she is uncomfortable and feels mildly better after fluids and Tylenol and has not felt any more nausea or significant abdominal cramping.  However at bedside patient's heart rate still remaining in the low 100s and blood pressure is intermittently dropping down to 90s over 50s.  Discussed providing second liter of normal saline fluids and reevaluating.  OB consult attempted however busy at this time unable to return phone call.    With patient's diffuse abdominal pain that is otherwise mild to moderate with no significant point tenderness area or any signs of acute abdomen or endometrial discomfort and noted nausea vomiting diarrhea with bodyaches and chills/fever likely  gastroenteritis in nature.       patient's lab work showing mild elevated liver enzymes and white count 11.3 CMP otherwise mildly unremarkable aside from as discussed AST and ALT and a sodium of 133 with a bicarb of 20.  Urinalysis is negative aside from some ketones and patient CBC with a mild anemia of 10.8 and a platelet count of 313.  Negative COVID flu and RSV.    Dedicated ultrasound to the right flank ordered with negative cholecystitis however did find a mild hydro likely secondary to pregnancy as patient's urinalysis without blood or signs of infection concerning for any gallstones/ureteral stones or plantar fasciitis.    Patient is  receiving second liter has improvement of her blood pressure as well as her heart rate.  She feels improved and would like to be discharged home.  Sent Zofran to patient's pharmacy.  Sent message to patient's primary care provider for outpatient follow-up tomorrow.  Return precautions discussed patient discharged home      New Prescriptions    ONDANSETRON (ZOFRAN ODT) 4 MG ODT TAB    Take 1 tablet (4 mg) by mouth every 8 hours as needed       Final diagnoses:   Viral gastroenteritis       4/28/2024   [facility] EMERGENCY DEPT       Conor Granados MD  04/28/24 1537

## 2024-04-28 NOTE — ED TRIAGE NOTES
Pt reports she started to experience generalized abdominal pain, nausea, vomiting and lower extremity pain around 0400 this morning. Pt tearful in triage and is almost 18 weeks pregnant. She reports high risk pregnancy. They state they have a baby HR monitor at home that read 160s. She states she has no longer had any morning sickness and has not had any vaginal bleeding.      Triage Assessment (Adult)       Row Name 04/28/24 1002          Triage Assessment    Airway WDL WDL        Respiratory WDL    Respiratory WDL WDL        Skin Circulation/Temperature WDL    Skin Circulation/Temperature WDL WDL        Cardiac WDL    Cardiac WDL WDL        Peripheral/Neurovascular WDL    Peripheral Neurovascular WDL WDL        Cognitive/Neuro/Behavioral WDL    Cognitive/Neuro/Behavioral WDL WDL

## 2024-04-29 ENCOUNTER — LAB (OUTPATIENT)
Dept: LAB | Facility: CLINIC | Age: 24
End: 2024-04-29
Payer: MEDICAID

## 2024-04-29 ENCOUNTER — ALLIED HEALTH/NURSE VISIT (OUTPATIENT)
Dept: FAMILY MEDICINE | Facility: CLINIC | Age: 24
End: 2024-04-29
Payer: MEDICAID

## 2024-04-29 ENCOUNTER — MYC MEDICAL ADVICE (OUTPATIENT)
Dept: FAMILY MEDICINE | Facility: CLINIC | Age: 24
End: 2024-04-29

## 2024-04-29 DIAGNOSIS — N93.9 VAGINAL BLEEDING: ICD-10-CM

## 2024-04-29 DIAGNOSIS — O20.0 THREATENED MISCARRIAGE: ICD-10-CM

## 2024-04-29 DIAGNOSIS — Z11.1 SCREENING EXAMINATION FOR PULMONARY TUBERCULOSIS: Primary | ICD-10-CM

## 2024-04-29 LAB
PPDINDURATION: 0 MM (ref 0–4.99)
PPDREDNESS: NORMAL
PROGEST SERPL-MCNC: 16.7 NG/ML

## 2024-04-29 PROCEDURE — 99207 PR NO CHARGE NURSE ONLY: CPT

## 2024-04-29 PROCEDURE — 84144 ASSAY OF PROGESTERONE: CPT

## 2024-04-29 PROCEDURE — 36415 COLL VENOUS BLD VENIPUNCTURE: CPT

## 2024-04-29 NOTE — PROGRESS NOTES
It was placed 4/26/24 on left forearm at 1:30 pm    Patient is here today for a Mantoux (TST) test results.    Mantoux was read by Sabrina Romero RN on 4/29/24 at 11:10 am    Did patient return to clinic 48-72 hours from Mantoux (TST) placement: Yes -     PPD Induration   Date Value Ref Range Status   04/29/2024 0 0 - 4.99 mm Final     PPD Redness   Date Value Ref Range Status   04/29/2024 Not Present  Final     Induration Size? Induration <5mm - Enter results in Enter/Edit Activity. Route results to ordering provider.     Patient needs form signed? Yes. Follow clinic form process.     Patient reports having previously had the BCG Vaccine: No    Does patient need a two step? No    Sabrina Romero RN on 4/29/2024 at 11:14 AM

## 2024-04-30 NOTE — TELEPHONE ENCOUNTER
RN Triage    Patient Contact    Attempt # 1    Was call answered?  No.  Left message on voicemail with information to call me back.    Also sent patient a WiTricityhart message.    REMY Brantley, RN

## 2024-04-30 NOTE — TELEPHONE ENCOUNTER
Patient called and is unsure of next steps and would like clarification if she she should make appt. With MFM or be seen in ED.     Progesterone again going back up, but still low. Would discuss with MFM.   Written by Tena López DO on 4/26/2024  5:07 PM CDT  Seen by patient Lakeshia Phillips on 4/29/2024  7:46 PM    Please advise.    Denia Mackey RN  [facility] - Registered Nurse  Clinic Triage [place]   April 30, 2024

## 2024-05-01 ENCOUNTER — MYC MEDICAL ADVICE (OUTPATIENT)
Dept: FAMILY MEDICINE | Facility: CLINIC | Age: 24
End: 2024-05-01
Payer: MEDICAID

## 2024-05-01 ENCOUNTER — NURSE TRIAGE (OUTPATIENT)
Dept: FAMILY MEDICINE | Facility: CLINIC | Age: 24
End: 2024-05-01
Payer: MEDICAID

## 2024-05-01 NOTE — TELEPHONE ENCOUNTER
"S-(situation): Vaginal discharge  B-(background): Patient is 18 weeks and 2 days pregnant. Progesterone levels are low at 16.7.    A-(assessment): Patient started getting a clear \"jelly\" like discharge since yesterday. Patient notices the discharge every time she goes to the bathroom. She states it is about a quarter size every time. Patient denies fever, bloody discharge and abdominal cramps. Patient also denies any blood in the discharge.     R-(recommendations): Per RN protocol, home care is sufficient. Patient would like provider to review and assure this is normal.     Additional Information   Negative: Pregnant 20 or more weeks with vaginal bleeding   Negative: Pregnant < 20 weeks with vaginal bleeding   Negative: Pregnant < 37 weeks (i.e., ) and having contractions or other symptoms of labor   Negative: Pregnant 37 or more weeks (i.e., term) and having contractions or other symptoms of labor   Negative: Leakage of fluid (trickle, gush) from the vagina   Negative: Pregnant 24-36 weeks () and pinkish or brownish mucous discharge   Negative: Constant abdominal pain and present > 2 hours   Negative: Intermittent lower abdominal pain lasting > 24 hours   Negative: Patient sounds very sick or weak to the triager   Negative: Yellow or green vaginal discharge and fever   Negative: Genital area looks infected (e.g.,  draining sore, spreading redness)   Negative: Painful rash with tiny water blisters   Negative: Rash (e.g., redness, tiny bumps, sore) of genital area   Negative: Patient wants to be seen   Negative: Abnormal color vaginal discharge (i.e., yellow, green, gray)   Negative: Bad smelling vaginal discharge   Negative: Tender lump (swelling or 'ball') at vaginal opening   Negative: Home treatment > 3 days for 'yeast infection' and not improved   Negative: 4 or more episodes of vaginal infection in past year   Negative: Patient is worried they have a sexually transmitted infection (STI)   " Negative: Pain with sexual intercourse (dyspareunia)   Negative: Normal vaginal discharge in pregnancy   Negative: Symptoms of a vaginal yeast infection (i.e., white, thick, cottage-cheese-like, itchy, not bad smelling discharge)   Negative: Pregnant 37 or more weeks (i.e., term) and pinkish or brownish mucous discharge   Negative: Pregnant 37 or more weeks (i.e., term) and passed a small glob or chunk of mucous (may look like gelatin or snot)    Protocols used: Pregnancy - Vaginal Fjmuwrgnd-L-CW

## 2024-05-01 NOTE — TELEPHONE ENCOUNTER
RN TRIAGE CALL:    Patient Contact    Attempt # 1    Was call answered?  No.  Left message on voicemail with information to call any one of the triage nurses back at [phone] to help answer some questions that were sent through My Chart.  My Chart message sent.    Latisha Luna, RN      Lakeshia PHILLIP [facility] (supporting Tena López DO)42 minutes ago (12:46 PM)     VB  Hello I ve been having clear jelly like discharge ? Should I be concern ?

## 2024-05-02 ENCOUNTER — PRE VISIT (OUTPATIENT)
Dept: MATERNAL FETAL MEDICINE | Facility: CLINIC | Age: 24
End: 2024-05-02
Payer: MEDICAID

## 2024-05-03 NOTE — TELEPHONE ENCOUNTER
Tena López DO  [facility]30 minutes ago (6:56 AM)     RO  Standard comments for discharge in pregnancy: there are many forms of normal discharge in pregnancy. To differentiate between normal and abnormal typically focuses on the features of the discharge such as unusual color, foul smell, presence of blood, and additional symptoms to suggest infections like itching or pain. IF the discharge has none of these additional features it could me normal for her. If she does have additional symptoms, it could support having an e-visit at a minimum to evaluate whether swabs or urinalysis would be indicated to rule out infection.     Attempt #1 to call.     RN has attempted to contact this patient by phone to return their call, but there is no response. RN left voicemail and requested return call to [facility] at [phone]    REMY De La Fuente, RN

## 2024-05-03 NOTE — TELEPHONE ENCOUNTER
Patient called back    Vaginal discharge:  unusual color - patient denies  foul smell - patient denies  presence of blood - patient denies  Itching - patient denies  pain - patient denies    Patient verbalized understanding and no further questions at this time.    Rosalia Griggs RN on 5/3/2024 at 9:14 AM

## 2024-05-07 ENCOUNTER — OFFICE VISIT (OUTPATIENT)
Dept: MATERNAL FETAL MEDICINE | Facility: CLINIC | Age: 24
End: 2024-05-07
Attending: OBSTETRICS & GYNECOLOGY
Payer: MEDICAID

## 2024-05-07 ENCOUNTER — HOSPITAL ENCOUNTER (OUTPATIENT)
Dept: ULTRASOUND IMAGING | Facility: CLINIC | Age: 24
Discharge: HOME | End: 2024-05-07
Attending: OBSTETRICS & GYNECOLOGY
Payer: MEDICAID

## 2024-05-07 ENCOUNTER — TRANSCRIBE ORDERS (OUTPATIENT)
Dept: MATERNAL FETAL MEDICINE | Facility: CLINIC | Age: 24
End: 2024-05-07
Payer: MEDICAID

## 2024-05-07 VITALS
DIASTOLIC BLOOD PRESSURE: 73 MMHG | SYSTOLIC BLOOD PRESSURE: 120 MMHG | OXYGEN SATURATION: 97 % | TEMPERATURE: 98 F | RESPIRATION RATE: 18 BRPM | HEART RATE: 68 BPM

## 2024-05-07 DIAGNOSIS — O36.1990 MATERNAL ATYPICAL ANTIBODY AFFECTING PREGNANCY, ANTEPARTUM, SINGLE OR UNSPECIFIED FETUS: Primary | ICD-10-CM

## 2024-05-07 DIAGNOSIS — Z36.5 ENCOUNTER FOR ANTENATAL SCREENING FOR ISOIMMUNIZATION: Primary | ICD-10-CM

## 2024-05-07 DIAGNOSIS — O26.92 PREGNANCY RELATED CONDITION IN SECOND TRIMESTER: ICD-10-CM

## 2024-05-07 DIAGNOSIS — O26.90 PREGNANCY RELATED CONDITION, ANTEPARTUM: Primary | ICD-10-CM

## 2024-05-07 DIAGNOSIS — O26.90 PREGNANCY RELATED CONDITION, ANTEPARTUM: ICD-10-CM

## 2024-05-07 DIAGNOSIS — Z36.9 ENCOUNTER FOR ANTENATAL SCREENING: ICD-10-CM

## 2024-05-07 DIAGNOSIS — Z31.5 ENCOUNTER FOR PROCREATIVE GENETIC COUNSELING: ICD-10-CM

## 2024-05-07 PROCEDURE — 76811 OB US DETAILED SNGL FETUS: CPT

## 2024-05-07 PROCEDURE — 96040 HC GENETIC COUNSELING, EACH 30 MINUTES: CPT

## 2024-05-07 PROCEDURE — 99214 OFFICE O/P EST MOD 30 MIN: CPT | Mod: 25 | Performed by: OBSTETRICS & GYNECOLOGY

## 2024-05-07 PROCEDURE — 76817 TRANSVAGINAL US OBSTETRIC: CPT | Mod: 26 | Performed by: OBSTETRICS & GYNECOLOGY

## 2024-05-07 PROCEDURE — 99204 OFFICE O/P NEW MOD 45 MIN: CPT | Mod: 25 | Performed by: OBSTETRICS & GYNECOLOGY

## 2024-05-07 PROCEDURE — 76817 TRANSVAGINAL US OBSTETRIC: CPT

## 2024-05-07 PROCEDURE — 76811 OB US DETAILED SNGL FETUS: CPT | Mod: 26 | Performed by: OBSTETRICS & GYNECOLOGY

## 2024-05-07 NOTE — NURSING NOTE
Reviewed dating with pt, ZULY based on LMP (pt states cycle was 1st post stoppage of birth control, early ultrasound within 3 days of LMP dating). VSS. Reviewed medications and allergies. Education provided to patient on ultrasound.  SBAR given to NAS ARREOLA, see their note in Epic.

## 2024-05-07 NOTE — PROGRESS NOTES
[facility]  [address] [place] [address] [address], [place] [address]  Main: [phone], Fax: [phone]       Referring Provider:  Rene RODRIGUEZ     Lakeshia Phillips is a 24 year old  at 19w1d by LMP c/w 19w4d US consistent  here for MFM consultation regarding anti-c antibody and vaginal bleeding earlier in pregnancy.     Lakeshia is here to discuss recent finding of anti-c antibody.  She did not have this in a prior pregnancy.  She has not had a prior blood transfusion.     She has had vaginal bleeding in this pregnancy for which she has been closely followed in this pregnancy clinically and with serial progesterone and hcg measurements.       Her primary concern today is related to how likely it is to expect fetal loss at this stage in pregnancy.  She is overall feeling well and notes that she hasn't had vaginal bleeding in the last several weeks.     Prenatal Care:  Primary OB care this pregnancy has been with Dr. López from [facility].     OB History    Para Term  AB Living   2 1 1 0 0 0   SAB IAB Ectopic Multiple Live Births   0 0 0 0 0      # Outcome Date GA Lbr Jose/2nd Weight Sex Type Anes PTL Lv   2 Current            1 Term 18 39w0d  2.438 kg (5 lb 6 oz) F Vag-Spont   NAMITA      Name: Zeinab      Obstetric Comments   EDC 2024 based on LMP.  Parenting with Jon.  This will be their first delivery at [facility]       Gynecologic History   - Denies any history of abnormal pap smears  - Denies prior cervical surgery or procedures  - Denies any history of frequent UTIs, vaginal infections, or STIs    Past Medical History     Past Medical History:   Diagnosis Date    History of anxiety     as a teenager    History of depression     as a teenager       Past Surgical History     Past Surgical History:   Procedure Laterality Date    APPENDECTOMY  2002    TONSILLECTOMY      WISDOM TOOTH EXTRACTION         Medication List     Prior to Admission medications   "  Medication Sig Last Dose Taking? Auth Provider Long Term End Date   Prenatal Vit-Fe Fumarate-FA (PRENATAL MULTIVITAMIN  PLUS IRON) 27-1 MG TABS Take 1 tablet by mouth daily Taking Yes Tena López, DO         Labs   Anti c < 1    Ultrasound   See today's ultrasound report under the \"imaging\" tab.    Assessment/Counseling     Lakeshia Phillips is a 24 year old  at 19w1d by egarding anti-c antibody and vaginal bleeding earlier in pregnancy.      Anti-C antibody:   The presence of c antibodies carries a risk of fetal hemolytic disease    She was counseled that antibodies develop when a foreign antigen or protein is detected by her immune system. and thus causes her to form an antibody against the antigen. It is likely that she was exposed to a c antigen during her prior pregnancy.  She was counseled that this type of antibody can potentially affect her pregnancy. If the antibodies are in high concentration, they can potentially cross the placenta and cause hemolytic disease of the fetus, resulting in devastating anemia.  The patient was counseled that the fetus is not typically at risk until the titer increases to 1:16.  Given that her titer is < 1, we recommend that the patient have her titer checked monthly throughout the pregnancy. If the titer increases to 8, we discussed that the patient will have to be referred back for fetal monitoring via MCA Dopplers very 1 -2 weeks to check for fetal anemia.    We reviewed options for assessing paternal RBC antigen status.  They will defer at this time, though would consider should titers increase.       Vaginal bleeding earlier in pregnancy:   We reviewed that her history of vaginal bleeding in pregnancy.  US today shows some hyperechoic material near the cervix, likely consistent with prior clot.  We reviewed that vaginal bleeding earlier in pregnancy can potentially increase risk for PPROM and  delivery.  I encouraged her to seek care if bright red bleeding " or if concerns for rupture of membranes were to occur          Recommendations     Genetic screening  - Met with genetic counseling (see separate note for full details)  - Had low risk CFFDNA for genetic screening  -Is aware of options for paternal RBC typing.     Laboratory evaluation  - recommend checking antibody titers monthly, if 1:8 refer back to [facility] for additional evaluation.     Ultrasound surveillance  - Given concerns for fetal well being early in pregnancy, will plan to assess growth at 28 weeks with MFM.  If not concerns at that time, will return to routine prenatal care.   At the end of our discussion, Ms. Phillips indicated that her questions were answered and she seemed satisfied with our discussion.  Thank you for allowing us to participate in the care of your patient. Please do not hesitate to contact us if you have further questions regarding the management of your patient.       Sincerely,    Sarah Castro MD PhD  Department of Obstetrics, Gynecology and Women's Health  Maternal [facility]etal Medicine Division        I spent a total of 40 minutes on the date of this encounter including preparing to see the patient (reviewing medical records/tests), counseling and discussing the plan of care, documenting the visit in the electronic medical record, and communicating with other health care professionals and/or care coordination.

## 2024-05-07 NOTE — PROGRESS NOTES
[facility]  Genetic Counseling Consult    Patient:  Lakeshia Phillips  Preferred Name: Lakeshia YOB: 2000   Date of Service:  24   MRN: [id number]    Lakeshia was seen at the [facility] for genetic consultation. The indication for genetic counseling is anti-C alloimmunization. The patient was accompanied to this visit by their partner, Jon, and daughter.    The session was conducted in English.      IMPRESSION/ PLAN   1. Lakeshia had genetic screening earlier in this pregnancy. Their non-invasive prenatal test was screen negative or low risk for screened conditions     2. Options for [facility] NIPT and paternal antigen C typing were discussed. These options will remain in the future.     3. Since the patient chose aneuploidy screening via NIPT, quad screen is NOT recommended in the second trimester. If the patient desires screening for open neural tube defects, maternal serum AFP only is recommended, ideally between 16-18 weeks gestation.    4. Carrier screening was briefly mentioned today. Options for carrier screening can be further revisited in the future, if desired.     5. Lakeshia had a level II comprehensive anatomy ultrasound today. Please see the ultrasound report for further details.    6. Further recommendation include a follow-up ultrasound with MF. The upcoming ultrasound has been scheduled for 2024.    PREGNANCY HISTORY   /Parity:       Lakeshia's pregnancy history is significant for:   Term female ()  Current pregnancy ()    CURRENT PREGNANCY   Current Age: 24 year old   Age at Delivery: 24 year old  ZULY: 2024, by Last Menstrual Period                                   Gestational Age: 19w1d  This pregnancy is a single gestation.   This pregnancy was conceived spontaneously.    The patient and her partner expressed an emotional stress during this pregnancy in  discussions regarding the risk for miscarriage. They are hoping for guidance today regarding what risks there truly are for this pregnancy. Psychosocial support was provided.    MEDICAL HISTORY   Lakeshia s reported personal medical history is not expected to impact pregnancy management or risks to fetal development. Please see discussion on anti-c antibodies below.    ALLOIMMUNIZATION   Today, we reviewed Lakeshia's anti-c antibodies detected on her prenatal antibody screen. We reviewed what red blood antigens are and discussed that her lab testing showed that her body makes antibodies to the red blood cell C protein/antigen. We described how pregnant individuals most commonly develop anti-C antibodies by exposure to the C protein/antigen either through a blood transfusion or through having a past pregnancy in which the fetus was antigen C-positive, because the other parent of the fetus also has that blood antigen and passed it on.    We reviewed how anti-C antibodies can be an issue during pregnancy because these antibodies can cross the placenta and into the fetus; if the fetus had C protein on their red blood cells, the anti-C antibodies can attach to their red blood cells and destroy them. We discussed that this can be important because if too many red blood cells are destroyed, this can lead to clinically significant fetal anemia. We briefly reviewed that fetal anemia can be a very severe problem for a fetus in utero and that, because of this, additional testing and screening for fetal anemia may be recommended for pregnant individuals with anti-C antibodies. We further reviewed that fetal therapy (such as intrauterine blood transfusion) is often offered to someone whose fetus has severe anemia.    We went on to review that we would only expect these anti-C antibodies to cause a problem for a developing fetus if the fetus has C antigen on their red blood cells. We discussed that Lakeshia does not have C  protein/antigen on her red blood cells since her body produced antibodies against them, and so she likely does not carry a genetic variant for the C protein/antigen. The only way for her fetus to have C protein/antigen is if the father of her pregnancy, Jon, has the genetic variation for the C antigen and the fetus inherited that genetic variation as well.     We reviewed that antigen typing could be performed for Lakeshia's partner, Jon, to see if he has C antigen on his red blood cells. If he is negative for C, then we would not expect the fetus to be at risk (since the fetus wouldn't be expected to have C protein in this circumstance). If Jon has two copies of C, then there is a 100% chance the fetus will also have the c antigen. However, if Jon has one copy of C, then there is a 50% chance the fetus will have the antigen. It is important to determine this to assess risks for future pregnancies. In the case of a 50% chance, an amniocentesis is an option to determine the baby's blood antigens. [facility] noninvasive prenatal screening by [employer] [employer] also has the option to assess fetal antigen status for RhD (in non-alloimmunized individuals), C, c, Cevallos, E, or Summit. Fetal antigen typing through this method is reported to have a sensitivity and specificity greater than 99.9%, but it is not considered diagnostic like an amniocentesis.    Lakeshia has an [facility] consultation today with Dr. Castro. We discussed that she would talk more with them about this information and about her testing and screening recommendations for this pregnancy. Please see the [facility] consultation note for more details.        FAMILY HISTORY   A three-generation pedigree was not obtained today due to our focus on other topics. The family history was reported by Lakeshia and their partner.    The following significant findings were reported today for Lakeshia's family:   Mother: 7 miscarriages with Lakeshia's stepfather  Limited paternal  history   Paternal uncle: had a a son with an eye difference (reported to potentially be related to mother with an STI)  We discussed how this finding could also potentially be related to a genetic cause. If the couple learns more, they could share with [facility].    The following significant findings were reported today for Jon's family:   Sister: had cancer as a teenager (type unspecified)  Paternal grandmother: 2-3 miscarriages  Paternal cousin: 2nd trimester pregnancy loss of twins (one twin was reported to have been growing outside of the gestational sac)  Maternal family: history of cancer    Otherwise, the reported family history is unremarkable for other birth defects, intellectual disabilities, developmental delays, autism, known genetic conditions, and consanguinity.     Cancer  We discussed how most cancer seen in families occurs sporadically, but about 5-10% may be due to an underlying genetic etiology. We discussed that cancer diagnosed under the age of 50 raises suspicion for a potential hereditary cancer syndrome. Other characteristics that may suggest a hereditary cancer syndrome include cancer in 2 or more close relatives on the same side of the family, multiple primary tumors, bilateral or multiple rare cancers, constellations of tumors associated with a specific cancer syndrome, and evidence of autosomal dominant transmission. Jon is encouraged to share cancer family history with their health care providers. Even if no hereditary cancer syndrome is identified in a family, individuals may be eligibile for increased screening or risk management options given a family history. There is also cancer genetic counseling. If a family wants more information they can contact the [facility] Cancer Risk Management Program ([phone]). Physicians can also make referrals at [email or web address]-[email or web address] or, if within the [facility] system, through [facility] referral for  "\"Cancer Risk Mgmt/Cancer Genetic Counseling\"     Recurrent Pregnancy Loss  Recurrent miscarriage is defined as three or more clinically recognized consecutive or non-consecutive pregnancy losses occurring prior to fetal viability (<24 weeks). We discussed that there are multiple potential causes for recurrent pregnancy loss, including genetic factors. For example, familial chromosome rearrangements can increase the risk for recurrent loss.        RISK ASSESSMENT FOR INHERITED CONDITIONS AND CARRIER SCREENING OPTIONS   Expanded carrier screening is available to screen for autosomal recessive conditions and X-linked conditions in a large list of genes. Carrier screening does not test the pregnancy but gives a risk assessment for the pregnancy and future pregnancies to have the condition. Expanded carrier screening is designed to identify carrier status for conditions that are primarily childhood or adolescent onset. Expanded carrier screening does not evaluate for adult-onset conditions such as hereditary cancer syndromes, dementia/Alzheimer's disease, or cardiovascular disease risk factors. Additionally, expanded carrier screening is not comprehensive for all known genetic diseases or inherited conditions. Carrier screening does not test for all genetic and health conditions or risk factors.     Autosomal recessive conditions happen when a mutation has been inherited from the egg and sperm and include conditions like cystic fibrosis, thalassemia, hearing loss, spinal muscular atrophy, and more. We reviewed that when both biological parents carry a harmful genetic change in a gene associated with autosomal recessive inheritance, each of their pregnancies has a 1 in 4 (25%) chance to be affected by that condition. X-linked conditions happen when a mutation has been inherited from the egg and include conditions like fragile X syndrome.With X-linked conditions, the specific risk generally depends on the chromosomal sex " of the fetus, with XY individuals (generally male) being most severely affected.     Firth screening was not reviewed due to focus on other topics.  About [place] Firth Screening    The patient does NOT have a family history of known inherited conditions. This does NOT mean the patient and/or their partner is not a carrier of a condition. Approximately 90% of couples at an increased reproductive risk for an inherited condition have no family history of that condition.     The patient has not had carrier screening previously.     We discussed that [facility] includes some inherited conditions (such as cystic fibrosis). We also reviewed that there are larger carrier screening tests.    Carrier screening was briefly mentioned today. Options for carrier screening can be further revisited in the future, if desired.    RISK ASSESSMENT FOR CHROMOSOME CONDITIONS   We explained that the risk for fetal chromosome abnormalities increases with maternal age. We discussed specific features of common chromosome abnormalities, including trisomy 21 (Down syndrome), trisomy 13, trisomy 18, and sex chromosome trisomies.    At age 24 at midtrimester, the risk to have a baby with Down syndrome is 1 in 1040.  At age 24 at midtrimester, the risk to have a baby with any chromosome abnormality is 1 in 520.     Lakeshia had genetic screening earlier in this pregnancy. Their non-invasive prenatal test was screen negative or low risk for screened conditions     Non-invasive prenatal testing (NIPT) results  Maternal plasma cell-free DNA testing  Screens for fetal trisomy 21, trisomy 13, trisomy 18, and sex chromosome aneuploidy  Lakeshia had a [facility] test earlier in pregnancy; we reviewed the results today, which are low risk.  The NIPT did include sex chromosome aneuploidies and the result was low risk. The predicted sex is XY, which is typically male.  Given the accuracy of this test, these results greatly decrease the chance for certain fetal  chromosome abnormalities  We discussed the limitations of normal NIPT results  Maternal serum AFP only to screen for open neural tube defects (after 15 weeks) was not performed in this pregnancy, to our knowledge.     GENETIC TESTING OPTIONS   Genetic testing during a pregnancy includes screening and diagnostic procedures.      Screening tests are non-invasive which means no risk to the pregnancy and includes ultrasounds and blood work. The benefits and limitations of screening were reviewed. Screening tests provide a risk assessment (chance) specific to the pregnancy for certain fetal chromosome abnormalities but cannot definitively diagnose or exclude a fetal chromosome abnormality. Follow-up genetic counseling and consideration of diagnostic testing is recommended with any abnormal screening result. Diagnostic testing during a pregnancy is more certain and can test for more conditions. However, the tests do have a risk of miscarriage that requires careful consideration. These tests can detect fetal chromosome abnormalities with greater than 99% certainty. Results can be compromised by maternal cell contamination or mosaicism and are limited by the resolution of current genetic testing technology.     There is no screening or diagnostic test that detects all forms of birth defects or intellectual disability.     We discussed the following screening options:   Non-invasive prenatal testing (NIPT)  Also called cell-free DNA screening because it detects chromosomes from the placenta in the pregnant person's blood  Can be done any time after 10 weeks gestation  Standard recommendation for NIPT screens for trisomy 21, trisomy 18, trisomy 13, with the option of adding sex chromosome aneuploidies, without or without predicted sex  Cannot screen for open neural tube defects, maternal serum AFP after 15 weeks is recommended  New NIPT options include screening for other trisomies, microdeletion syndromes, and in some cases  fetal blood antigens. Guidelines do not recommend these conditions are included in standard screening. However, current (2023) ACMG guidelines do recommend that screening for one microdeletion syndrome, called 22q11.2 deletion syndrome be offered to all pregnant patients. These options have limitations and should be discussed with a genetic counselor.    [facility] uses the same technology as standard NIPT to assess risks for genetic conditions affecting pregnancy. The testing uses massive parallel sequencing to assess placental DNA fragments and DNA fragments from the pregnant person, and based on the quantification of regions of interest can provide risk assessments for whole chromosome abnormalities and a handful of autosomal recessive conditions. The [facility] test first performs routine carrier screening for cystic fibrosis, spinal muscular atrophy and hemoglobinopathies (HBB, HBA1, HBA2) to determine carrier status of the pregnant person through sequencing. If the pregnant person is determined to be a carrier, the testing then reflexes to look for additional variants or additional changes in copy numbers, detected at low levels that would be representative of the cell-free DNA from the pregnancy. It can also assess fetal antigen status for RhD (in non-alloimmunized individuals), C, c, Cevallos, E, or Michelle. Fetal antigen typing through this method is reported to have a sensitivity and specificity greater than 99.9%.     The benefits of [facility] is that the other biological parent of the pregnancy's sample is not needed and the test is non-invasive. The testing skips traditional carrier screening and looks for the presence of the disease in the pregnancy. This is NOT an alternative to diagnostic testing such as an amniocentesis. Since [facility] is a screen, there are false positives and false negatives.     We discussed the following ultrasound options:  Comprehensive level II ultrasound (Fetal Anatomy Ultrasound)  Ultrasound done  between 18-20 weeks gestation  Screens for major birth defects and markers for aneuploidy (like trisomy 21 and trisomy 18)  Includes looking at the fetus/baby's growth, heart, organs (stomach, kidneys), placenta, and amniotic fluid    We discussed the following diagnostic options:   Amniocentesis  Invasive diagnostic procedure done after 15 weeks gestation  The procedure collects a small sample of amniotic fluid for the purpose of chromosomal testing and/or other genetic testing  Diagnostic result; more than 99% sensitivity for fetal chromosome abnormalities  Testing for AFP in the amniotic fluid can test for open neural tube defects    It was a pleasure to be involved with Lakeshia s care. Face-to-face time of the meeting was  50  minutes.    Felipe Feliciano MS, [facility]  Board Certified and [place] Licensed Genetic Counselor  [facility]  Maternal Fetal Medicine  Office: [phone]  [facility]: [phone]   Fax: [phone]  [facility]

## 2024-05-17 ENCOUNTER — PRENATAL OFFICE VISIT (OUTPATIENT)
Dept: FAMILY MEDICINE | Facility: CLINIC | Age: 24
End: 2024-05-17
Payer: MEDICAID

## 2024-05-17 VITALS
HEART RATE: 93 BPM | BODY MASS INDEX: 31.96 KG/M2 | RESPIRATION RATE: 18 BRPM | TEMPERATURE: 97.6 F | OXYGEN SATURATION: 99 % | SYSTOLIC BLOOD PRESSURE: 120 MMHG | DIASTOLIC BLOOD PRESSURE: 70 MMHG | WEIGHT: 187.2 LBS | HEIGHT: 64 IN

## 2024-05-17 DIAGNOSIS — O09.90 SUPERVISION OF HIGH RISK PREGNANCY, ANTEPARTUM: Primary | ICD-10-CM

## 2024-05-17 DIAGNOSIS — O36.1920 MATERNAL ATYPICAL ANTIBODY AFFECTING PREGNANCY IN SECOND TRIMESTER, SINGLE OR UNSPECIFIED FETUS: ICD-10-CM

## 2024-05-17 LAB
ANTIBODY ID: NORMAL
ANTIBODY SCREEN: POSITIVE
SPECIMEN EXPIRATION DATE: ABNORMAL
SPECIMEN EXPIRATION DATE: NORMAL
SPECIMEN EXPIRATION DATE: NORMAL
XXX BLOOD GROUP AB TITR SERPL: <1 {TITER}

## 2024-05-17 PROCEDURE — 99207 PR COMPLICATED OB VISIT: CPT | Performed by: STUDENT IN AN ORGANIZED HEALTH CARE EDUCATION/TRAINING PROGRAM

## 2024-05-17 PROCEDURE — 36415 COLL VENOUS BLD VENIPUNCTURE: CPT | Performed by: STUDENT IN AN ORGANIZED HEALTH CARE EDUCATION/TRAINING PROGRAM

## 2024-05-17 PROCEDURE — 86886 COOMBS TEST INDIRECT TITER: CPT | Performed by: STUDENT IN AN ORGANIZED HEALTH CARE EDUCATION/TRAINING PROGRAM

## 2024-05-17 PROCEDURE — 86850 RBC ANTIBODY SCREEN: CPT | Performed by: STUDENT IN AN ORGANIZED HEALTH CARE EDUCATION/TRAINING PROGRAM

## 2024-05-17 PROCEDURE — 86870 RBC ANTIBODY IDENTIFICATION: CPT | Performed by: STUDENT IN AN ORGANIZED HEALTH CARE EDUCATION/TRAINING PROGRAM

## 2024-05-17 ASSESSMENT — PAIN SCALES - GENERAL: PAINLEVEL: NO PAIN (0)

## 2024-05-17 NOTE — PROGRESS NOTES
"Lakeshia Phillips 24 year old  @ 20w4d with an Estimated Date of Delivery: 2024, by Last Menstrual Period here for OB visit.  No bleeding or LOF.   [facility] visit/assessment, their US, and their recs reviewed    Prenatal Labs:   Lab Results   Component Value Date    AS Positive (A) 2024    HEPBANG Nonreactive 2024    CHPCRT Negative 2024    GCPCRT Negative 2024    HGB 10.8 (L) 2024      /70   Pulse 93   Temp 97.6  F (36.4  C) (Temporal)   Resp 18   Ht 1.626 m (5' 4.02\")   Wt 84.9 kg (187 lb 3.2 oz)   LMP 2023   SpO2 99%   BMI 32.12 kg/m     Gen - well appearing  See flowsheet      A/P   Doing well.  No concerns today.  Antibody labs to be completed monthly per [facility] recs.  In discussion with patient, appears these are ordered through our office, ordering today as last check was . Will advise if needs to return to [facility] based on results.   Will otherwise plan standard care unless otherwise directed by [facility].  Scheduled for growth US on  (28.1 wga) with [facility].   Discussed PTL, PROM, and when to call or come in.  Reportable signs and symptoms discussed and clarified as per [facility] recommendations.  RTC 1 mo    Pregnancy Issues   1. Non-ABO abnormal Maternal Antibody Positive (Anti-c): Has seen MFM, per their recs Check anti-c titer qmonthly, if >8 referral back to [facility] for fetal monitoring via MCA Dopplers q 1 -2 weeks for fetal anemia. Recommended assessing paternal RBC antigen status, patient deferred. Planned Growth US at 28 weeks with [facility], if no concerns return to routine PNC.   2. Prior vaginal bleeding/threatened miscarriage/remnant alexei-cervical clot: potentially increased risk for PPROM and  delivery, encouraged to seek care if bright red bleeding or concerns for ROM.       Prenatal care plan              - prior deliveries: one vaginal  - OB Labs of concern: Anti-c antibody   - Rh- A POS              - First trimester screening:  NIPS nl              - " Labor pain management:  thinking yes at point              - Ped:  Discuss at later visit.               - Circumcision if boy: Yes, but would like in clinic due to cost              - Birth control: yes, probably IUD and would go for mid level dose as plans for pregnancy in few years              - Breast feeding:  Yes              - Covid 19 vaccine:  Not Done              - influenza vaccine: Not Done              - Tdap: at 28 weeks

## 2024-05-28 ENCOUNTER — HOSPITAL ENCOUNTER (EMERGENCY)
Facility: CLINIC | Age: 24
End: 2024-05-28

## 2024-06-10 ENCOUNTER — MYC MEDICAL ADVICE (OUTPATIENT)
Dept: FAMILY MEDICINE | Facility: CLINIC | Age: 24
End: 2024-06-10
Payer: MEDICAID

## 2024-06-17 ENCOUNTER — PRENATAL OFFICE VISIT (OUTPATIENT)
Dept: FAMILY MEDICINE | Facility: CLINIC | Age: 24
End: 2024-06-17
Payer: MEDICAID

## 2024-06-17 VITALS
WEIGHT: 189 LBS | OXYGEN SATURATION: 98 % | HEART RATE: 98 BPM | TEMPERATURE: 97.5 F | HEIGHT: 64 IN | DIASTOLIC BLOOD PRESSURE: 66 MMHG | SYSTOLIC BLOOD PRESSURE: 120 MMHG | BODY MASS INDEX: 32.27 KG/M2

## 2024-06-17 DIAGNOSIS — O36.1920 MATERNAL ATYPICAL ANTIBODY AFFECTING PREGNANCY IN SECOND TRIMESTER, SINGLE OR UNSPECIFIED FETUS: ICD-10-CM

## 2024-06-17 DIAGNOSIS — O09.92 HIGH-RISK PREGNANCY, SECOND TRIMESTER: Primary | ICD-10-CM

## 2024-06-17 LAB
ANTIBODY SCREEN: POSITIVE
SPECIMEN EXPIRATION DATE: ABNORMAL

## 2024-06-17 PROCEDURE — 99213 OFFICE O/P EST LOW 20 MIN: CPT | Mod: 25 | Performed by: STUDENT IN AN ORGANIZED HEALTH CARE EDUCATION/TRAINING PROGRAM

## 2024-06-17 PROCEDURE — 86886 COOMBS TEST INDIRECT TITER: CPT | Performed by: STUDENT IN AN ORGANIZED HEALTH CARE EDUCATION/TRAINING PROGRAM

## 2024-06-17 PROCEDURE — 86870 RBC ANTIBODY IDENTIFICATION: CPT | Performed by: STUDENT IN AN ORGANIZED HEALTH CARE EDUCATION/TRAINING PROGRAM

## 2024-06-17 PROCEDURE — 86850 RBC ANTIBODY SCREEN: CPT | Performed by: STUDENT IN AN ORGANIZED HEALTH CARE EDUCATION/TRAINING PROGRAM

## 2024-06-17 PROCEDURE — 99207 PR COMPLICATED OB VISIT: CPT | Performed by: STUDENT IN AN ORGANIZED HEALTH CARE EDUCATION/TRAINING PROGRAM

## 2024-06-17 PROCEDURE — 36415 COLL VENOUS BLD VENIPUNCTURE: CPT | Performed by: STUDENT IN AN ORGANIZED HEALTH CARE EDUCATION/TRAINING PROGRAM

## 2024-06-17 ASSESSMENT — PAIN SCALES - GENERAL: PAINLEVEL: NO PAIN (0)

## 2024-06-17 NOTE — PROGRESS NOTES
"Lakeshia Phillips 24 year old  @ 25w0d with an Estimated Date of Delivery: 2024, by Last Menstrual Period here for OB visit.  No bleeding or LOF.  No concerns.   Will repeat anti-c titer today for trending as rec by [facility] for abnormal non-abo antibody positive result.     Prenatal Labs:   Lab Results   Component Value Date    AS Positive (A) 2024    HEPBANG Nonreactive 2024    CHPCRT Negative 2024    GCPCRT Negative 2024    HGB 10.8 (L) 2024          /66   Pulse 98   Temp 97.5  F (36.4  C) (Temporal)   Ht 1.626 m (5' 4.02\")   Wt 85.7 kg (189 lb)   LMP 2023   SpO2 98%   BMI 32.43 kg/m     Gen - well appearing  See flowsheet      A/P   Doing well.  No concerns today.  Recommend GTT to screen for Gestational diabetes, prior to or during 28 weeks, also recommend recheck cbc/plts, ordering these labs today after collection for anti-C titer and can discuss getting prior to or at next visit.  Antibody labs to be completed monthly per [facility] recs.  No recurrence of vaginal bleeding.    titer negative (<1), rpt order today.   Will advise if needs to return to [facility] based on results.   Standard care unless otherwise directed by MFM.  Scheduled for growth US on  (28.1 wga) with [facility].   FH trending 1 cm further than dates, per pt MFM indicated baby on higher end of normal range. Will monitor weight gain closely, advised patient to check periodically at home.   Discussed PTL, PROM, and when to call or come in.  Reportable signs and symptoms discussed and clarified as per [facility] recommendations.  RTC 1 mo    Pregnancy Issues              1. Non-ABO abnormal Maternal Antibody Positive (Anti-c): Has seen MFM, per their recs Check anti-c titer qmonthly, if >8 referral back to [facility] for fetal monitoring via MCA Dopplers q 1 -2 weeks for fetal anemia. Recommended assessing paternal RBC antigen status, patient deferred. Planned Growth US at 28 weeks with [facility], if no concerns return " to routine PNC.   2. Prior vaginal bleeding/threatened miscarriage/remnant alexei-cervical clot: potentially increased risk for PPROM and  delivery, encouraged to seek care if bright red bleeding or concerns for ROM.                   Prenatal care plan              - prior deliveries: one vaginal  - OB Labs of concern: Anti-c antibody positive              - Rh- A POS              - First trimester screening:  NIPS nl              - Labor pain management:  thinking yes at point              - Ped:  Discuss at later visit.               - Circumcision if boy: Yes, but would like in clinic due to cost              - Birth control: yes, probably IUD and would go for mid level dose as plans for pregnancy in few years              - Breast feeding:  Yes              - Covid 19 vaccine:  Not Done              - influenza vaccine: Not Done              - Tdap: at 28 weeks      Order and Review of the result(s) of each unique test - Anti-C titers pending. (MDM stable issue with order and review of 1 lab test = 25 mod plus Level 3)

## 2024-06-17 NOTE — PROGRESS NOTES
Concerns: ***  {[id number]}  Discussed PTL, PROM, and when to call or come in.  Normal anatomy ultrasound.  RTC 4 weeks.  GTT and labs today       Tena López, DO

## 2024-06-18 LAB
ANTIBODY ID: NORMAL
ANTIBODY UNIDENTIFIED: NORMAL
SPECIMEN EXPIRATION DATE: NORMAL
XXX BLOOD GROUP AB TITR SERPL: <1 {TITER}

## 2024-06-19 DIAGNOSIS — O09.90 SUPERVISION OF HIGH RISK PREGNANCY, ANTEPARTUM: Primary | ICD-10-CM

## 2024-07-09 ENCOUNTER — OFFICE VISIT (OUTPATIENT)
Dept: MATERNAL FETAL MEDICINE | Facility: CLINIC | Age: 24
End: 2024-07-09
Attending: OBSTETRICS & GYNECOLOGY
Payer: MEDICAID

## 2024-07-09 ENCOUNTER — HOSPITAL ENCOUNTER (OUTPATIENT)
Dept: ULTRASOUND IMAGING | Facility: CLINIC | Age: 24
Discharge: HOME | End: 2024-07-09
Attending: OBSTETRICS & GYNECOLOGY
Payer: MEDICAID

## 2024-07-09 DIAGNOSIS — O36.1990 MATERNAL ATYPICAL ANTIBODY AFFECTING PREGNANCY, ANTEPARTUM, SINGLE OR UNSPECIFIED FETUS: ICD-10-CM

## 2024-07-09 DIAGNOSIS — Z36.2 ENCOUNTER FOR FOLLOW-UP ULTRASOUND OF FETAL ANATOMY: ICD-10-CM

## 2024-07-09 DIAGNOSIS — O36.1930 MATERNAL ATYPICAL ANTIBODY AFFECTING PREGNANCY IN THIRD TRIMESTER, SINGLE OR UNSPECIFIED FETUS: Primary | ICD-10-CM

## 2024-07-09 PROCEDURE — 76816 OB US FOLLOW-UP PER FETUS: CPT | Mod: 26 | Performed by: OBSTETRICS & GYNECOLOGY

## 2024-07-09 PROCEDURE — 76816 OB US FOLLOW-UP PER FETUS: CPT

## 2024-07-09 NOTE — PROGRESS NOTES
Please refer to ultrasound report under 'Imaging' Studies of 'Chart Review' tabs.    Mehrdad Stephenson M.D.

## 2024-07-09 NOTE — NURSING NOTE
Patient reports positive fetal movement, denies pain, denies contractions, leaking of fluid, or bleeding.  Education provided to patient on today's ultrasound.  SBAR given to [facility] ARREOLA, see their note in Epic.

## 2024-07-13 ENCOUNTER — HOSPITAL ENCOUNTER (OUTPATIENT)
Facility: CLINIC | Age: 24
End: 2024-07-13
Admitting: FAMILY MEDICINE

## 2024-07-13 ENCOUNTER — HOSPITAL ENCOUNTER (EMERGENCY)
Facility: CLINIC | Age: 24
End: 2024-07-13

## 2024-07-13 ENCOUNTER — HOSPITAL ENCOUNTER (OUTPATIENT)
Facility: CLINIC | Age: 24
Discharge: HOME | End: 2024-07-13
Attending: FAMILY MEDICINE | Admitting: FAMILY MEDICINE
Payer: MEDICAID

## 2024-07-13 ENCOUNTER — NURSE TRIAGE (OUTPATIENT)
Dept: NURSING | Facility: CLINIC | Age: 24
End: 2024-07-13
Payer: MEDICAID

## 2024-07-13 VITALS
TEMPERATURE: 98 F | RESPIRATION RATE: 16 BRPM | DIASTOLIC BLOOD PRESSURE: 58 MMHG | SYSTOLIC BLOOD PRESSURE: 113 MMHG | HEART RATE: 78 BPM

## 2024-07-13 PROBLEM — R10.2 VAGINAL PAIN: Status: ACTIVE | Noted: 2024-07-13

## 2024-07-13 LAB
ALBUMIN UR-MCNC: NEGATIVE MG/DL
AMORPH CRY #/AREA URNS HPF: ABNORMAL /HPF
APPEARANCE UR: ABNORMAL
BILIRUB UR QL STRIP: NEGATIVE
CLUE CELLS: NORMAL
COLOR UR AUTO: YELLOW
GLUCOSE UR STRIP-MCNC: NEGATIVE MG/DL
HGB UR QL STRIP: NEGATIVE
KETONES UR STRIP-MCNC: NEGATIVE MG/DL
LEUKOCYTE ESTERASE UR QL STRIP: NEGATIVE
MUCOUS THREADS #/AREA URNS LPF: PRESENT /LPF
NITRATE UR QL: NEGATIVE
PH UR STRIP: 6 [PH] (ref 5–7)
RBC URINE: 0 /HPF
SP GR UR STRIP: 1.01 (ref 1–1.03)
SQUAMOUS EPITHELIAL: 2 /HPF
TRICHOMONAS, WET PREP: NORMAL
UROBILINOGEN UR STRIP-MCNC: NORMAL MG/DL
WBC URINE: 0 /HPF
WBC'S/HIGH POWER FIELD, WET PREP: NORMAL
YEAST, WET PREP: NORMAL

## 2024-07-13 PROCEDURE — 81001 URINALYSIS AUTO W/SCOPE: CPT | Performed by: FAMILY MEDICINE

## 2024-07-13 PROCEDURE — 87210 SMEAR WET MOUNT SALINE/INK: CPT | Performed by: FAMILY MEDICINE

## 2024-07-13 PROCEDURE — G0463 HOSPITAL OUTPT CLINIC VISIT: HCPCS

## 2024-07-13 RX ORDER — PROCHLORPERAZINE 25 MG
25 SUPPOSITORY, RECTAL RECTAL EVERY 12 HOURS PRN
Status: DISCONTINUED | OUTPATIENT
Start: 2024-07-13 | End: 2024-07-13 | Stop reason: HOSPADM

## 2024-07-13 RX ORDER — ONDANSETRON 2 MG/ML
4 INJECTION INTRAMUSCULAR; INTRAVENOUS EVERY 6 HOURS PRN
Status: DISCONTINUED | OUTPATIENT
Start: 2024-07-13 | End: 2024-07-13 | Stop reason: HOSPADM

## 2024-07-13 RX ORDER — PROCHLORPERAZINE MALEATE 5 MG
10 TABLET ORAL EVERY 6 HOURS PRN
Status: DISCONTINUED | OUTPATIENT
Start: 2024-07-13 | End: 2024-07-13 | Stop reason: HOSPADM

## 2024-07-13 RX ORDER — ONDANSETRON 4 MG/1
4 TABLET, ORALLY DISINTEGRATING ORAL EVERY 6 HOURS PRN
Status: DISCONTINUED | OUTPATIENT
Start: 2024-07-13 | End: 2024-07-13 | Stop reason: HOSPADM

## 2024-07-13 RX ORDER — METOCLOPRAMIDE 5 MG/1
10 TABLET ORAL EVERY 6 HOURS PRN
Status: DISCONTINUED | OUTPATIENT
Start: 2024-07-13 | End: 2024-07-13 | Stop reason: HOSPADM

## 2024-07-13 RX ORDER — METOCLOPRAMIDE HYDROCHLORIDE 5 MG/ML
10 INJECTION INTRAMUSCULAR; INTRAVENOUS EVERY 6 HOURS PRN
Status: DISCONTINUED | OUTPATIENT
Start: 2024-07-13 | End: 2024-07-13 | Stop reason: HOSPADM

## 2024-07-13 ASSESSMENT — ACTIVITIES OF DAILY LIVING (ADL): ADLS_ACUITY_SCORE: 20

## 2024-07-13 NOTE — PROGRESS NOTES
S:  Discharge from triage.   A:  FHT's 150, Moderate variability, Accels present, no decels noted. Contractions not present.  Cervical exam not performed. Wet prep and UA negative. Pt had shaved labia and there was a cut on inside of labia that patient thinks is causing the pain. Suggested soaking in the tub and putting some Bacitracin on the cut.  R:  Written and verbal D/C instruction provided. Pt. Verbalized understanding of D/C instructions and will follow up with primary provider as previously scheduled.   Verbalizes understanding that she will call or return to [facility] with any further questions or concerns.   Pt. D/C'd via walking with SO.    Nursing Discharge Checklist  Discharge order entered: Yes  Patient care order entered: Yes  Charges entered: Yes  IV start and stop times have been documented in Epic? No  NST start and stop times have been documented in Epic Doc F/S? No

## 2024-07-13 NOTE — DISCHARGE INSTRUCTIONS
OB Triage Discharge Instructions    Diet:  Regular diet as tolerated    Activity:  As tolerated    Other Special Instructions: soak in tub, use Bacitracin to cut if desired.    Reason for being seen in L&D: labial pain, possible UTI    Treatment/procedures performed in L&D: lab work for infection    Call the [facility] at [phone] if you have:  5 or more contractions in one hour  Leaking of fluid from your vaginal area  Decreased fetal movement (follow kick count instructions)  Bleeding from your vaginal area  Swelling in your face, or increased swelling in your hands or legs  Headaches or vision problems such as blurring or seeing spots or starts  Nausea or vomiting lasting for more than 24 hours  An increase in weight (5lbs/week)  Epigastric pain (sometimes confused with heartburn that does not go away or a very bad stomach ache)    If you have any questions, please follow up with your doctor.        Patient Signature: ______________________________________________________________  By signing the above I acknowledge that a nurse or my care provider has explained the instruction to me and I have had any questions regarding my care explained to me.        Discharge Nurse Signature: _______________________________ 7/13/2024  6:15 PM    Method of discharge: walking    Accompanied by: SO  Time of discharge: 1820

## 2024-07-13 NOTE — TELEPHONE ENCOUNTER
"Uti 28 weeks pregnant, cloudy   Frequency  Itchy  Yesterday lower back pain  Discharge is foamy white, usually clear    OB Triage Call    Is patient's OB/Midwife with the [facility] or [facility]? [facility]- Proceed with triage     Reason for call: UTI symptoms    Assessment: Patient having cloudy urine. Urinary frequency. Denies pain with urination just feels like she has to go a lot. Mostly only a few drops. Vaginal discharge is usually clear, but now foamy white with a tint of yellow. Vaginal itching.     Plan: Urgent care.    Patient's primary OB Provider is Tena López.      Per protocol recommendations Patient to schedule follow up appointment within 24 hours.    Is patient's delivering hospital on divert? Does not apply due to patient directed to urgent care.   Patient states the ED is closer than any urgent care so she will go there.       28w5d    Estimated Date of Delivery: Sep 30, 2024        OB History    Para Term  AB Living   2 1 1 0 0 1   SAB IAB Ectopic Multiple Live Births   0 0 0 0 0      # Outcome Date GA Lbr Jose/2nd Weight Sex Type Anes PTL Lv   2 Current            1 Term 18 39w0d  2.438 kg (5 lb 6 oz) F Vag-Spont   NAMITA      Name: Zeinab      Obstetric Comments   EDC 2024 based on LMP.  Parenting with Jon.  This will be their first delivery at [facility]       No results found for: \"GBS\"       Dio Guerra RN   Reason for Disposition   Unusual vaginal discharge (e.g., bad smelling, yellow, green, or foamy-white)    Additional Information   Negative: Shock suspected (e.g., cold/pale/clammy skin, too weak to stand, low BP, rapid pulse)   Negative: Sounds like a life-threatening emergency to the triager   Negative: [1] Unable to urinate (or only a few drops) > 4 hours AND [2] bladder feels very full (e.g., palpable bladder or strong urge to urinate)   Negative: [1] Pregnant 23 or more weeks AND [2] baby is moving less today (e.g., kick count < 5 in 1 hour or < 10 " in 2 hours)   Negative: SEVERE pain with urination   Negative: Side (flank) or lower back pain present   Negative: Fever 100.4 F (38.0 C) or higher   Negative: Patient sounds very sick or weak to the triager   Negative: Blood in urine (red, pink, or tea-colored)   Negative: Diabetes mellitus or weak immune system (e.g., HIV positive, cancer chemo, splenectomy, organ transplant, chronic steroids)   Negative: Bedridden (e.g., chronic illness, recovering from surgery)   Negative: [1] Taking antibiotic > 24 hours for UTI AND [2] fever persists   Negative: [1] Taking antibiotic > 72 hours (3 days) for UTI AND [2] painful urination not improved    Protocols used: Pregnancy - Urination Pain-A-AH

## 2024-07-13 NOTE — PROGRESS NOTES
S: Triage Arrival  B: Lakeshia is a 24 y.o.  @ 28w 5d who presents with complaint of UTI symptoms.  A: EFM applied. FHT's150 with moderate variability, accels present, no decels noted on strip. Contractions not present.  R: Will notify MD to obtain further orders.   English

## 2024-07-15 ENCOUNTER — PRENATAL OFFICE VISIT (OUTPATIENT)
Dept: FAMILY MEDICINE | Facility: CLINIC | Age: 24
End: 2024-07-15
Payer: MEDICAID

## 2024-07-15 VITALS
OXYGEN SATURATION: 99 % | WEIGHT: 191.2 LBS | HEIGHT: 64 IN | TEMPERATURE: 98.4 F | RESPIRATION RATE: 18 BRPM | SYSTOLIC BLOOD PRESSURE: 110 MMHG | HEART RATE: 86 BPM | BODY MASS INDEX: 32.64 KG/M2 | DIASTOLIC BLOOD PRESSURE: 68 MMHG

## 2024-07-15 DIAGNOSIS — O09.90 SUPERVISION OF HIGH RISK PREGNANCY, ANTEPARTUM: Primary | ICD-10-CM

## 2024-07-15 DIAGNOSIS — O99.013 ANEMIA OF PREGNANCY IN THIRD TRIMESTER: ICD-10-CM

## 2024-07-15 DIAGNOSIS — O36.1920 MATERNAL ATYPICAL ANTIBODY AFFECTING PREGNANCY IN SECOND TRIMESTER, SINGLE OR UNSPECIFIED FETUS: ICD-10-CM

## 2024-07-15 DIAGNOSIS — O99.810 ABNORMAL MATERNAL GLUCOSE TOLERANCE, ANTEPARTUM: ICD-10-CM

## 2024-07-15 DIAGNOSIS — O99.213 MATERNAL OBESITY SYNDROME IN THIRD TRIMESTER: ICD-10-CM

## 2024-07-15 DIAGNOSIS — Z23 NEED FOR TETANUS, DIPHTHERIA, AND ACELLULAR PERTUSSIS (TDAP) VACCINE: ICD-10-CM

## 2024-07-15 LAB
ANTIBODY SCREEN: POSITIVE
BASOPHILS # BLD AUTO: 0 10E3/UL (ref 0–0.2)
BASOPHILS NFR BLD AUTO: 0 %
EOSINOPHIL # BLD AUTO: 0 10E3/UL (ref 0–0.7)
EOSINOPHIL NFR BLD AUTO: 0 %
ERYTHROCYTE [DISTWIDTH] IN BLOOD BY AUTOMATED COUNT: 13.2 % (ref 10–15)
GLUCOSE 1H P 50 G GLC PO SERPL-MCNC: 154 MG/DL (ref 70–129)
HCT VFR BLD AUTO: 31 % (ref 35–47)
HGB BLD-MCNC: 10.3 G/DL (ref 11.7–15.7)
IMM GRANULOCYTES # BLD: 0.1 10E3/UL
IMM GRANULOCYTES NFR BLD: 1 %
LYMPHOCYTES # BLD AUTO: 2.6 10E3/UL (ref 0.8–5.3)
LYMPHOCYTES NFR BLD AUTO: 25 %
MCH RBC QN AUTO: 29.6 PG (ref 26.5–33)
MCHC RBC AUTO-ENTMCNC: 33.2 G/DL (ref 31.5–36.5)
MCV RBC AUTO: 89 FL (ref 78–100)
MONOCYTES # BLD AUTO: 0.4 10E3/UL (ref 0–1.3)
MONOCYTES NFR BLD AUTO: 4 %
NEUTROPHILS # BLD AUTO: 7 10E3/UL (ref 1.6–8.3)
NEUTROPHILS NFR BLD AUTO: 69 %
NRBC # BLD AUTO: 0 10E3/UL
NRBC BLD AUTO-RTO: 0 /100
PLATELET # BLD AUTO: 314 10E3/UL (ref 150–450)
RBC # BLD AUTO: 3.48 10E6/UL (ref 3.8–5.2)
SPECIMEN EXPIRATION DATE: ABNORMAL
WBC # BLD AUTO: 10.2 10E3/UL (ref 4–11)

## 2024-07-15 PROCEDURE — 86870 RBC ANTIBODY IDENTIFICATION: CPT | Performed by: STUDENT IN AN ORGANIZED HEALTH CARE EDUCATION/TRAINING PROGRAM

## 2024-07-15 PROCEDURE — 99207 PR PRENATAL VISIT: CPT | Performed by: STUDENT IN AN ORGANIZED HEALTH CARE EDUCATION/TRAINING PROGRAM

## 2024-07-15 PROCEDURE — 82950 GLUCOSE TEST: CPT | Performed by: STUDENT IN AN ORGANIZED HEALTH CARE EDUCATION/TRAINING PROGRAM

## 2024-07-15 PROCEDURE — 86850 RBC ANTIBODY SCREEN: CPT | Performed by: STUDENT IN AN ORGANIZED HEALTH CARE EDUCATION/TRAINING PROGRAM

## 2024-07-15 PROCEDURE — 90471 IMMUNIZATION ADMIN: CPT | Performed by: STUDENT IN AN ORGANIZED HEALTH CARE EDUCATION/TRAINING PROGRAM

## 2024-07-15 PROCEDURE — 85025 COMPLETE CBC W/AUTO DIFF WBC: CPT | Performed by: STUDENT IN AN ORGANIZED HEALTH CARE EDUCATION/TRAINING PROGRAM

## 2024-07-15 PROCEDURE — 99213 OFFICE O/P EST LOW 20 MIN: CPT | Mod: 25 | Performed by: STUDENT IN AN ORGANIZED HEALTH CARE EDUCATION/TRAINING PROGRAM

## 2024-07-15 PROCEDURE — 36415 COLL VENOUS BLD VENIPUNCTURE: CPT | Performed by: STUDENT IN AN ORGANIZED HEALTH CARE EDUCATION/TRAINING PROGRAM

## 2024-07-15 PROCEDURE — 86886 COOMBS TEST INDIRECT TITER: CPT | Performed by: STUDENT IN AN ORGANIZED HEALTH CARE EDUCATION/TRAINING PROGRAM

## 2024-07-15 PROCEDURE — 90715 TDAP VACCINE 7 YRS/> IM: CPT | Performed by: STUDENT IN AN ORGANIZED HEALTH CARE EDUCATION/TRAINING PROGRAM

## 2024-07-15 RX ORDER — FERROUS SULFATE 325(65) MG
325 TABLET ORAL EVERY OTHER DAY
Qty: 30 TABLET | Refills: 3 | Status: SHIPPED | OUTPATIENT
Start: 2024-07-15

## 2024-07-15 ASSESSMENT — PAIN SCALES - GENERAL: PAINLEVEL: NO PAIN (0)

## 2024-07-15 NOTE — PROGRESS NOTES
"Lakeshia Phillips 24 year old  @ 29w0d with an Estimated date of Delivery of 2024, by Last Menstrual Period here for OB visit.  Baby active, no LOF, VB.  Tdap today?  Reviewed [facility] recommendations. Updating rec to 1:4 titer for referral.       Prenatal Labs:   Lab Results   Component Value Date    AS Positive (A) 2024    HEPBANG Nonreactive 2024    CHPCRT Negative 2024    GCPCRT Negative 2024    HGB 10.3 (L) 07/15/2024      /68   Pulse 86   Temp 98.4  F (36.9  C) (Temporal)   Resp 18   Ht 1.626 m (5' 4\")   Wt 86.7 kg (191 lb 3.2 oz)   LMP 2023   SpO2 99%   BMI 32.82 kg/m     Gen - well appearing  See flowsheet    (O09.90) Supervision of high risk pregnancy, antepartum  (primary encounter diagnosis)  (O36.1920) Maternal atypical antibody affecting pregnancy in second trimester, single or unspecified fetus  Plan: Antibody screen red cell    O99.213)Maternal obesity syndrome in third trimester   (O99.810) Abnormal maternal glucose tolerance, antepartum  Failed 1 hr GTT, weight up 35 lbs, beyond goal maximum.   Plan: Glucose Tolerance Gest Std 100 gm, 3 Hour         (Future)    (O99.013) Anemia of pregnancy in third trimester  Plan: ferrous sulfate (FEROSUL) 325 (65 Fe) MG tablet          (Z23) Need for tetanus, diphtheria, and acellular pertussis (Tdap) vaccine    Doing well.  No concerns today.  Reviewed MFM visit note, stable and no concerns, return to primary PNC.  Antibody labs to be completed monthly per continued MFM recs.  Prior titer negative (<1), rpt order today.   Current rec is for referral if titer >1:4.  Will advise if needs to return to [facility] once resulted.  Growth US on  (28.1 wga) with MFM was wnl and all anatomy views have been achieved and documented as normal at this time. Growth appropriate for gestation.    Already at 35 lbs gain this pregnancy. Will monitor weight gain closely, advised patient to check periodically at home.   Failed 1 hr GTT. " 3 hr study ordered and patient advised to schedule.   Anemia of pregnancy basd on low hgb. Recommend start POFe. Rx sent and advise to try every other day doseing with food and with orange juice to improve absorption.  Discussed PTL, PROM, and when to call or come in.  Reportable signs and symptoms discussed and clarified as per [facility] recommendations.  RTC 1 mo     Pregnancy Issues              1. Non-ABO abnormal Maternal Antibody Positive (Anti-c): Has seen [facility], per their recs Check anti-c titer qmonthly, if >8 referral back to [facility] for fetal monitoring via MCA Dopplers q 1 -2 weeks for fetal anemia. Recommended assessing paternal RBC antigen status, patient deferred. Planned Growth US at 28 weeks with [facility], if no concerns return to routine PNC.   2. Maternal obesity syndrome - BMI 32.82, up 35 lbs since pre-gestational. Will ctm. Recommend consider  testing starting no later than 37 weeks with weekly BPP's unless other indication to start sooner. Can also consider Growth US at 32 or 36 if trend continues.   3. Anemia of pregnancy third trimester - Recommend POFe every other day.   4. Abnormal maternal glucose - Failed 1 hr GTT, 3 hr ordered. Advised to schedule and complete within next week.   5. Prior vaginal bleeding/threatened miscarriage/remnant alexei-cervical clot: potentially increased risk for PPROM and  delivery, encouraged to seek care if bright red bleeding or concerns for ROM.                   Prenatal care plan              - prior deliveries: one vaginal  - OB Labs of concern: Anti-c antibody positive              - Rh- A POS              - First trimester screening:  NIPS nl              - Labor pain management:  thinking yes at point              - Ped:  Discuss at later visit.               - Circumcision if boy: Yes, but would like in clinic due to cost              - Birth control: yes, probably IUD and would go for mid level dose as plans for pregnancy in few years              -  Breast feeding:  Yes              - Covid 19 vaccine:  Not Done              - influenza vaccine: Not Done              - Tdap: at 28 weeks

## 2024-07-15 NOTE — PROGRESS NOTES
Concerns: ***  {[id number]}  Discussed kick counts and fetal movement.  Discussed PTL, PROM, and when to call or come in.  RTC 2 weeks.    Tena López, DO

## 2024-07-16 NOTE — PATIENT INSTRUCTIONS
Learning About Screening for Gestational Diabetes  What is gestational diabetes screening?     Screening for gestational diabetes is a way to look for high blood sugar during pregnancy. You drink some very sweet liquid. Then you have a blood test to see how your body uses sugar (glucose).  How is gestational diabetes screening done?  Screening for gestational diabetes may be done in a couple of ways.  Two-part screening.  Part one (glucose challenge test): A blood sample is taken after you drink a liquid that contains sugar (glucose). You don't need to stop eating or drinking before this test. If the test shows that you don't have a lot of sugar in your blood, you don't have gestational diabetes.  Part two (oral glucose tolerance test, or OGTT): If the first test shows a lot of sugar in your blood, then you may have an OGTT. You can't eat or drink for at least 8 hours before this test. A blood sample is taken, then you drink a sweet liquid. You have more blood tests after 1 to 3 hours. If the OGTT shows that you have a lot of sugar in your blood, you may have gestational diabetes.  One-part screening.  Sometimes doctors use the OGTT on its own. If the test shows that you don't have a lot of sugar in your blood, you don't have gestational diabetes. If you do have a lot of sugar in your blood, you may have the condition.  What are the risks of screening?  Your blood glucose level may drop very low toward the end of the test. If this happens, you may feel weak, hungry, and restless. Tell your doctor if you have these symptoms. The test usually will be stopped.  You may vomit after drinking the sweet liquid. If this happens, you may need to take the test at a later time.  Your doctor may do more glucose tests at other times during your pregnancy.  Follow-up care is a key part of your treatment and safety. Be sure to make and go to all appointments, and call your doctor if you are having problems. It's also a good idea  "to know your test results and keep a list of the medicines you take.  Where can you learn more?  Go to https://www.[employer].net/patiented  Enter A472 in the search box to learn more about \"Learning About Screening for Gestational Diabetes.\"  Current as of: 2023               Content Version: 14.0    6388-9790 [employer].   Care instructions adapted under license by your healthcare professional. If you have questions about a medical condition or this instruction, always ask your healthcare professional. [employer] disclaims any warranty or liability for your use of this information.      Weeks 26 to 30 of Your Pregnancy: Care Instructions  You're starting your last trimester. You'll probably feel your baby moving around more. Your back may ache as your body gets used to your baby's size and length. Take care of yourself, and pay attention to what your body needs.    Talk to your doctor about getting the Tdap shot. It will help protect your  against whooping cough (pertussis). Also ask your doctor about flu and COVID-19 shots if you haven't had them yet. If your blood type is Rh negative, you may be given a shot of Rh immune globulin (such as RhoGAM). It can help prevent problems for your baby.    You may have [place] Hogg-Cormier contractions. They are single or several strong contractions without a pattern. These are practice contractions but not the start of labor.  Be kind to yourself.     Take breaks when you're tired.  Change positions often. Don't sit for too long or stand for too long.  At work, rest during breaks if you can. If you don't get breaks, talk to your doctor about writing a letter to your employer to request them.  Avoid fumes, chemicals, and tobacco smoke.  Be sexual if you want to.     You may be interested in sex, or you may not. Everyone is different.  Sex is okay unless your doctor tells you not to.  Your belly can make it hard to find good positions " "for sex. Queets and explore.  Watch for signs of  labor.    These signs include:   Menstrual-like cramps. Or you may have pain or pressure in your pelvis that happens in a pattern.  About 6 or more contractions in an hour (even after rest and a glass of water).  A low, dull backache that doesn't go away when you change positions.  An increase or change in vaginal discharge.  Light vaginal bleeding or spotting.  Your water breaking.  Know what to do if you think you are having contractions.     Drink 1 or 2 glasses of water.  Lie down on your left side for at least an hour.  While on your side, feel the top of your belly to see if it's tight.  Write down your contractions for an hour. Time how long it is from the start of one contraction to the start of the next.  Call your doctor if you have regular contractions.  Follow-up care is a key part of your treatment and safety. Be sure to make and go to all appointments, and call your doctor if you are having problems. It's also a good idea to know your test results and keep a list of the medicines you take.  Where can you learn more?  Go to https://www.[employer].net/patiented  Enter S999 in the search box to learn more about \"Weeks 26 to 30 of Your Pregnancy: Care Instructions.\"  Current as of: July 10, 2023               Content Version: 14.0    5477-3023 [employer].   Care instructions adapted under license by your healthcare professional. If you have questions about a medical condition or this instruction, always ask your healthcare professional. [employer] disclaims any warranty or liability for your use of this information.      Counting Your Baby's Kicks: Care Instructions  Overview     Counting your baby's kicks is one way your doctor can tell that your baby is healthy. You will probably feel your baby move for the first time between 16 and 22 weeks. The movement may feel like flutters rather than kicks. Your baby may move " "more at certain times of the day. When you are active, you may notice less kicking than when you are resting. At your prenatal visits, your doctor will ask whether the baby is active.  In your last trimester, your doctor may ask you to count the number of times you feel your baby move.  Follow-up care is a key part of your treatment and safety. Be sure to make and go to all appointments, and call your doctor if you are having problems. It's also a good idea to know your test results and keep a list of the medicines you take.  How do you count fetal kicks?  A common method of checking your baby's movement is to note the length of time it takes to count 10 movements (such as kicks, flutters, or rolls).  Pick your baby's most active time of day to count. This may be any time from morning to evening.  If you don't feel 10 movements in an hour, have something to eat or drink and count for another hour. If you don't feel at least 10 movements in the 2-hour period, call your doctor.  Do not use an at-home Doppler heart monitor in place of counting fetal movements.  When should you call for help?   Call your doctor now or seek immediate medical care if:    You feel fewer than 10 movements in a 2-hour period.     You noticed that your baby has stopped moving or is moving less than normal.   Watch closely for changes in your health, and be sure to contact your doctor if you have any problems.  Where can you learn more?  Go to https://www.[employer].net/patiented  Enter U048 in the search box to learn more about \"Counting Your Baby's Kicks: Care Instructions.\"  Current as of: July 10, 2023               Content Version: 14.0    1936-3547 [employer].   Care instructions adapted under license by your healthcare professional. If you have questions about a medical condition or this instruction, always ask your healthcare professional. [employer] disclaims any warranty or liability for your use of this " information.

## 2024-07-23 LAB
ANTIBODY ID: NORMAL
SPECIMEN EXPIRATION DATE: NORMAL

## 2024-07-24 LAB
BLOOD BANK CHART COMMENT: NORMAL
SPECIMEN EXPIRATION DATE: NORMAL
SPECIMEN EXPIRATION DATE: NORMAL
XXX BLOOD GROUP AB TITR SERPL: <1 {TITER}

## 2024-07-25 ENCOUNTER — LAB (OUTPATIENT)
Dept: LAB | Facility: CLINIC | Age: 24
End: 2024-07-25
Payer: MEDICAID

## 2024-07-25 DIAGNOSIS — O99.810 ABNORMAL MATERNAL GLUCOSE TOLERANCE, ANTEPARTUM: ICD-10-CM

## 2024-07-25 LAB
GESTATIONAL GTT 1 HR POST DOSE: 170 MG/DL (ref 60–179)
GESTATIONAL GTT 2 HR POST DOSE: 137 MG/DL (ref 60–154)
GESTATIONAL GTT 3 HR POST DOSE: 125 MG/DL (ref 60–139)
GLUCOSE P FAST SERPL-MCNC: 81 MG/DL (ref 60–94)

## 2024-07-25 PROCEDURE — 36415 COLL VENOUS BLD VENIPUNCTURE: CPT

## 2024-07-25 PROCEDURE — 82951 GLUCOSE TOLERANCE TEST (GTT): CPT

## 2024-07-25 PROCEDURE — 82952 GTT-ADDED SAMPLES: CPT

## 2024-07-27 ENCOUNTER — MYC REFILL (OUTPATIENT)
Dept: FAMILY MEDICINE | Facility: CLINIC | Age: 24
End: 2024-07-27
Payer: MEDICAID

## 2024-07-27 DIAGNOSIS — Z34.81 NORMAL PREGNANCY IN MULTIGRAVIDA IN FIRST TRIMESTER: ICD-10-CM

## 2024-07-27 DIAGNOSIS — O99.013 ANEMIA OF PREGNANCY IN THIRD TRIMESTER: ICD-10-CM

## 2024-07-28 ENCOUNTER — MYC MEDICAL ADVICE (OUTPATIENT)
Dept: FAMILY MEDICINE | Facility: CLINIC | Age: 24
End: 2024-07-28
Payer: MEDICAID

## 2024-07-29 RX ORDER — FERROUS SULFATE 325(65) MG
325 TABLET ORAL EVERY OTHER DAY
Qty: 30 TABLET | Refills: 3 | OUTPATIENT
Start: 2024-07-29

## 2024-07-29 NOTE — TELEPHONE ENCOUNTER
Team: Please call patient and advise:  General recommendations are for no air travel or long distance travel after 36 weeks.   Prior to this, the main considerations are risk and the distance/method of travel.   If the patient is not traveling extremely far then this should be ok.  I would advise that sitting too long can increase risk of blood clots whether by plane or car. Thus, she should make certain to plan frequent breaks to get up and walk around.  If she is even remotely concerned I would advise against travel.     Tena López, DO

## 2024-07-29 NOTE — TELEPHONE ENCOUNTER
Last office visit 7/15/2024 with ROHAN López DO for supervision of high risk pregnancy.    Will forward message to PCP for review.    Latisha Luna RN

## 2024-08-19 ENCOUNTER — PRENATAL OFFICE VISIT (OUTPATIENT)
Dept: FAMILY MEDICINE | Facility: CLINIC | Age: 24
End: 2024-08-19
Payer: MEDICAID

## 2024-08-19 VITALS
BODY MASS INDEX: 32.78 KG/M2 | OXYGEN SATURATION: 95 % | WEIGHT: 192 LBS | TEMPERATURE: 97.7 F | DIASTOLIC BLOOD PRESSURE: 62 MMHG | HEART RATE: 89 BPM | HEIGHT: 64 IN | SYSTOLIC BLOOD PRESSURE: 112 MMHG

## 2024-08-19 DIAGNOSIS — O99.213 MATERNAL OBESITY SYNDROME IN THIRD TRIMESTER: ICD-10-CM

## 2024-08-19 DIAGNOSIS — O99.013 ANEMIA OF PREGNANCY IN THIRD TRIMESTER: ICD-10-CM

## 2024-08-19 DIAGNOSIS — O09.93 HIGH-RISK PREGNANCY, THIRD TRIMESTER: Primary | ICD-10-CM

## 2024-08-19 DIAGNOSIS — O36.1920 MATERNAL ATYPICAL ANTIBODY AFFECTING PREGNANCY IN SECOND TRIMESTER, SINGLE OR UNSPECIFIED FETUS: ICD-10-CM

## 2024-08-19 LAB
ANTIBODY SCREEN: POSITIVE
SPECIMEN EXPIRATION DATE: ABNORMAL
SPECIMEN EXPIRATION DATE: NORMAL
XXX BLOOD GROUP AB TITR SERPL: <1 {TITER}
XXX BLOOD GROUP AB TITR SERPL: <1 {TITER}

## 2024-08-19 PROCEDURE — 86870 RBC ANTIBODY IDENTIFICATION: CPT | Performed by: STUDENT IN AN ORGANIZED HEALTH CARE EDUCATION/TRAINING PROGRAM

## 2024-08-19 PROCEDURE — 36415 COLL VENOUS BLD VENIPUNCTURE: CPT | Performed by: STUDENT IN AN ORGANIZED HEALTH CARE EDUCATION/TRAINING PROGRAM

## 2024-08-19 PROCEDURE — 99207 PR COMPLICATED OB VISIT: CPT | Performed by: STUDENT IN AN ORGANIZED HEALTH CARE EDUCATION/TRAINING PROGRAM

## 2024-08-19 PROCEDURE — 86850 RBC ANTIBODY SCREEN: CPT | Performed by: STUDENT IN AN ORGANIZED HEALTH CARE EDUCATION/TRAINING PROGRAM

## 2024-08-19 PROCEDURE — 86886 COOMBS TEST INDIRECT TITER: CPT | Performed by: STUDENT IN AN ORGANIZED HEALTH CARE EDUCATION/TRAINING PROGRAM

## 2024-08-19 PROCEDURE — 86905 BLOOD TYPING RBC ANTIGENS: CPT | Performed by: STUDENT IN AN ORGANIZED HEALTH CARE EDUCATION/TRAINING PROGRAM

## 2024-08-19 ASSESSMENT — PAIN SCALES - GENERAL: PAINLEVEL: NO PAIN (0)

## 2024-08-19 NOTE — PROGRESS NOTES
"Lakeshia Phillips 24 year old  @ 34w0d with an Estimated Date of Delivery: 2024, by Last Menstrual Period here for OB visit.  No bleeding or LOF.   34 weeks pregnant  No concerns or symptoms reported  No cramping or Lewis Cormier contractions  Taking iron supplements  Given Weight gain greater than goal for pregnancy (over shot by 10) and maternal obesity discussed obtaining growth ultrasound at 36 week visit, patient in agreement.  Ordering next antibody titer for anti-c to trend as recommended per MFM.    Prenatal Labs:   Lab Results   Component Value Date    AS Positive (A) 07/15/2024    HEPBANG Nonreactive 2024    CHPCRT Negative 2024    GCPCRT Negative 2024    HGB 10.3 (L) 07/15/2024          /62   Pulse 89   Temp 97.7  F (36.5  C) (Temporal)   Ht 1.626 m (5' 4.02\")   Wt 87.1 kg (192 lb)   LMP 2023   SpO2 95%   BMI 32.94 kg/m     Gen - well appearing  See flowsheet    A/P  Doing well.  No concerns today.  Prior titer negative, rpt antibody titer ordered today and pending.  Current rec is for referral if titer >1:4.  Will advise if needs to return to [facility] once resulted.  Monitor weight gain closely, advised patient to check periodically at home.   Discussed adding growth US at 36 weeks given weight gain >goal for pregnancy. Patient agreeable. Orders placed. Plan to have this before her next visit to have results to discuss at that time, if possible.   May pursue weekly BPPs starting 37 weeks dependant on growth US results.   Passed 3hr GTT.  Cont PoFe for anemia of pregnancy.  Discussed PTL, PROM, and when to call or come in.  Reportable signs and symptoms discussed and clarified as per MFM recommendations.  RTC 1 mo     Pregnancy Issues              1. Non-ABO abnormal Maternal Antibody Positive (Anti-c): Has seen MFM, per their recs Check anti-c titer qmonthly, if >8 referral back to [facility] for fetal monitoring via MCA Dopplers q 1 -2 weeks for fetal anemia. " Recommended assessing paternal RBC antigen status, patient deferred. Planned Growth US at 28 weeks with MFM, if no concerns return to routine PNC.   2. Maternal obesity syndrome - BMI 32.82, > total goal pregnancy weight gain since pre-gestational. Will ctm. Recommend consider  testing starting no later than 37 weeks with weekly BPP's unless other indication to start sooner. Can also consider Growth US at 32 or 36 if trend continues.   3. Anemia of pregnancy third trimester - Recommend POFe every other day.   4. Abnormal maternal glucose - Failed 1 hr GTT, passed 3 hr.   5. Prior vaginal bleeding/threatened miscarriage/remnant alexei-cervical clot: potentially increased risk for PPROM and  delivery, encouraged to seek care if bright red bleeding or concerns for ROM.               Prenatal care plan              - prior deliveries: one vaginal  - OB Labs of concern: Anti-c antibody positive              - Rh- A POS              - First trimester screening:  NIPS nl              - Labor pain management:  thinking yes at point              - Ped:  Discuss at later visit.               - Circumcision if boy: Yes, but would like in clinic due to cost              - Birth control: yes, probably IUD and would go for mid level dose as plans for pregnancy in few years              - Breast feeding:  Yes              - Covid 19 vaccine:  Not Done              - influenza vaccine: Not Done              - Tdap: Done 7/15

## 2024-08-19 NOTE — PATIENT INSTRUCTIONS
Weeks 34 to 36 of Your Pregnancy: Care Instructions  Your belly has grown quite large. It's almost time to give birth! Your baby's lungs are almost ready to breathe air. The skull bones are firm enough to protect your baby's head. But they're soft enough to move down through the birth canal.    You might be wondering what to expect during labor. Because each birth is different, there's no way to know exactly what childbirth will be like for you. Talk to your doctor or midwife about any concerns you have.    You'll probably have a test for group B streptococcus (GBS). GBS is bacteria that can live in the vagina and rectum. GBS can make your baby sick after birth. If you test positive, you'll get antibiotics during labor.    Choose what type of pain relief you would like during labor.  You can choose from a few types, including medicine and non-medicine options. You may want to use several types of pain relief.     Know how medicines can help with pain during labor.  Some medicines lower anxiety and help with some of the pain. Others make your lower body numb so that you will feel less pain.     Tell your doctor about your pain medicine choice.  Do this before you start labor or very early in your labor. You may be able to change your mind during labor.     Learn about the stages of labor.    The first stage includes the early (latent) and active phases of labor. Contractions start in early labor. During active labor, contractions get stronger, last longer, and happen more often. And the cervix opens more rapidly.  The second stage starts when you're ready to push. During this stage, your baby is born.  During the third stage, you'll usually have a few more contractions to push out the placenta.   Follow-up care is a key part of your treatment and safety. Be sure to make and go to all appointments, and call your doctor if you are having problems. It's also a good idea to know your test results and keep a list of the  "medicines you take.  Where can you learn more?  Go to https://www.[employer].net/patiented  Enter B912 in the search box to learn more about \"Weeks 34 to 36 of Your Pregnancy: Care Instructions.\"  Current as of: July 10, 2023               Content Version: 14.0    9695-9279 [employer].   Care instructions adapted under license by your healthcare professional. If you have questions about a medical condition or this instruction, always ask your healthcare professional. [employer] disclaims any warranty or liability for your use of this information.      Group B Strep During Pregnancy: Care Instructions  Overview     Group B strep infection is caused by a type of bacteria. It's a different kind of bacteria than the kind that causes strep throat.  You may have this kind of bacteria in your body. Sometimes it may cause an infection, but most of the time it doesn't make you sick or cause symptoms. But if you pass the bacteria to your baby during the birth, it can cause serious health problems for your baby.  If you have this bacteria in your body, you will get antibiotics when you are in labor. Antibiotics help prevent problems for a  baby.  After birth, doctors will watch and may test your baby. If your baby tests positive for Group B strep, your baby will get antibiotics.  If you plan to breastfeed your baby, don't worry. It will be safe to breastfeed.  Follow-up care is a key part of your treatment and safety. Be sure to make and go to all appointments, and call your doctor if you are having problems. It's also a good idea to know your test results and keep a list of the medicines you take.  How can you care for yourself at home?  If your doctor has prescribed antibiotics, take them as directed. Do not stop taking them just because you feel better. You need to take the full course of antibiotics.  Tell your doctor if you are allergic to any antibiotic.  If you go into labor, or your " "water breaks, go to the hospital. Your doctor will give you antibiotics to help protect your baby from infection.  Tell the doctors and nurses if you have an allergy to penicillin.  Tell the doctors and nurses at the hospital that you tested positive for group B strep.  When should you call for help?   Call your doctor now or seek immediate medical care if:    You have symptoms of a urinary tract infection. These may include:  Pain or burning when you urinate.  A frequent need to urinate without being able to pass much urine.  Pain in the flank, which is just below the rib cage and above the waist on either side of the back.  Blood in your urine.  A fever.     You think you are in labor or your water has broken.     You have pain in your belly or pelvis.   Watch closely for changes in your health, and be sure to contact your doctor if you have any problems.  Where can you learn more?  Go to https://www.[employer]/patiented  Enter M001 in the search box to learn more about \"Group B Strep During Pregnancy: Care Instructions.\"  Current as of: June 12, 2023               Content Version: 14.0    6070-0103 [employer].   Care instructions adapted under license by your healthcare professional. If you have questions about a medical condition or this instruction, always ask your healthcare professional. [employer] disclaims any warranty or liability for your use of this information.      Circumcision in Infants: What to Expect at Home  Your Child's Recovery  After circumcision, your baby's penis may look red and swollen. It may have petroleum jelly and gauze on it. The gauze will likely come off when your baby urinates. Follow your doctor's directions about whether to put clean gauze back on your baby's penis or to leave the gauze off. If you need to remove gauze from the penis, use warm water to soak the gauze and gently loosen it.  The doctor may have used a Plastibell device to do the " circumcision. If so, your baby will have a plastic ring around the head of the penis. The ring should fall off by itself in 10 to 12 days.  A thin, yellow film may form over the area the day after the procedure. This is part of the normal healing process. It should go away in a few days.  Your baby may seem fussy while the area heals. It may hurt for your baby to urinate. This pain often gets better in 3 or 4 days. But it may last for up to 2 weeks.  Even though your baby's penis will likely start to feel better after 3 or 4 days, it may look worse. The penis often starts to look like it's getting better after about 7 to 10 days.  This care sheet gives you a general idea about how long it will take for your child to recover. But each child recovers at a different pace. Follow the steps below to help your child get better as quickly as possible.  How can you care for your child at home?  Activity    Let your baby rest as much as possible. Sleeping will help with recovery.     You can give your baby a sponge bath the day after surgery. Ask your doctor when it is okay to give your baby a bath.   Medicines    Your doctor will tell you if and when your child can restart any medicines. The doctor will also give you instructions about your child taking any new medicines.     Your doctor may recommend giving your baby acetaminophen (Tylenol) to help with pain after the procedure. Be safe with medicines. Give your child medicines exactly as prescribed. Call your doctor if you think your child is having a problem with a medicine.     Do not give your child two or more pain medicines at the same time unless the doctor told you to. Many pain medicines have acetaminophen, which is Tylenol. Too much acetaminophen (Tylenol) can be harmful.   Circumcision care    Always wash your hands before and after touching the circumcision area.     Gently wash your baby's penis with plain, warm water after each diaper change, and pat it dry.  "Do not use soap. Don't use hydrogen peroxide or alcohol. They can slow healing.     Do not try to remove the film that forms on the penis. The film will go away on its own.     Put plenty of petroleum jelly (such as Vaseline) on the circumcision area during each diaper change. This will prevent your baby's penis from sticking to the diaper while it heals.     Fasten your baby's diapers loosely so that there is less pressure on the penis while it heals.   Follow-up care is a key part of your child's treatment and safety. Be sure to make and go to all appointments, and call your doctor if your child is having problems. It's also a good idea to know your child's test results and keep a list of the medicines your child takes.  When should you call for help?   Call your doctor now or seek immediate medical care if:    Your baby has a fever over 100.4 F.     Your baby is extremely fussy or irritable, has a high-pitched cry, or refuses to eat.     Your baby does not have a wet diaper within 12 hours after the circumcision.     You find a spot of bleeding larger than a 2-inch [place] from the incision.     Your baby has signs of infection. Signs may include severe swelling; redness; a red streak on the shaft of the penis; or a thick, yellow discharge.   Watch closely for changes in your child's health, and be sure to contact your doctor if:    A Plastibell device was used for the circumcision and the ring has not fallen off after 10 to 12 days.   Where can you learn more?  Go to https://www.[employer].net/patiented  Enter S255 in the search box to learn more about \"Circumcision in Infants: What to Expect at Home.\"  Current as of: October 24, 2023               Content Version: 14.0    3845-8282 [employer].   Care instructions adapted under license by your healthcare professional. If you have questions about a medical condition or this instruction, always ask your healthcare professional. [employer] disclaims any warranty or liability for your use of this information.

## 2024-08-20 LAB
ANTIBODY ID: NORMAL
JK SUP(A) AG RBC QL: NEGATIVE
SPECIMEN EXPIRATION DATE: NORMAL
SPECIMEN EXPIRATION DATE: NORMAL

## 2024-09-03 ENCOUNTER — HOSPITAL ENCOUNTER (OUTPATIENT)
Dept: ULTRASOUND IMAGING | Facility: CLINIC | Age: 24
Discharge: HOME | End: 2024-09-03
Attending: STUDENT IN AN ORGANIZED HEALTH CARE EDUCATION/TRAINING PROGRAM | Admitting: STUDENT IN AN ORGANIZED HEALTH CARE EDUCATION/TRAINING PROGRAM
Payer: MEDICAID

## 2024-09-03 DIAGNOSIS — O09.93 HIGH-RISK PREGNANCY, THIRD TRIMESTER: ICD-10-CM

## 2024-09-03 DIAGNOSIS — O99.213 MATERNAL OBESITY SYNDROME IN THIRD TRIMESTER: ICD-10-CM

## 2024-09-03 PROCEDURE — 76816 OB US FOLLOW-UP PER FETUS: CPT

## 2024-09-04 ENCOUNTER — PRENATAL OFFICE VISIT (OUTPATIENT)
Dept: FAMILY MEDICINE | Facility: CLINIC | Age: 24
End: 2024-09-04
Payer: MEDICAID

## 2024-09-04 VITALS
HEIGHT: 64 IN | SYSTOLIC BLOOD PRESSURE: 108 MMHG | HEART RATE: 100 BPM | BODY MASS INDEX: 33.63 KG/M2 | DIASTOLIC BLOOD PRESSURE: 66 MMHG | TEMPERATURE: 97.1 F | WEIGHT: 197 LBS | OXYGEN SATURATION: 98 %

## 2024-09-04 DIAGNOSIS — O99.619 GASTROESOPHAGEAL REFLUX IN PREGNANCY: ICD-10-CM

## 2024-09-04 DIAGNOSIS — O36.1920 MATERNAL ATYPICAL ANTIBODY AFFECTING PREGNANCY IN SECOND TRIMESTER, SINGLE OR UNSPECIFIED FETUS: ICD-10-CM

## 2024-09-04 DIAGNOSIS — O99.013 ANEMIA OF PREGNANCY IN THIRD TRIMESTER: ICD-10-CM

## 2024-09-04 DIAGNOSIS — O09.93 HIGH-RISK PREGNANCY, THIRD TRIMESTER: Primary | ICD-10-CM

## 2024-09-04 DIAGNOSIS — O99.213 MATERNAL OBESITY SYNDROME IN THIRD TRIMESTER: ICD-10-CM

## 2024-09-04 DIAGNOSIS — K21.9 GASTROESOPHAGEAL REFLUX IN PREGNANCY: ICD-10-CM

## 2024-09-04 PROCEDURE — 87653 STREP B DNA AMP PROBE: CPT | Performed by: STUDENT IN AN ORGANIZED HEALTH CARE EDUCATION/TRAINING PROGRAM

## 2024-09-04 PROCEDURE — 99207 PR COMPLICATED OB VISIT: CPT | Performed by: STUDENT IN AN ORGANIZED HEALTH CARE EDUCATION/TRAINING PROGRAM

## 2024-09-04 PROCEDURE — 99213 OFFICE O/P EST LOW 20 MIN: CPT | Mod: 25 | Performed by: STUDENT IN AN ORGANIZED HEALTH CARE EDUCATION/TRAINING PROGRAM

## 2024-09-04 RX ORDER — PANTOPRAZOLE SODIUM 20 MG/1
20 TABLET, DELAYED RELEASE ORAL DAILY
Qty: 30 TABLET | Refills: 1 | Status: ON HOLD | OUTPATIENT
Start: 2024-09-04 | End: 2024-09-24

## 2024-09-04 ASSESSMENT — PAIN SCALES - GENERAL: PAINLEVEL: NO PAIN (0)

## 2024-09-04 NOTE — PROGRESS NOTES
Doing well.    Complaining of reflux, hadn't mentioned before. Has tried dieat, positioning, timing of eating in relation to bedtime. Desires to try medication. Sending pantoprazole today.   Cervix is closed/thick/hick/int/mid   Cephalic position confirmed by Leopold maneuvers and ultrasound (Growth) prior day.  GBS done today.  Prior titer negative, rpt antibody titer ordered today and pending.  Current rec is for referral if titer >1:4.  Will advise if needs to return to [facility] once resulted.  Monitor weight gain closely, advised patient to check periodically at home.   Growth US at 36.1 + 4 days from estimated, 2978 g and 59th percentile, done for weight gain >goal for pregnancy and maternal obesity. FH is trending appropriately.  May pursue weekly BPPs starting 37 weeks dependant on growth US results. She will advise next week if she wishes to pursue the BPPs.   Passed 3hr GTT.  Cont POFe for anemia of pregnancy.  Discussed PTL, PROM, and when to call or come in.  Prenatal flowsheet information is reviewed.  Discussed signs of labor and when to call or come in.  Discussed kick counts and fetal movement.  Reportable signs and symptoms discussed.  RTC 1 week or sooner PRN.     Pregnancy Issues              1. Non-ABO abnormal Maternal Antibody Positive (Anti-c): Has seen [facility], per their recs Check anti-c titer qmonthly, if >8 referral back to [facility] for fetal monitoring via MCA Dopplers q 1 -2 weeks for fetal anemia. Recommended assessing paternal RBC antigen status, patient deferred. Planned Growth US at 28 weeks with [facility], if no concerns return to routine PNC.   2. Maternal obesity syndrome - BMI 32.82, > total goal pregnancy weight gain since pre-gestational. Will ctm. Recommend consider  testing starting no later than 37 weeks with weekly BPP's unless other indication to start sooner. Can also consider Growth US at 32 or 36 if trend continues.   3. Anemia of pregnancy third trimester - Recommend POFe every  other day.   4. Abnormal maternal glucose - Failed 1 hr GTT, passed 3 hr.   5. Prior vaginal bleeding/threatened miscarriage/remnant alexei-cervical clot: potentially increased risk for PPROM and  delivery, encouraged to seek care if bright red bleeding or concerns for ROM.               Prenatal care plan              - prior deliveries: one vaginal  - OB Labs of concern: Anti-c antibody positive              - Rh- A POS              - First trimester screening:  NIPS nl              - Labor pain management:  thinking yes at point              - Ped:  Discuss at next visit.              - Circumcision if boy: Yes, but would like in clinic due to cost              - Birth control: yes, probably IUD and would go for mid level dose as plans for pregnancy in few years. Discuss again at next visit.              - Breast feeding:  Yes              - Covid 19 vaccine:  Not Done              - influenza vaccine: Not Done              - Tdap: Done 7/15    Tena López, DO

## 2024-09-05 LAB — GP B STREP DNA SPEC QL NAA+PROBE: POSITIVE

## 2024-09-09 ENCOUNTER — MYC MEDICAL ADVICE (OUTPATIENT)
Dept: FAMILY MEDICINE | Facility: CLINIC | Age: 24
End: 2024-09-09

## 2024-09-09 ENCOUNTER — PRENATAL OFFICE VISIT (OUTPATIENT)
Dept: FAMILY MEDICINE | Facility: CLINIC | Age: 24
End: 2024-09-09
Payer: MEDICAID

## 2024-09-09 VITALS
HEART RATE: 88 BPM | DIASTOLIC BLOOD PRESSURE: 72 MMHG | RESPIRATION RATE: 18 BRPM | SYSTOLIC BLOOD PRESSURE: 108 MMHG | TEMPERATURE: 97.6 F | WEIGHT: 196.4 LBS | HEIGHT: 64 IN | BODY MASS INDEX: 33.53 KG/M2 | OXYGEN SATURATION: 99 %

## 2024-09-09 DIAGNOSIS — K21.9 GASTROESOPHAGEAL REFLUX IN PREGNANCY: ICD-10-CM

## 2024-09-09 DIAGNOSIS — O99.013 ANEMIA OF PREGNANCY IN THIRD TRIMESTER: ICD-10-CM

## 2024-09-09 DIAGNOSIS — O99.213 MATERNAL OBESITY SYNDROME IN THIRD TRIMESTER: ICD-10-CM

## 2024-09-09 DIAGNOSIS — O99.619 GASTROESOPHAGEAL REFLUX IN PREGNANCY: ICD-10-CM

## 2024-09-09 DIAGNOSIS — O09.93 HIGH-RISK PREGNANCY, THIRD TRIMESTER: Primary | ICD-10-CM

## 2024-09-09 DIAGNOSIS — O36.1920 MATERNAL ATYPICAL ANTIBODY AFFECTING PREGNANCY IN SECOND TRIMESTER, SINGLE OR UNSPECIFIED FETUS: ICD-10-CM

## 2024-09-09 PROCEDURE — 99207 PR COMPLICATED OB VISIT: CPT | Performed by: STUDENT IN AN ORGANIZED HEALTH CARE EDUCATION/TRAINING PROGRAM

## 2024-09-09 ASSESSMENT — PAIN SCALES - GENERAL: PAINLEVEL: NO PAIN (0)

## 2024-09-09 NOTE — PATIENT INSTRUCTIONS
"Week 37 of Your Pregnancy: Care Instructions    Most babies are born between 37 and 40 weeks.   This is a good time to pack a bag to take with you to the birth. Then it will be ready to go when you are.     Learn about breastfeeding.  For example, find out about ways to hold your baby to make breastfeeding easier. And think about learning how to pump and store milk.     Know that crying is normal.  It's common for babies to cry 1 to 3 hours a day. Some cry more, and some cry less.     Learn why babies cry.  They may be hungry; have gas; need a diaper change; or feel cold, warm, tired, lonely, or tense. Sometimes they cry for unknown reasons.     Think about what will help you stay calm when your baby cries.  Taking slow, deep breaths can help. So can taking a break. It's okay to put your baby somewhere safe (like their crib) and walk away for a few minutes.     Learn about safe sleep for your baby.  Always put your baby to sleep on their back. Place them alone in a crib or bassinet with a firm, flat surface. Keep soft items like stuffed animals out of the crib.     Learn what to expect with  poop.  Your baby will have their own bowel patterns. Some babies have several bowel movements a day. Some have fewer.     Know that  babies will often have loose, yellow bowel movements.  Formula-fed babies have more formed stools. If your baby's poop looks like pellets, your baby is constipated.   Follow-up care is a key part of your treatment and safety. Be sure to make and go to all appointments, and call your doctor if you are having problems. It's also a good idea to know your test results and keep a list of the medicines you take.  Where can you learn more?  Go to https://www.[employer].net/patiented  Enter N257 in the search box to learn more about \"Week 37 of Your Pregnancy: Care Instructions.\"  Current as of: July 10, 2023  Content Version: 14. [employer].   Care instructions " adapted under license by your healthcare professional. If you have questions about a medical condition or this instruction, always ask your healthcare professional. [employer] disclaims any warranty or liability for your use of this information.

## 2024-09-09 NOTE — PROGRESS NOTES
Doing well.    Maybe a couple ctx, no LOF or VB. +FM.  Did not  reflux med yet, will get today.   Cervix is 1cm/thick/high/int/mid   Cephalic position by SVE today.   GBS POS.   Prior antibody titer negative, rpt antibody titer next week at 38 weeks for last check this pregnancy.  Current rec is for referral if titer >1:4.  Will advise if needs to return to [facility] once resulted.  Monitor weight gain closely, advised patient to check periodically at home.   Growth US at 36.1 + 4 days from estimated, 2978 g and 59th percentile, done for weight gain >goal for pregnancy and maternal obesity. FH is trending appropriately.  Declined BPP's for now, would be willing to do one during 40th week if not delivered.   Passed 3hr GTT.  Cont POFe for anemia of pregnancy.  Prenatal flowsheet information is reviewed.  Discussed signs of labor and when to call or come in.  Discussed kick counts and fetal movement.  Reportable signs and symptoms discussed.  RTC 1 week or sooner PRN.     Pregnancy Issues              1. Non-ABO abnormal Maternal Antibody Positive (Anti-c): Has seen [facility], per their recs Check anti-c titer qmonthly, if >8 referral back to [facility] for fetal monitoring via MCA Dopplers q 1 -2 weeks for fetal anemia. Recommended assessing paternal RBC antigen status, patient deferred. Planned Growth US at 28 weeks with [facility], if no concerns return to routine PNC.   2. Maternal obesity syndrome - BMI 32.82, > total goal pregnancy weight gain since pre-gestational. Will ctm. Recommend consider  testing starting no later than 37 weeks with weekly BPP's unless other indication to start sooner. Can also consider Growth US at 32 or 36 if trend continues.   3. Anemia of pregnancy third trimester - Recommend POFe every other day.   4. Abnormal maternal glucose - Failed 1 hr GTT, passed 3 hr.   5. Prior vaginal bleeding/threatened miscarriage/remnant alexei-cervical clot: potentially increased risk for PPROM and  delivery,  encouraged to seek care if bright red bleeding or concerns for ROM.               Prenatal care plan              - prior deliveries: one vaginal  - OB Labs of concern: Anti-c antibody positive              - Rh- A POS              - First trimester screening:  NIPS nl              - Labor pain management:  thinking yes at point              - Ped:  Discuss at next visit.              - Circumcision if boy: Yes, but would like in clinic due to cost              - Birth control: yes, probably IUD and would go for mid level dose as plans for pregnancy in few years. Discuss again at next visit.              - Breast feeding:  Yes              - Covid 19 vaccine:  Not Done              - influenza vaccine: Not Done              - Tdap: Done 7/15     Tena López, DO

## 2024-09-10 NOTE — TELEPHONE ENCOUNTER
Team: Please call patient and advise:  Some swelling may not be abnormal as late in pregnancy there can be a tendency to retain more fluid. We are always watching for symtpoms of preeclampsia throughout pregnancy, though you did not have the major issues we find with that at your visit yesterday and your blood pressure was beautiful. I would try to avoid too much salt, stay well hydrated, and you can try to massage the fluid from the ends of your fingers towards your body to reduce the swelling. If you begin having any additional symptoms such as shortness of breath/chest pain, headaches not responsive to tylenol, changes in vision, right upper quadrant pain, and/or significant swelling of your ankles then I recommend you get in to be evaluated in triage in case something else has begun to develop.    Tena López, DO

## 2024-09-13 ENCOUNTER — NURSE TRIAGE (OUTPATIENT)
Dept: FAMILY MEDICINE | Facility: CLINIC | Age: 24
End: 2024-09-13
Payer: MEDICAID

## 2024-09-13 ENCOUNTER — MYC MEDICAL ADVICE (OUTPATIENT)
Dept: FAMILY MEDICINE | Facility: CLINIC | Age: 24
End: 2024-09-13
Payer: MEDICAID

## 2024-09-13 ENCOUNTER — HOSPITAL ENCOUNTER (OUTPATIENT)
Facility: CLINIC | Age: 24
Discharge: HOME | End: 2024-09-13
Attending: FAMILY MEDICINE | Admitting: FAMILY MEDICINE
Payer: MEDICAID

## 2024-09-13 VITALS
OXYGEN SATURATION: 98 % | SYSTOLIC BLOOD PRESSURE: 120 MMHG | RESPIRATION RATE: 18 BRPM | TEMPERATURE: 97.8 F | DIASTOLIC BLOOD PRESSURE: 57 MMHG

## 2024-09-13 PROBLEM — Z36.89 ENCOUNTER FOR TRIAGE IN PREGNANT PATIENT: Status: ACTIVE | Noted: 2024-09-13

## 2024-09-13 LAB
CLUE CELLS: ABNORMAL
TRICHOMONAS, WET PREP: ABNORMAL
WBC'S/HIGH POWER FIELD, WET PREP: ABNORMAL
YEAST, WET PREP: ABNORMAL

## 2024-09-13 PROCEDURE — G0463 HOSPITAL OUTPT CLINIC VISIT: HCPCS | Mod: 25

## 2024-09-13 PROCEDURE — 87210 SMEAR WET MOUNT SALINE/INK: CPT | Performed by: FAMILY MEDICINE

## 2024-09-13 PROCEDURE — G0463 HOSPITAL OUTPT CLINIC VISIT: HCPCS

## 2024-09-13 PROCEDURE — 59025 FETAL NON-STRESS TEST: CPT

## 2024-09-13 RX ORDER — LIDOCAINE 40 MG/G
CREAM TOPICAL
Status: DISCONTINUED | OUTPATIENT
Start: 2024-09-13 | End: 2024-09-13 | Stop reason: HOSPADM

## 2024-09-13 ASSESSMENT — ACTIVITIES OF DAILY LIVING (ADL): ADLS_ACUITY_SCORE: 20

## 2024-09-13 NOTE — PROGRESS NOTES
S:  Discharge from triage.   A:  FHT's 145, Moderate variability, Accels present, no decels noted. Contractions 1 in 30.  Cervical exam not completed.  R:  Written and verbal D/C instruction provided. Pt. Verbalized understanding of D/C instructions and will follow up with Dr. López as previously scheduled.   Verbalizes understanding that she will call or return to [facility] with any further questions or concerns.   Pt. D/C'd via ambulation with self    Nursing Discharge Checklist  Discharge order entered: Yes  Patient care order entered: Yes  Charges entered: Yes  IV start and stop times have been documented in Epic?  NA  NST start and stop times have been documented in Epic Doc F/S? Yes

## 2024-09-13 NOTE — PROGRESS NOTES
Wet prep collected. Pt reports she does not want to wait for results and needs to go  her daughter from school. Patient reports that she will watch her mychart for results and contact her provider if any concerns. RN educated patient and encouraged her to stay for results and patient declined at this time. Dr. Elias updated and OK with this plan. Patient will be discharged.

## 2024-09-13 NOTE — PROGRESS NOTES
S: Triage Arrival  B: Lakeshia is a 24 y.o.  @ 37w 4d who presents with complaint of decreased fetal movement and light cramps.   A: EFM applied. FHT's 145 with moderate variability, accels present, no decels noted on strip. Contractions none.  R: Will notify MD to obtain further orders.

## 2024-09-13 NOTE — PROGRESS NOTES
S: MD update  A:  Dr. Elias informed of patient C/O decreased fetal movement, cramping, and clear/white discharge that is sticky.  FHT's145 with moderate variability, accels present, no decels noted.  Contractions none.  R: Orders received. Wet prep to be collected and complete NST.

## 2024-09-13 NOTE — TELEPHONE ENCOUNTER
RN Triage    Patient Contact    Attempt # 1    Was call answered?  No.  Unable to leave message.    Perlita Rider RN on 9/13/2024 at 10:08 AM

## 2024-09-13 NOTE — TELEPHONE ENCOUNTER
"Nurse Triage SBAR    Is this a 2nd Level Triage? NO    Situation: Decreased fetal movement @ 37 weeks    Background: Patient is concerned with abdominal cramping, decreased fetal movement. History of zara hightower, but states this is different than those. Patient states she had experienced clear, white, sticky fluid leaking from her vagina. Patient states she wakes up and her underwear are wet.     Assessment: Patient states she has not felt her baby move in over an hour or two, unsure when she last felt baby move. Kick count: zero past hour. Patient denies bleeding, fever. Patient reports her hands are swollen, vaginal discharge, decreased fetal movement.    Protocol Recommended Disposition:   Go To LD Now (Or To Office With PCP Approval)    Recommendation: Advised patient to go to L&D to be evaluated. Patient verbalized understanding, agreeable to plan and no further questions at this time.    Rosalia Griggs RN on 9/13/2024 at 12:54 PM    Reason for Disposition   New hand or face swelling    Additional Information   Negative: Blurred vision or visual change   Negative: SEVERE headache and not relieved with acetaminophen (e.g., Tylenol)   Negative: Leakage of fluid from vagina   Negative: Sounds like a life-threatening emergency to the triager   Negative: Pregnant 23 or more weeks with no movement of baby > 8 hours   Negative: Fever > 100.4 F (38.0 C)   Negative: Pregnant 23 or more weeks and baby moving less today by kick count (e.g., kick count < 5 in 1 hour or < 10 in 2 hours)   Negative: Pregnant 23 or more weeks and baby moving less today AND unable (or unwilling) to perform kick count   Negative: Pregnant 23 or more weeks with normal kick count BUT mother still thinks there is something wrong    Answer Assessment - Initial Assessment Questions  1. FETAL MOVEMENT: \"Has the baby's movement decreased or changed significantly from normal?\" (e.g., yes, no; describe)     Decreased fetal movement  2. ZULY: \"What " "date are you expecting to deliver?\"       09/30/2024  3. PREGNANCY: \"How many weeks pregnant are you?\"       37 weeks  4. OTHER SYMPTOMS: \"Do you have any other symptoms?\" (e.g., abdominal pain, leaking fluid from vagina, vaginal bleeding, etc.)      Reports abdominal pain/cramps, vaginal discharge - clear, white, sticky, underwear or wet when wake up in the morning.   Sexual intercourse a few days ago    Protocols used: Pregnancy - Decreased Fetal Movement-A-OH    "

## 2024-09-18 ENCOUNTER — PRENATAL OFFICE VISIT (OUTPATIENT)
Dept: FAMILY MEDICINE | Facility: CLINIC | Age: 24
End: 2024-09-18
Payer: MEDICAID

## 2024-09-18 VITALS
BODY MASS INDEX: 33.47 KG/M2 | HEART RATE: 91 BPM | OXYGEN SATURATION: 100 % | DIASTOLIC BLOOD PRESSURE: 72 MMHG | WEIGHT: 195 LBS | SYSTOLIC BLOOD PRESSURE: 112 MMHG | RESPIRATION RATE: 20 BRPM

## 2024-09-18 DIAGNOSIS — O99.013 ANEMIA OF PREGNANCY IN THIRD TRIMESTER: ICD-10-CM

## 2024-09-18 DIAGNOSIS — K21.9 GASTROESOPHAGEAL REFLUX IN PREGNANCY: ICD-10-CM

## 2024-09-18 DIAGNOSIS — O99.619 GASTROESOPHAGEAL REFLUX IN PREGNANCY: ICD-10-CM

## 2024-09-18 DIAGNOSIS — O99.213 MATERNAL OBESITY SYNDROME IN THIRD TRIMESTER: ICD-10-CM

## 2024-09-18 DIAGNOSIS — Z3A.38 38 WEEKS GESTATION OF PREGNANCY: ICD-10-CM

## 2024-09-18 DIAGNOSIS — O36.1920 MATERNAL ATYPICAL ANTIBODY AFFECTING PREGNANCY IN SECOND TRIMESTER, SINGLE OR UNSPECIFIED FETUS: ICD-10-CM

## 2024-09-18 DIAGNOSIS — O09.93 HIGH-RISK PREGNANCY, THIRD TRIMESTER: Primary | ICD-10-CM

## 2024-09-18 LAB
ANTIBODY SCREEN: POSITIVE
HOLD SPECIMEN: NORMAL
HOLD SPECIMEN: NORMAL
SPECIMEN EXPIRATION DATE: ABNORMAL
SPECIMEN EXPIRATION DATE: NORMAL
XXX BLOOD GROUP AB TITR SERPL: 2 {TITER}
XXX BLOOD GROUP AB TITR SERPL: <1 {TITER}

## 2024-09-18 PROCEDURE — 86870 RBC ANTIBODY IDENTIFICATION: CPT | Performed by: STUDENT IN AN ORGANIZED HEALTH CARE EDUCATION/TRAINING PROGRAM

## 2024-09-18 PROCEDURE — 99207 PR COMPLICATED OB VISIT: CPT | Performed by: STUDENT IN AN ORGANIZED HEALTH CARE EDUCATION/TRAINING PROGRAM

## 2024-09-18 PROCEDURE — 86850 RBC ANTIBODY SCREEN: CPT | Performed by: STUDENT IN AN ORGANIZED HEALTH CARE EDUCATION/TRAINING PROGRAM

## 2024-09-18 PROCEDURE — 99213 OFFICE O/P EST LOW 20 MIN: CPT | Mod: 25 | Performed by: STUDENT IN AN ORGANIZED HEALTH CARE EDUCATION/TRAINING PROGRAM

## 2024-09-18 PROCEDURE — 86886 COOMBS TEST INDIRECT TITER: CPT | Performed by: STUDENT IN AN ORGANIZED HEALTH CARE EDUCATION/TRAINING PROGRAM

## 2024-09-18 PROCEDURE — 36415 COLL VENOUS BLD VENIPUNCTURE: CPT | Performed by: STUDENT IN AN ORGANIZED HEALTH CARE EDUCATION/TRAINING PROGRAM

## 2024-09-18 ASSESSMENT — PAIN SCALES - GENERAL: PAINLEVEL: MILD PAIN (3)

## 2024-09-18 NOTE — PROGRESS NOTES
Doing well.  No concerns today.  Triage visit for concern for DFM and some LBP, released after reassuring NST.  Still occasional BH.   Seems like baby may be lower as FH today down to 38 and last visit was 39 at 37 weeks.   Rechecking antibody one last time.  Continue oral iron for anemia.  No vaginal bleeding, LOF, contractions.  No HA, RUQ pain, N/V, visual changes.  Cervix is 2/20-30/high/ant/soft.  Cephalic position confirmed by cervical exam.  Discussed signs of labor and when to call or come in.  Discussed kick counts and fetal movement.  Reportable signs and symptoms discussed.  Labor precautions discussed.  RTC 1 week or sooner if problems    Tena López DO    A total of 20 minutes were spent on atypical maternal antibody, anemia, gerd, maternal obesity on the day of the encounter in addition to the standard prenatal items for: chart review, history, assessment, exam, results review, documentation and discussing the assessment and plan as above with the patient.      Prenatal Labs:   Lab Results   Component Value Date    AS Positive (A) 2024    HEPBANG Nonreactive 2024    CHPCRT Negative 2024    GCPCRT Negative 2024    HGB 10.3 (L) 07/15/2024        /72   Pulse 91   Resp 20   Wt 88.5 kg (195 lb)   LMP 2023   SpO2 100%   BMI 33.47 kg/m     See flowsheet for cervical exam, FH, FHT, position  Membranes stripped: Yes    Pregnancy Issues              1. Non-ABO abnormal Maternal Antibody Positive (Anti-c): Last check today. Has been negative up to now based on titer recs from [facility]. Anticipate this last check to be negative as well. Will advise if results indicate need for further follow-up.  2. Maternal obesity syndrome - BMI 33.87, > total goal pregnancy weight gain since pre-gestational, though actually down some this week. Will ctm. Has opted against further  testing at this time.   3. Anemia of pregnancy third trimester - Continuing oral iron every other  day.   4. Reflux - Did not start medication, but reports symptoms improved.   5. Abnormal maternal glucose - Failed 1 hr GTT, passed 3 hr.                Prenatal care plan              - prior deliveries: one vaginal  - OB Labs of concern: Anti-c antibody positive              - Rh- A POS              - First trimester screening:  NIPS nl              - Labor pain management:  thinking yes at point              - Ped:  Revisit at next visit to determine if has made a decision.              - Circumcision if boy: Yes, but would like in clinic due to cost              - Birth control: yes, probably IUD and would go for mid level dose as plans for pregnancy in few years.              - Breast feeding:  Yes              - Covid 19 vaccine:  Not Done              - influenza vaccine: Not Done              - Tdap: Done 7/15

## 2024-09-19 ENCOUNTER — NURSE TRIAGE (OUTPATIENT)
Dept: FAMILY MEDICINE | Facility: CLINIC | Age: 24
End: 2024-09-19
Payer: MEDICAID

## 2024-09-19 ENCOUNTER — MYC MEDICAL ADVICE (OUTPATIENT)
Dept: FAMILY MEDICINE | Facility: CLINIC | Age: 24
End: 2024-09-19
Payer: MEDICAID

## 2024-09-19 ENCOUNTER — MYC MEDICAL ADVICE (OUTPATIENT)
Dept: FAMILY MEDICINE | Facility: CLINIC | Age: 24
End: 2024-09-19

## 2024-09-19 ENCOUNTER — HOSPITAL ENCOUNTER (OUTPATIENT)
Facility: CLINIC | Age: 24
Discharge: HOME | End: 2024-09-19
Attending: FAMILY MEDICINE | Admitting: FAMILY MEDICINE
Payer: MEDICAID

## 2024-09-19 VITALS
RESPIRATION RATE: 16 BRPM | HEIGHT: 63 IN | OXYGEN SATURATION: 98 % | TEMPERATURE: 98.4 F | DIASTOLIC BLOOD PRESSURE: 76 MMHG | WEIGHT: 195 LBS | SYSTOLIC BLOOD PRESSURE: 117 MMHG | BODY MASS INDEX: 34.55 KG/M2 | HEART RATE: 80 BPM

## 2024-09-19 LAB
ANTIBODY ID: NORMAL
SPECIMEN EXPIRATION DATE: NORMAL

## 2024-09-19 PROCEDURE — G0463 HOSPITAL OUTPT CLINIC VISIT: HCPCS | Mod: 25

## 2024-09-19 PROCEDURE — 59025 FETAL NON-STRESS TEST: CPT

## 2024-09-19 RX ORDER — LIDOCAINE 40 MG/G
CREAM TOPICAL
Status: DISCONTINUED | OUTPATIENT
Start: 2024-09-19 | End: 2024-09-19 | Stop reason: HOSPADM

## 2024-09-19 ASSESSMENT — ACTIVITIES OF DAILY LIVING (ADL)
ADLS_ACUITY_SCORE: 20
ADLS_ACUITY_SCORE: 20

## 2024-09-19 NOTE — TELEPHONE ENCOUNTER
RN Triage    Patient Contact    Attempt # 1    Was call answered?  No.  Left message on voicemail with information to call me back. Also sent REMY Jain, RN

## 2024-09-19 NOTE — TELEPHONE ENCOUNTER
"[facility] Triage Call      Is patient's OB affiliated with [facility] or [facility] Clinics? [facility] or [facility]- Proceed with triage     Patient's primary OB Provider is Dr. López          Situation: Patient was seen yesterday in clinic and had her membranes stripped.     Assessment: Patient went to the bathroom last night and noticed \"clear long mucous with a streak of blood\" when she wiped. She states she noticed it this morning at 8am as well when she went to the bathroom. Patient complains of intermittent lower abdominal cramping. She doesn't think it feels like contractions and she has no clue how frequent it is happening. Declines concerns for baby movement.     Plan: RN educated patient when to present to L&D. Patient will continue to monitor symptoms and report concerns or changes. Patient states understanding.     Is the patient going to L&D to be evaluated? No            38w3d    Estimated Date of Delivery: Sep 30, 2024        OB History    Para Term  AB Living   2 1 1 0 0 1   SAB IAB Ectopic Multiple Live Births   0 0 0 0 0      # Outcome Date GA Lbr Jose/2nd Weight Sex Type Anes PTL Lv   2 Current            1 Term 18 39w0d  2.438 kg (5 lb 6 oz) F Vag-Spont   NAMITA      Name: Zeinab      Obstetric Comments   EDC 2024 based on LMP.  Parenting with Jon.  This will be their first delivery at [facility]       No results found for: \"GBS\"       Latisha Ward RN           Reason for Disposition   Pregnant 37 or more weeks (i.e., term) and pinkish or brownish mucous discharge    Additional Information   Negative: Pregnant 20 or more weeks with vaginal bleeding   Negative: Pregnant < 20 weeks with vaginal bleeding   Negative: Pregnant < 37 weeks (i.e., ) and having contractions or other symptoms of labor   Negative: Pregnant 37 or more weeks (i.e., term) and having contractions or other symptoms of labor   Negative: Leakage of fluid (trickle, gush) from the vagina   Negative: Pregnant 23 or more " weeks and baby is moving less today (e.g., kick count < 5 in 1 hour or < 10 in 2 hours)   Negative: Pregnant 24-36 weeks () and pinkish or brownish mucous discharge   Negative: Constant abdominal pain and present > 2 hours   Negative: Intermittent lower abdominal pain lasting > 24 hours   Negative: Patient sounds very sick or weak to the triager   Negative: Yellow or green vaginal discharge and fever   Negative: Genital area looks infected (e.g.,  draining sore, spreading redness)   Negative: Painful rash with tiny water blisters   Negative: Rash (e.g., redness, tiny bumps, sore) of genital area   Negative: Patient wants to be seen   Negative: Abnormal color vaginal discharge (i.e., yellow, green, gray)   Negative: Bad smelling vaginal discharge   Negative: Tender lump (swelling or 'ball') at vaginal opening   Negative: Normal vaginal discharge in pregnancy   Negative: Symptoms of a vaginal yeast infection (i.e., white, thick, cottage-cheese-like, itchy, not bad smelling discharge)   Negative: Home treatment > 3 days for 'yeast infection' and not improved   Negative: 4 or more episodes of vaginal infection in past year   Negative: Patient is worried they have a sexually transmitted infection (STI)   Negative: Pain with sexual intercourse (dyspareunia)    Protocols used: Pregnancy - Vaginal [id number]     yes

## 2024-09-20 ENCOUNTER — TRANSFERRED RECORDS (OUTPATIENT)
Dept: HEALTH INFORMATION MANAGEMENT | Facility: CLINIC | Age: 24
End: 2024-09-20
Payer: MEDICAID

## 2024-09-20 NOTE — TELEPHONE ENCOUNTER
Team: As stated in the result comment, the titer is below the threshold indicated for her other antibody, so it would assumably be viewed in the same way for risk. Baby will be monitored for blood reactions after birth regardless given her anti-c finding, so this new antibody does not . Literature review does not suggest risk for severe disease in the baby due to the antibody and cases more typically had elevated bilirubin after birth that at most required light therapy.    Tena López, DO

## 2024-09-20 NOTE — PROGRESS NOTES
S: MD update  A:  Dr. Avalos informed of patient C/O decreased fetal movement.  FHT's140 with moderate variability, accels present, one isolated late deceleration noted, provider in house and seen strip, okayed for patient to be discharged, as continued strip reactive NST. Contractions q 7-9 minutes, reports some mucus discharge since membrane sweep, yesterday. Provider okay with no cervical exam at this time, as patient is here for decreased fetal movement. Provider aware of headache and slight swelling. Dr. Avalos recommends patient take oral tylenol 650 mg's, either here prior to discharge or at home and orally hydrate.  R: No new orders.

## 2024-09-20 NOTE — PROGRESS NOTES
S: Triage Arrival  B: Lakeshia is a 24 y.o.  @ 38w 3d who presents with complaint of  fetal movement.  A: EFM applied. FHT's 140 with moderate variability, accels present, 1 late decels noted on strip at 1751. Contractions intermittent q7-9, rating them 5/10.  R: Will notify MD to obtain further orders.

## 2024-09-20 NOTE — PROGRESS NOTES
S:  Discharge from triage.   A:  FHT's 140, Moderate variability, Accels present, 1 late decels noted, provider aware. Contractions q7-9 min.  Cervical exam not performed.  R:  Written and verbal D/C instruction provided. Pt. Verbalized understanding of D/C instructions and will follow up with OB provider as previously scheduled.   Verbalizes understanding that she will call or return to [facility] with any further questions or concerns.   Pt. D/C'd via ambulation with Jon her significant other and daughter.    Nursing Discharge Checklist  Discharge order entered: Yes  Patient care order entered: Yes  Charges entered: Yes  IV start and stop times have been documented in Epic?  N/a  NST start and stop times have been documented in Epic Doc F/S? Yes

## 2024-09-22 ENCOUNTER — ANESTHESIA EVENT (OUTPATIENT)
Dept: OBGYN | Facility: CLINIC | Age: 24
End: 2024-09-22
Payer: MEDICAID

## 2024-09-22 ENCOUNTER — ANESTHESIA (OUTPATIENT)
Dept: OBGYN | Facility: CLINIC | Age: 24
End: 2024-09-22
Payer: MEDICAID

## 2024-09-22 ENCOUNTER — HOSPITAL ENCOUNTER (INPATIENT)
Facility: CLINIC | Age: 24
LOS: 2 days | Discharge: HOME | End: 2024-09-24
Attending: FAMILY MEDICINE | Admitting: FAMILY MEDICINE
Payer: MEDICAID

## 2024-09-22 PROBLEM — Z37.9 NORMAL LABOR: Status: ACTIVE | Noted: 2024-09-22

## 2024-09-22 LAB
ABO/RH(D): ABNORMAL
ANTIBODY ID: NORMAL
ANTIBODY SCREEN: POSITIVE
ANTIBODY, PREVIOUSLY IDENTIFIED: NORMAL
C AG RBC QL: NEGATIVE
ERYTHROCYTE [DISTWIDTH] IN BLOOD BY AUTOMATED COUNT: 14.5 % (ref 10–15)
HCT VFR BLD AUTO: 35.8 % (ref 35–47)
HGB BLD-MCNC: 12.1 G/DL (ref 11.7–15.7)
JK SUP(A) AG RBC QL: NEGATIVE
MCH RBC QN AUTO: 29.5 PG (ref 26.5–33)
MCHC RBC AUTO-ENTMCNC: 33.8 G/DL (ref 31.5–36.5)
MCV RBC AUTO: 87 FL (ref 78–100)
PLATELET # BLD AUTO: 314 10E3/UL (ref 150–450)
RBC # BLD AUTO: 4.1 10E6/UL (ref 3.8–5.2)
SPECIMEN EXPIRATION DATE: ABNORMAL
SPECIMEN EXPIRATION DATE: NORMAL
SPECIMEN EXPIRATION DATE: NORMAL
WBC # BLD AUTO: 10 10E3/UL (ref 4–11)

## 2024-09-22 PROCEDURE — 120N000001 HC R&B MED SURG/OB

## 2024-09-22 PROCEDURE — 86905 BLOOD TYPING RBC ANTIGENS: CPT | Performed by: FAMILY MEDICINE

## 2024-09-22 PROCEDURE — 250N000011 HC RX IP 250 OP 636: Performed by: NURSE ANESTHETIST, CERTIFIED REGISTERED

## 2024-09-22 PROCEDURE — 85027 COMPLETE CBC AUTOMATED: CPT | Performed by: FAMILY MEDICINE

## 2024-09-22 PROCEDURE — 86900 BLOOD TYPING SEROLOGIC ABO: CPT | Performed by: FAMILY MEDICINE

## 2024-09-22 PROCEDURE — 250N000009 HC RX 250: Performed by: NURSE ANESTHETIST, CERTIFIED REGISTERED

## 2024-09-22 PROCEDURE — 370N000003 HC ANESTHESIA WARD SERVICE: Performed by: NURSE ANESTHETIST, CERTIFIED REGISTERED

## 2024-09-22 PROCEDURE — 250N000011 HC RX IP 250 OP 636: Performed by: FAMILY MEDICINE

## 2024-09-22 PROCEDURE — 258N000003 HC RX IP 258 OP 636: Performed by: FAMILY MEDICINE

## 2024-09-22 PROCEDURE — 86780 TREPONEMA PALLIDUM: CPT | Performed by: FAMILY MEDICINE

## 2024-09-22 PROCEDURE — 00HU33Z INSERTION OF INFUSION DEVICE INTO SPINAL CANAL, PERCUTANEOUS APPROACH: ICD-10-PCS | Performed by: FAMILY MEDICINE

## 2024-09-22 PROCEDURE — 3E0R3BZ INTRODUCTION OF ANESTHETIC AGENT INTO SPINAL CANAL, PERCUTANEOUS APPROACH: ICD-10-PCS | Performed by: FAMILY MEDICINE

## 2024-09-22 PROCEDURE — 250N000009 HC RX 250: Performed by: FAMILY MEDICINE

## 2024-09-22 RX ORDER — EPHEDRINE SULFATE 50 MG/ML
5 INJECTION, SOLUTION INTRAMUSCULAR; INTRAVENOUS; SUBCUTANEOUS
Status: DISCONTINUED | OUTPATIENT
Start: 2024-09-22 | End: 2024-09-23 | Stop reason: HOSPADM

## 2024-09-22 RX ORDER — CITRIC ACID/SODIUM CITRATE 334-500MG
30 SOLUTION, ORAL ORAL
Status: DISCONTINUED | OUTPATIENT
Start: 2024-09-22 | End: 2024-09-23 | Stop reason: HOSPADM

## 2024-09-22 RX ORDER — NALOXONE HYDROCHLORIDE 0.4 MG/ML
0.2 INJECTION, SOLUTION INTRAMUSCULAR; INTRAVENOUS; SUBCUTANEOUS
Status: DISCONTINUED | OUTPATIENT
Start: 2024-09-22 | End: 2024-09-23 | Stop reason: HOSPADM

## 2024-09-22 RX ORDER — OXYTOCIN 10 [USP'U]/ML
10 INJECTION, SOLUTION INTRAMUSCULAR; INTRAVENOUS
Status: DISCONTINUED | OUTPATIENT
Start: 2024-09-22 | End: 2024-09-23 | Stop reason: HOSPADM

## 2024-09-22 RX ORDER — OXYTOCIN 10 [USP'U]/ML
10 INJECTION, SOLUTION INTRAMUSCULAR; INTRAVENOUS
Status: COMPLETED | OUTPATIENT
Start: 2024-09-22 | End: 2024-09-23

## 2024-09-22 RX ORDER — NALOXONE HYDROCHLORIDE 0.4 MG/ML
0.4 INJECTION, SOLUTION INTRAMUSCULAR; INTRAVENOUS; SUBCUTANEOUS
Status: DISCONTINUED | OUTPATIENT
Start: 2024-09-22 | End: 2024-09-23 | Stop reason: HOSPADM

## 2024-09-22 RX ORDER — SODIUM CHLORIDE, SODIUM LACTATE, POTASSIUM CHLORIDE, CALCIUM CHLORIDE 600; 310; 30; 20 MG/100ML; MG/100ML; MG/100ML; MG/100ML
INJECTION, SOLUTION INTRAVENOUS CONTINUOUS PRN
Status: DISCONTINUED | OUTPATIENT
Start: 2024-09-22 | End: 2024-09-23 | Stop reason: HOSPADM

## 2024-09-22 RX ORDER — LOPERAMIDE HCL 2 MG
2 CAPSULE ORAL
Status: DISCONTINUED | OUTPATIENT
Start: 2024-09-22 | End: 2024-09-23 | Stop reason: HOSPADM

## 2024-09-22 RX ORDER — KETOROLAC TROMETHAMINE 30 MG/ML
30 INJECTION, SOLUTION INTRAMUSCULAR; INTRAVENOUS
Status: DISCONTINUED | OUTPATIENT
Start: 2024-09-22 | End: 2024-09-23

## 2024-09-22 RX ORDER — FENTANYL CITRATE 50 UG/ML
50 INJECTION, SOLUTION INTRAMUSCULAR; INTRAVENOUS EVERY 30 MIN PRN
Status: DISCONTINUED | OUTPATIENT
Start: 2024-09-22 | End: 2024-09-23 | Stop reason: HOSPADM

## 2024-09-22 RX ORDER — LIDOCAINE 40 MG/G
CREAM TOPICAL
Status: DISCONTINUED | OUTPATIENT
Start: 2024-09-22 | End: 2024-09-23 | Stop reason: HOSPADM

## 2024-09-22 RX ORDER — LOPERAMIDE HCL 2 MG
4 CAPSULE ORAL
Status: DISCONTINUED | OUTPATIENT
Start: 2024-09-22 | End: 2024-09-23 | Stop reason: HOSPADM

## 2024-09-22 RX ORDER — NALBUPHINE HYDROCHLORIDE 10 MG/ML
2.5-5 INJECTION, SOLUTION INTRAMUSCULAR; INTRAVENOUS; SUBCUTANEOUS EVERY 6 HOURS PRN
Status: DISCONTINUED | OUTPATIENT
Start: 2024-09-22 | End: 2024-09-24 | Stop reason: HOSPADM

## 2024-09-22 RX ORDER — PROCHLORPERAZINE MALEATE 5 MG
10 TABLET ORAL EVERY 6 HOURS PRN
Status: DISCONTINUED | OUTPATIENT
Start: 2024-09-22 | End: 2024-09-23 | Stop reason: HOSPADM

## 2024-09-22 RX ORDER — OXYTOCIN/0.9 % SODIUM CHLORIDE 30/500 ML
340 PLASTIC BAG, INJECTION (ML) INTRAVENOUS CONTINUOUS PRN
Status: DISCONTINUED | OUTPATIENT
Start: 2024-09-22 | End: 2024-09-23 | Stop reason: HOSPADM

## 2024-09-22 RX ORDER — ONDANSETRON 4 MG/1
4 TABLET, ORALLY DISINTEGRATING ORAL EVERY 6 HOURS PRN
Status: DISCONTINUED | OUTPATIENT
Start: 2024-09-22 | End: 2024-09-23 | Stop reason: HOSPADM

## 2024-09-22 RX ORDER — IBUPROFEN 800 MG/1
800 TABLET, FILM COATED ORAL
Status: DISCONTINUED | OUTPATIENT
Start: 2024-09-22 | End: 2024-09-23

## 2024-09-22 RX ORDER — BUPIVACAINE HYDROCHLORIDE 2.5 MG/ML
INJECTION, SOLUTION EPIDURAL; INFILTRATION; INTRACAUDAL PRN
Status: DISCONTINUED | OUTPATIENT
Start: 2024-09-22 | End: 2024-09-23

## 2024-09-22 RX ORDER — CARBOPROST TROMETHAMINE 250 UG/ML
250 INJECTION, SOLUTION INTRAMUSCULAR
Status: DISCONTINUED | OUTPATIENT
Start: 2024-09-22 | End: 2024-09-23 | Stop reason: HOSPADM

## 2024-09-22 RX ORDER — PENICILLIN G 3000000 [IU]/50ML
3 INJECTION, SOLUTION INTRAVENOUS EVERY 4 HOURS
Status: DISCONTINUED | OUTPATIENT
Start: 2024-09-22 | End: 2024-09-23 | Stop reason: HOSPADM

## 2024-09-22 RX ORDER — PENICILLIN G POTASSIUM 5000000 [IU]/1
5 INJECTION, POWDER, FOR SOLUTION INTRAMUSCULAR; INTRAVENOUS ONCE
Status: COMPLETED | OUTPATIENT
Start: 2024-09-22 | End: 2024-09-22

## 2024-09-22 RX ORDER — METHYLERGONOVINE MALEATE 0.2 MG/ML
200 INJECTION INTRAVENOUS
Status: DISCONTINUED | OUTPATIENT
Start: 2024-09-22 | End: 2024-09-23 | Stop reason: HOSPADM

## 2024-09-22 RX ORDER — LIDOCAINE HYDROCHLORIDE AND EPINEPHRINE 15; 5 MG/ML; UG/ML
INJECTION, SOLUTION EPIDURAL PRN
Status: DISCONTINUED | OUTPATIENT
Start: 2024-09-22 | End: 2024-09-23

## 2024-09-22 RX ORDER — MISOPROSTOL 200 UG/1
400 TABLET ORAL
Status: DISCONTINUED | OUTPATIENT
Start: 2024-09-22 | End: 2024-09-23 | Stop reason: HOSPADM

## 2024-09-22 RX ORDER — ONDANSETRON 2 MG/ML
4 INJECTION INTRAMUSCULAR; INTRAVENOUS EVERY 6 HOURS PRN
Status: DISCONTINUED | OUTPATIENT
Start: 2024-09-22 | End: 2024-09-23 | Stop reason: HOSPADM

## 2024-09-22 RX ORDER — METOCLOPRAMIDE 5 MG/1
10 TABLET ORAL EVERY 6 HOURS PRN
Status: DISCONTINUED | OUTPATIENT
Start: 2024-09-22 | End: 2024-09-23 | Stop reason: HOSPADM

## 2024-09-22 RX ORDER — METOCLOPRAMIDE HYDROCHLORIDE 5 MG/ML
10 INJECTION INTRAMUSCULAR; INTRAVENOUS EVERY 6 HOURS PRN
Status: DISCONTINUED | OUTPATIENT
Start: 2024-09-22 | End: 2024-09-23 | Stop reason: HOSPADM

## 2024-09-22 RX ORDER — TRANEXAMIC ACID 10 MG/ML
1 INJECTION, SOLUTION INTRAVENOUS EVERY 30 MIN PRN
Status: DISCONTINUED | OUTPATIENT
Start: 2024-09-22 | End: 2024-09-23 | Stop reason: HOSPADM

## 2024-09-22 RX ORDER — OXYTOCIN/0.9 % SODIUM CHLORIDE 30/500 ML
100-340 PLASTIC BAG, INJECTION (ML) INTRAVENOUS CONTINUOUS PRN
Status: DISCONTINUED | OUTPATIENT
Start: 2024-09-22 | End: 2024-09-23

## 2024-09-22 RX ORDER — OXYTOCIN/0.9 % SODIUM CHLORIDE 30/500 ML
1-24 PLASTIC BAG, INJECTION (ML) INTRAVENOUS CONTINUOUS
Status: DISCONTINUED | OUTPATIENT
Start: 2024-09-22 | End: 2024-09-23 | Stop reason: HOSPADM

## 2024-09-22 RX ORDER — SODIUM CHLORIDE, SODIUM LACTATE, POTASSIUM CHLORIDE, CALCIUM CHLORIDE 600; 310; 30; 20 MG/100ML; MG/100ML; MG/100ML; MG/100ML
INJECTION, SOLUTION INTRAVENOUS CONTINUOUS
Status: DISCONTINUED | OUTPATIENT
Start: 2024-09-22 | End: 2024-09-23 | Stop reason: HOSPADM

## 2024-09-22 RX ADMIN — Medication 2 MILLI-UNITS/MIN: at 13:52

## 2024-09-22 RX ADMIN — BUPIVACAINE HYDROCHLORIDE 8 ML: 2.5 INJECTION, SOLUTION EPIDURAL; INFILTRATION; INTRACAUDAL at 19:05

## 2024-09-22 RX ADMIN — PENICILLIN G 3 MILLION UNITS: 3000000 INJECTION, SOLUTION INTRAVENOUS at 22:02

## 2024-09-22 RX ADMIN — SODIUM CHLORIDE, POTASSIUM CHLORIDE, SODIUM LACTATE AND CALCIUM CHLORIDE: 600; 310; 30; 20 INJECTION, SOLUTION INTRAVENOUS at 06:07

## 2024-09-22 RX ADMIN — PENICILLIN G 3 MILLION UNITS: 3000000 INJECTION, SOLUTION INTRAVENOUS at 18:45

## 2024-09-22 RX ADMIN — Medication 2 MILLI-UNITS/MIN: at 21:30

## 2024-09-22 RX ADMIN — PENICILLIN G POTASSIUM 5 MILLION UNITS: 5000000 POWDER, FOR SOLUTION INTRAMUSCULAR; INTRAPLEURAL; INTRATHECAL; INTRAVENOUS at 05:57

## 2024-09-22 RX ADMIN — FENTANYL CITRATE 50 MCG: 50 INJECTION, SOLUTION INTRAMUSCULAR; INTRAVENOUS at 18:11

## 2024-09-22 RX ADMIN — Medication: at 19:08

## 2024-09-22 RX ADMIN — PENICILLIN G 3 MILLION UNITS: 3000000 INJECTION, SOLUTION INTRAVENOUS at 09:55

## 2024-09-22 RX ADMIN — LIDOCAINE HYDROCHLORIDE,EPINEPHRINE BITARTRATE 3.5 ML: 15; .005 INJECTION, SOLUTION EPIDURAL; INFILTRATION; INTRACAUDAL; PERINEURAL at 19:02

## 2024-09-22 RX ADMIN — PENICILLIN G 3 MILLION UNITS: 3000000 INJECTION, SOLUTION INTRAVENOUS at 14:05

## 2024-09-22 ASSESSMENT — ACTIVITIES OF DAILY LIVING (ADL)
ADLS_ACUITY_SCORE: 20
ADLS_ACUITY_SCORE: 24
ADLS_ACUITY_SCORE: 24
ADLS_ACUITY_SCORE: 20
ADLS_ACUITY_SCORE: 35
ADLS_ACUITY_SCORE: 20
ADLS_ACUITY_SCORE: 20
ADLS_ACUITY_SCORE: 24
ADLS_ACUITY_SCORE: 20
ADLS_ACUITY_SCORE: 24
ADLS_ACUITY_SCORE: 20

## 2024-09-22 NOTE — PROGRESS NOTES
Dr. MAGDALENO bedside. Patient is comfortable, feels as though contractions are now only occasional and feels like a period cramp, not painful. SVe per MD 3/20/-4. Plan for patient to ambulate/out of bed for 2 hours. If contraction do not return or intensify will start pitocin. Patient agreeable with plan.

## 2024-09-22 NOTE — PROGRESS NOTES
Stopped pitcon at this time as patient is not getting a break between contraction. 9/10 pain and awaiting epidural placement

## 2024-09-22 NOTE — ANESTHESIA PREPROCEDURE EVALUATION
Anesthesia Pre-Procedure Evaluation    Patient: Lakeshia Phillips   MRN: [id number] : 2000        Procedure : * No procedures listed *          Past Medical History:   Diagnosis Date    History of anxiety     as a teenager    History of depression     as a teenager      Past Surgical History:   Procedure Laterality Date    APPENDECTOMY  2002    TONSILLECTOMY      WISDOM TOOTH EXTRACTION        Allergies   Allergen Reactions    Blood Transfusion Related (Informational Only) Other (See Comments)     Patient has a history of a clinically significant antibody against RBC antigens.  A delay in compatible RBCs may occur. Anti-C identified at [facility] on 24, Unidentified antibody identified at [facility] on 24, Anti-Jka identified at [facility] on 24.    Tramadol Other (See Comments)     Anxiety/panic attacks      Social History     Tobacco Use    Smoking status: Former     Types: Cigarettes    Smokeless tobacco: Never   Substance Use Topics    Alcohol use: Not Currently      Wt Readings from Last 1 Encounters:   24 88.5 kg (195 lb)        Anesthesia Evaluation   Pt has had prior anesthetic.         ROS/MED HX  ENT/Pulmonary:  - neg pulmonary ROS     Neurologic:  - neg neurologic ROS     Cardiovascular:  - neg cardiovascular ROS     METS/Exercise Tolerance:     Hematologic:  - neg hematologic  ROS     Musculoskeletal:       GI/Hepatic:  - neg GI/hepatic ROS     Renal/Genitourinary:       Endo:  - neg endo ROS     Psychiatric/Substance Use:  - neg psychiatric ROS     Infectious Disease:       Malignancy:       Other:            Physical Exam    Airway        Mallampati: I   TM distance: > 3 FB   Neck ROM: full   Mouth opening: > 3 cm    Respiratory Devices and Support         Dental  no notable dental history         Cardiovascular   cardiovascular exam normal          Pulmonary   pulmonary exam normal                OUTSIDE LABS:  CBC:   Lab Results   Component Value Date    WBC  "10.0 09/22/2024    WBC 10.2 07/15/2024    HGB 12.1 09/22/2024    HGB 10.3 (L) 07/15/2024    HCT 35.8 09/22/2024    HCT 31.0 (L) 07/15/2024     09/22/2024     07/15/2024     BMP:   Lab Results   Component Value Date     (L) 04/28/2024     (L) 03/20/2024    POTASSIUM 3.8 04/28/2024    POTASSIUM 3.9 03/20/2024    CHLORIDE 100 04/28/2024    CHLORIDE 101 03/20/2024    CO2 20 (L) 04/28/2024    CO2 22 03/20/2024    BUN 6.9 04/28/2024    BUN 6.7 03/20/2024    CR 0.45 (L) 04/28/2024    CR 0.42 (L) 03/20/2024    GLC 89 04/28/2024    GLC 81 03/20/2024     COAGS: No results found for: \"PTT\", \"INR\", \"FIBR\"  POC: No results found for: \"BGM\", \"HCG\", \"HCGS\"  HEPATIC:   Lab Results   Component Value Date    ALBUMIN 4.1 04/28/2024    PROTTOTAL 7.0 04/28/2024    ALT 59 (H) 04/28/2024    AST 46 (H) 04/28/2024    ALKPHOS 59 04/28/2024    BILITOTAL 0.3 04/28/2024     OTHER:   Lab Results   Component Value Date    HU 9.2 04/28/2024       Anesthesia Plan    ASA Status:  2       Anesthesia Type: Epidural.              Consents    Anesthesia Plan(s) and associated risks, benefits, and realistic alternatives discussed. Questions answered and patient/representative(s) expressed understanding.     - Discussed:     - Discussed with:  Patient            Postoperative Care            Comments:    Other Comments: The risks and benefits of anesthesia, and the alternatives where applicable, have been discussed with the patient, and they wish to proceed.          neg OB ROS.      Lukas Darnell, APRN CRNA    I have reviewed the pertinent notes and labs in the chart from the past 30 days and (re)examined the patient.  Any updates or changes from those notes are reflected in this note.                  "

## 2024-09-22 NOTE — PROGRESS NOTES
Doing well. Just starting to feel her ctx but not really strong yet.   Is on 8 mU/min of pitocin.   No concerns.   Has been up walking and using labor ball.     O:    Vitals:    24 0514 24 1352   BP: 117/73 127/60   Resp: 18    Temp: 98.3  F (36.8  C) 98.3  F (36.8  C)   TempSrc: Oral Oral   SpO2: 98%         Vitals noted.  Patient alert, oriented, and in no acute distress. Currently resting in bed.    with good variability. One variable, no recurrent decels.   Ctx now picking up about every 2-3 minutes. Previously not picking up on monitor.     Will continue to titrate pitocin as needed. Discussed internal monitors if needed. At this time will continue with external toco and EKG.     Ambulate, follow for progress.   Anticipate  today.   Continue on PCN for GBS prophylaxis.     Celena Dorado MD

## 2024-09-22 NOTE — LETTER
September 23, 2024      To Whom It May Concern:      Lakeshia Phillips was seen in our facility, 09/23/24.  Her daughter, Zeinab Adam was unable to attend school today or tomorrow related to her mother's hospitalization.  Please excuse her for these days.    Sincerely,        Heraclio Villagomez MD

## 2024-09-22 NOTE — PROGRESS NOTES
Data: Patient presented to [facility] on  @ 0500.  Patient admitted for SROM and labor. Patient is a .  Prenatal record reviewed. Pregnancy has been uncomplicated.  Gestational Age 38w6d. VSS. Fetal movement present. Patient reports regular uterine contractions, denies leaking of vaginal fluid/rupture of membranes, vaginal bleeding, abdominal pain, epigastric or URQ pain. Support person Jon is present.   Action: Verbal consent for EFM.  Admission assessment completed. Bill of rights reviewed.  Response: Patient verbalized agreement with plan. Dr. Dorado updated via phone and gave admission orders.

## 2024-09-22 NOTE — PROGRESS NOTES
Patient resting comfortably in room, moderate pain with contractions. Indicated that she would like to use pain relief (nitrous, fentanyl, epidural) but holding off right now. Encouraged patient to ambulate halls for a little bit.

## 2024-09-22 NOTE — PROGRESS NOTES
Patient denies increasing painful contractions after ambulating in halls, patient declined start of Pitocin at this time. Would like to eat lunch first and will consider starting after that. Afebrile. Cat 1 strip.

## 2024-09-22 NOTE — H&P
"  2024    Lakeshia Phillips  [id number]        OB Admit History & Physical      Ms. Phillips  is here with SROM.    She has noticed several gushes of fluid this am and roxana about every 2-3 minutes.     Patient's last menstrual period was 2023.   Her Estimated Date of Delivery: Sep 30, 2024, making her 38w6d  wks.      Estimated body mass index is 34.54 kg/m  as calculated from the following:    Height as of 24: 1.6 m (5' 3\").    Weight as of 24: 88.5 kg (195 lb).  Her prenatal course has been complicated by positive maternal antibody screen, blood type A+ with anti-c and anti Jk(a) in low titers. This was monitored and based on recent titers felt to be low risk for HDFN. Had level 2 ultrasound and NIPT screening which was all normal. Growth ultrasounds have been normal and due to low titers, additional fetal surveillance has not been warranted.   She also had some early vaginal bleeding/threatened miscarriage but that has resolved.     See prenatal for labs.  positive GBBS, Rubella Immune, RH positive. Failed 1 hour GTT, passed 3 hour GTT.     Estimated fetal weight= 7.7 lbs      She is a 24 year old   Her OB history:   OB History    Para Term  AB Living   2 1 1 0 0 1   SAB IAB Ectopic Multiple Live Births   0 0 0 0 0      # Outcome Date GA Lbr Jose/2nd Weight Sex Type Anes PTL Lv   2 Current            1 Term 18 39w0d  2.438 kg (5 lb 6 oz) F Vag-Spont   NAMITA      Name: Zeinab      Obstetric Comments   EDC 2024 based on LMP.  Parenting with Jon.  This will be their first delivery at [facility]            Past Medical History:   Diagnosis Date    History of anxiety     as a teenager    History of depression     as a teenager          Past Surgical History:   Procedure Laterality Date    APPENDECTOMY  2002    TONSILLECTOMY      WISDOM TOOTH EXTRACTION           No current outpatient medications on file.       Allergies: Blood transfusion related (informational " only) and Tramadol      REVIEW OF SYSTEMS:  NEUROLOGIC:  Negative  EYES:  Negative  ENT:  Negative  GI:  Negative  BREAST:  Negative  :  Negative  GYN:  Negative  CV:  Negative  PULMONARY:  Negative  MUSCULOSKELETAL:  Negative  PSYCH:  Negative        Social History     Socioeconomic History    Marital status: Single     Spouse name: Not on file    Number of children: Not on file    Years of education: Not on file    Highest education level: Not on file   Occupational History    Not on file   Tobacco Use    Smoking status: Former     Types: Cigarettes    Smokeless tobacco: Never   Vaping Use    Vaping status: Former    Substances: Nicotine   Substance and Sexual Activity    Alcohol use: Not Currently    Drug use: Not Currently     Types: Marijuana    Sexual activity: Yes     Partners: Male     Birth control/protection: I.U.D.   Other Topics Concern    Not on file   Social History Narrative    2/2024  Lives in [place] with S.O., Jon and daughter, Zeinab.       Social Determinants of Health     Financial Resource Strain: Low Risk  (9/22/2024)    Financial Resource Strain     Within the past 12 months, have you or your family members you live with been unable to get utilities (heat, electricity) when it was really needed?: No   Food Insecurity: Low Risk  (9/22/2024)    Food Insecurity     Within the past 12 months, did you worry that your food would run out before you got money to buy more?: No     Within the past 12 months, did the food you bought just not last and you didn t have money to get more?: No   Transportation Needs: Low Risk  (9/22/2024)    Transportation Needs     Within the past 12 months, has lack of transportation kept you from medical appointments, getting your medicines, non-medical meetings or appointments, work, or from getting things that you need?: No   Physical Activity: Not on file   Stress: Not on file   Social Connections: Not on file   Interpersonal Safety: Low Risk  (9/22/2024)     Interpersonal Safety     Do you feel physically and emotionally safe where you currently live?: Yes     Within the past 12 months, have you been hit, slapped, kicked or otherwise physically hurt by someone?: No     Within the past 12 months, have you been humiliated or emotionally abused in other ways by your partner or ex-partner?: No   Housing Stability: High Risk (9/22/2024)    Housing Stability     Do you have housing? : No     Are you worried about losing your housing?: No      Family History   Problem Relation Age of Onset    Miscarriages / Stillbirths Mother     Seizure Disorder Mother     Unknown/Adopted Father     Diabetes Maternal Grandmother     Dementia Maternal Grandmother     Unknown/Adopted Maternal Grandfather     Unknown/Adopted Paternal Grandmother     Unknown/Adopted Paternal Grandfather     No Known Problems Daughter     Unknown/Adopted Half-Sister     Attention Deficit Disorder Half-Brother     No Known Problems Half-Brother     No Known Problems Half-Brother        Vitals:   FHT 130s-140s with good variability and accels, no decels seen. No regular contractions seen.    Alert Awake in NAD  HEENT grossly normal  Neck: no lymphadenopathy or thryoidomegaly  Lungs clear to auscultation  Back no spinal or CVAT  Heart RRR without murmur  ABD gravid, cephalic on exam  Pelvic:  large amount of clear fluid noted, no blood noted  Cervix is 3 cm / 20 % effaced at -4 station  EXT:  no edema or calf tenderness  Neuro:  intact    Assessment:  IUP at 38w6d  with SROM and early labor.   + maternal antibody screen with anti-c and anti-Jk(a)    Plan:  Contractions have now spaced out to where she is not feeling them. Will have her walk around now that antibiotics have been on board for adequate prophylaxis. If no regular contractions within next 2 hours, will discuss starting pitocin.     Celena Dorado MD  Dept of Family Medicine  September 22, 2024

## 2024-09-23 PROBLEM — O09.893 SUPERVISION OF OTHER HIGH RISK PREGNANCIES, THIRD TRIMESTER: Status: ACTIVE | Noted: 2024-09-23

## 2024-09-23 PROBLEM — R76.8 RED BLOOD CELL ANTIBODY POSITIVE: Status: ACTIVE | Noted: 2024-09-23

## 2024-09-23 LAB
BLOOD BANK CHART COMMENT: NORMAL
HGB BLD-MCNC: 10.8 G/DL (ref 11.7–15.7)
HOLD SPECIMEN: NORMAL
Lab: NORMAL
PERFORMING LABORATORY: NORMAL
SPECIMEN EXPIRATION DATE: NORMAL
SPECIMEN STATUS: NORMAL
T PALLIDUM AB SER QL: NONREACTIVE
TEST NAME: NORMAL

## 2024-09-23 PROCEDURE — 250N000011 HC RX IP 250 OP 636: Performed by: FAMILY MEDICINE

## 2024-09-23 PROCEDURE — 250N000013 HC RX MED GY IP 250 OP 250 PS 637: Performed by: FAMILY MEDICINE

## 2024-09-23 PROCEDURE — 36415 COLL VENOUS BLD VENIPUNCTURE: CPT | Performed by: FAMILY MEDICINE

## 2024-09-23 PROCEDURE — 120N000001 HC R&B MED SURG/OB

## 2024-09-23 PROCEDURE — 59400 OBSTETRICAL CARE: CPT | Performed by: FAMILY MEDICINE

## 2024-09-23 PROCEDURE — 86880 COOMBS TEST DIRECT: CPT | Performed by: FAMILY MEDICINE

## 2024-09-23 PROCEDURE — 10907ZC DRAINAGE OF AMNIOTIC FLUID, THERAPEUTIC FROM PRODUCTS OF CONCEPTION, VIA NATURAL OR ARTIFICIAL OPENING: ICD-10-PCS | Performed by: FAMILY MEDICINE

## 2024-09-23 PROCEDURE — 86900 BLOOD TYPING SEROLOGIC ABO: CPT | Performed by: FAMILY MEDICINE

## 2024-09-23 PROCEDURE — 86901 BLOOD TYPING SEROLOGIC RH(D): CPT | Performed by: FAMILY MEDICINE

## 2024-09-23 PROCEDURE — 86870 RBC ANTIBODY IDENTIFICATION: CPT | Performed by: FAMILY MEDICINE

## 2024-09-23 PROCEDURE — 81403 MOPATH PROCEDURE LEVEL 4: CPT | Performed by: FAMILY MEDICINE

## 2024-09-23 PROCEDURE — 85018 HEMOGLOBIN: CPT | Performed by: FAMILY MEDICINE

## 2024-09-23 PROCEDURE — 722N000001 HC LABOR CARE VAGINAL DELIVERY SINGLE

## 2024-09-23 RX ORDER — DOCUSATE SODIUM 100 MG/1
100 CAPSULE, LIQUID FILLED ORAL DAILY
Status: DISCONTINUED | OUTPATIENT
Start: 2024-09-23 | End: 2024-09-24 | Stop reason: HOSPADM

## 2024-09-23 RX ORDER — PRENATAL VIT/IRON FUM/FOLIC AC 27MG-0.8MG
1 TABLET ORAL DAILY
Status: DISCONTINUED | OUTPATIENT
Start: 2024-09-23 | End: 2024-09-24 | Stop reason: HOSPADM

## 2024-09-23 RX ORDER — CARBOPROST TROMETHAMINE 250 UG/ML
250 INJECTION, SOLUTION INTRAMUSCULAR
Status: DISCONTINUED | OUTPATIENT
Start: 2024-09-23 | End: 2024-09-24 | Stop reason: HOSPADM

## 2024-09-23 RX ORDER — OXYTOCIN/0.9 % SODIUM CHLORIDE 30/500 ML
340 PLASTIC BAG, INJECTION (ML) INTRAVENOUS CONTINUOUS PRN
Status: DISCONTINUED | OUTPATIENT
Start: 2024-09-23 | End: 2024-09-24 | Stop reason: HOSPADM

## 2024-09-23 RX ORDER — OXYTOCIN 10 [USP'U]/ML
10 INJECTION, SOLUTION INTRAMUSCULAR; INTRAVENOUS
Status: DISCONTINUED | OUTPATIENT
Start: 2024-09-23 | End: 2024-09-24 | Stop reason: HOSPADM

## 2024-09-23 RX ORDER — LOPERAMIDE HCL 2 MG
2 CAPSULE ORAL
Status: DISCONTINUED | OUTPATIENT
Start: 2024-09-23 | End: 2024-09-24 | Stop reason: HOSPADM

## 2024-09-23 RX ORDER — FERROUS SULFATE 325(65) MG
325 TABLET ORAL EVERY OTHER DAY
Status: DISCONTINUED | OUTPATIENT
Start: 2024-09-23 | End: 2024-09-24 | Stop reason: HOSPADM

## 2024-09-23 RX ORDER — TRANEXAMIC ACID 10 MG/ML
1 INJECTION, SOLUTION INTRAVENOUS EVERY 30 MIN PRN
Status: DISCONTINUED | OUTPATIENT
Start: 2024-09-23 | End: 2024-09-24 | Stop reason: HOSPADM

## 2024-09-23 RX ORDER — MISOPROSTOL 200 UG/1
400 TABLET ORAL
Status: DISCONTINUED | OUTPATIENT
Start: 2024-09-23 | End: 2024-09-24 | Stop reason: HOSPADM

## 2024-09-23 RX ORDER — METHYLERGONOVINE MALEATE 0.2 MG/ML
200 INJECTION INTRAVENOUS
Status: DISCONTINUED | OUTPATIENT
Start: 2024-09-23 | End: 2024-09-24 | Stop reason: HOSPADM

## 2024-09-23 RX ORDER — BISACODYL 10 MG
10 SUPPOSITORY, RECTAL RECTAL DAILY PRN
Status: DISCONTINUED | OUTPATIENT
Start: 2024-09-23 | End: 2024-09-24 | Stop reason: HOSPADM

## 2024-09-23 RX ORDER — ACETAMINOPHEN 325 MG/1
650 TABLET ORAL EVERY 4 HOURS PRN
Status: DISCONTINUED | OUTPATIENT
Start: 2024-09-23 | End: 2024-09-24 | Stop reason: HOSPADM

## 2024-09-23 RX ORDER — IBUPROFEN 800 MG/1
800 TABLET, FILM COATED ORAL EVERY 6 HOURS PRN
Status: DISCONTINUED | OUTPATIENT
Start: 2024-09-23 | End: 2024-09-24 | Stop reason: HOSPADM

## 2024-09-23 RX ORDER — LOPERAMIDE HCL 2 MG
4 CAPSULE ORAL
Status: DISCONTINUED | OUTPATIENT
Start: 2024-09-23 | End: 2024-09-24 | Stop reason: HOSPADM

## 2024-09-23 RX ORDER — MODIFIED LANOLIN
OINTMENT (GRAM) TOPICAL
Status: DISCONTINUED | OUTPATIENT
Start: 2024-09-23 | End: 2024-09-24 | Stop reason: HOSPADM

## 2024-09-23 RX ADMIN — IBUPROFEN 800 MG: 800 TABLET, FILM COATED ORAL at 09:28

## 2024-09-23 RX ADMIN — DOCUSATE SODIUM 100 MG: 100 CAPSULE, LIQUID FILLED ORAL at 09:21

## 2024-09-23 RX ADMIN — ACETAMINOPHEN 650 MG: 325 TABLET ORAL at 05:52

## 2024-09-23 RX ADMIN — PRENATAL VIT W/ FE FUMARATE-FA TAB 27-0.8 MG 1 TABLET: 27-0.8 TAB at 09:21

## 2024-09-23 RX ADMIN — ACETAMINOPHEN 650 MG: 325 TABLET ORAL at 01:45

## 2024-09-23 RX ADMIN — FERROUS SULFATE TAB 325 MG (65 MG ELEMENTAL FE) 325 MG: 325 (65 FE) TAB at 09:21

## 2024-09-23 RX ADMIN — BENZOCAINE AND LEVOMENTHOL: 200; 5 SPRAY TOPICAL at 01:48

## 2024-09-23 RX ADMIN — IBUPROFEN 800 MG: 800 TABLET, FILM COATED ORAL at 15:56

## 2024-09-23 RX ADMIN — OXYTOCIN 10 UNITS: 10 INJECTION INTRAVENOUS at 00:19

## 2024-09-23 RX ADMIN — IBUPROFEN 800 MG: 800 TABLET, FILM COATED ORAL at 01:45

## 2024-09-23 RX ADMIN — IBUPROFEN 800 MG: 800 TABLET, FILM COATED ORAL at 22:30

## 2024-09-23 ASSESSMENT — ACTIVITIES OF DAILY LIVING (ADL)
ADLS_ACUITY_SCORE: 26
ADLS_ACUITY_SCORE: 26
ADLS_ACUITY_SCORE: 29
ADLS_ACUITY_SCORE: 26
ADLS_ACUITY_SCORE: 28
ADLS_ACUITY_SCORE: 25
ADLS_ACUITY_SCORE: 25
ADLS_ACUITY_SCORE: 28
ADLS_ACUITY_SCORE: 24
ADLS_ACUITY_SCORE: 29
ADLS_ACUITY_SCORE: 25
ADLS_ACUITY_SCORE: 29
ADLS_ACUITY_SCORE: 25
ADLS_ACUITY_SCORE: 25
ADLS_ACUITY_SCORE: 28
ADLS_ACUITY_SCORE: 29
ADLS_ACUITY_SCORE: 24
ADLS_ACUITY_SCORE: 28
ADLS_ACUITY_SCORE: 25
ADLS_ACUITY_SCORE: 28
ADLS_ACUITY_SCORE: 25

## 2024-09-23 NOTE — PLAN OF CARE
Goal Outcome Evaluation:    S: Shift review   B:Lakeshia is a  who delivered vaginally on  @ 0009.   A: VSS, 448g blood loss from delivery to 2hr QBL. Flow now light, few very small clots. Fundus remains firm, midline, 1 above umbilicus. Perineum swollen, intact. Patient utilizing PRN tylenol and ibuprofen, as well as ice packs, tucks, and dermoplast spray. Reports effective relief. Patient is independent with mobility, tolerating now without any issues. Flatus passing, tolerating regular diet. Handles baby with confidence. Breastfeeding with minimal assistance achieving adequate latches.   R: Continue with routine PP care

## 2024-09-23 NOTE — PROGRESS NOTES
Patient feeling shaky. Observed early and late decelerations. Patient repositioned from back to side lying. Pitocin augmentation stopped, MD aware and okay with that.

## 2024-09-23 NOTE — PROGRESS NOTES
Doing well. She feels pressure only now, no pain.   Cervix now 5/95%/-2 which is a nice change in station. Moderate pressure palpated with ctx.    with good variability, accels present, no decels. Ctx now q 3-4 minutes.   Will resume lower dose pitocin and continue active management with position changes. Anticipate  tonight/early tomorrow.   Celena Dorado MD

## 2024-09-23 NOTE — ANESTHESIA PROCEDURE NOTES
"Epidural catheter Procedure Note    Pre-Procedure   Staff -        Performed By: CRNA       Location: OB       Pre-Anesthestic Checklist: patient identified, IV checked, risks and benefits discussed, informed consent, monitors and equipment checked, pre-op evaluation and at physician/surgeon's request  Timeout:       Correct Patient: Yes        Correct Procedure: Yes        Correct Site: Yes        Correct Position: Yes   Procedure Documentation  Procedure: epidural catheter       Patient Position: sitting       Patient Prep/Sterile Barriers: sterile gloves, mask, patient draped       Skin prep: Betadine       Local skin infiltrated with 3 mL of 1% lidocaine.        Insertion Site: L3-4. (midline approach).       Technique: LORT air        Needle Type: Toced needle and Lopez       Needle Gauge: 17.        Needle Length (Inches): 3.5        Catheter: 20 G.          Catheter threaded easily.         4 cm epidural space.           # of attempts: 1 and  # of redirects:  0    Assessment/Narrative         Paresthesias: No.       Test dose of 3 mL lidocaine 1.5% w/ 1:200,000 epinephrine at.         Test dose negative, 3 minutes after injection, for signs of intravascular, subdural, or intrathecal injection.       Insertion/Infusion Method: LORT air       Aspiration negative for Heme or CSF via Epidural Catheter.       Sensory Level Left: T6.       Sensory Level Right: T6.     Comments:  Pt appeared to tolerate the procedure well.  She denied any paresthesias throughout the procedure.  She had a negative reaction to the test dose for intrathecal, subdural or intravascular injection.  She had a normal physiological response to the loading dose.  I will be available if further intervention is required.        FOR [facility] ([facility]) ONLY:   Pain Team Contact information: please page the Pain Team Via [employer]. Search \"Pain\". During daytime hours, please page the attending first. At night please page the resident first.      "

## 2024-09-23 NOTE — PROGRESS NOTES
Dr. Dorado gave verbal order to restart pitocin augmentation due to ctx spacing. Monitors adjusted to get pattern and then will resume.

## 2024-09-23 NOTE — PROGRESS NOTES
Doing well, just had epidural place. Pitocin was stopped due to hyperstimulation of the uterus, she was feeling ctx back to back.   Vitals:    09/22/24 1927 09/22/24 1929 09/22/24 1931 09/22/24 1933   BP: 117/77 112/72 119/74 121/74   BP Location:       Patient Position:       Cuff Size:       Pulse:       Resp:       Temp:       TempSrc:       SpO2:   99%       Vitals noted.  Patient alert, oriented, and in no acute distress.   FHT 130s with minimal variability, just starting to increase. Previously good variability with accels. Ctx q 2-3 minutes.   Last cervix check prior to epidural was 4 cm dilated.     Will recheck her in 15-30 minutes once very comfortable with epidural and see if her labor continues without pitocin or decide if need to resume that with internals.   Celena Dorado MD

## 2024-09-23 NOTE — PROGRESS NOTES
Dr. Dorado gave verbal order to stop titrating pitocin, adequate cervical change has been made. Pitocin remaining at 4mu/min.

## 2024-09-23 NOTE — PLAN OF CARE
Goal Outcome Evaluation:      Plan of Care Reviewed With: patient, significant other    Overall Patient Progress: improvingOverall Patient Progress: improving    Outcome Evaluation: VSS. Pain control adequate with epidural. Made excellent cervical progress. No pitocin currently infusing with MD approval- patient would like to deliver after midnight if possible. Room dim, encouraged calm environment and rest promoted. FHT stable, accels continue to be present. Anticipate  soon.

## 2024-09-23 NOTE — PROGRESS NOTES
S: Delivery  B: spontaneous Labor,  @ [id number] gestation, GBS positive with antibiotic treatment less than 4 hours prior to delivery.  A: Patient delivered Vaginal at 0009 with Dr. Dorado in attendance. Baby delivered and placed on mother's low abdomen for delayed cord clamping where baby was dried and stimulated. After cord clamped and cut, baby was placed skin to skin on mother's chest within 5 minutes following delivery . Apgars 9/9. Placenta was delivered @ 0019 followed by administration of oxytocin. Bonding initiated with mom and baby. Educated mother on importance of exclusive breast feeding, expected feeding readiness cues and encouraged her to observe for feeding cues. Mother informed that breast feeding assistance would be provided. See flowsheet for VS and PP checks. Labor care plan goals met.  R: Expect routine postpartum care. Anticipate first feeding within the hour.

## 2024-09-23 NOTE — PROGRESS NOTES
S: Patient desires Epidural  B: Patient is a  who presented for SROM labor, She is now 4.5 dialated, FHT's 150  A: Lukas JOHNSON called and in room at 1850. Patient and procedure correctly identified/verified with CRNA. Consent signed. 1 fluid bolus given. Patient in position for epidural placement. Epidural placed without complications. Test dose/bolus given by CRNA and patient tolerated well.  R: Will continue to monitor.

## 2024-09-23 NOTE — PROGRESS NOTES
Received bedside report from Brittney KANG. Taking over 1915. BP cycle started following epidural. RN providing counter hip pressure during ctx, pain relief improving. Normal HR and BP right now. Spouse and mother bedside and supportive.

## 2024-09-23 NOTE — L&D DELIVERY NOTE
Delivery Summary    Lakeshia Phillips MRN# [id number]   Age: 24 year old YOB: 2000     ASSESSMENT & PLAN: Lakeshia Phillips is a 24 year old  female who presents at 39w0d weeks with SROM.      Her prenatal course was complicated by early bleeding/threatened miscarriage that resolved, chlamydia treated prior to pregnancy, and +maternal antibody screen in low titers, with no clinical sequela.  She received her prenatal care here in [place] with consultation from [facility].      Her routine prenatal labs were as follows:   A POS   antibody screen positive for anti-c and anti-Jk(a)  Rubella immune   Trepab negative  HepBsAg negative  HIV negative  She failed her 1 hour GTT. Passed her 3 hour GTT  Her GBS is positive    On arrival at the birthplace her cervical exam is as follows:    3/30%/posterior/high    She was allowed to labor spontaneously initially and received PNC for GBS.   She eventually had pitocin augmentation of labor due to infrequent contraction pattern.   She received epidural with good relief of her labor pain.   She had SROM at 340 prior to arrival.  She labored well and was completely dilated at 23:49.  First stage of labor was 1 hrs 37 min.     She pushed very well with  viable male infant over an intact perineum at 00:09.  The infant was then laid on mother's abdomen, received drying and tactile stimulation.  The cord was delayed clamped x 2 and cut by the FOB who was in attendance and assisting with delivery at his request with mom's permission.  There was spontaneous cry and APGARS at 1 and 5 minutes were 9 and 9 respectively.   Second stage of labor was 0 hrs and 20 minutes.     The placenta delivered spontaneously, intact with a 3VC at 00:19.  Pitocin was given IM after delivery of the placenta.  Third stage of labor was 10 minutes.     Examination of the vaginal walls and perineum revealed no lacerations.  Fundal massage proved that the uterine fundus was firm.  EBL was 50  ml.      A:   viable male infant at 39 weeks gestation with APGARS of 9 and 9 weighing 7 lbs 2 oz.    P:  Mom, dad and baby are doing well after delivery.  Mom plans to breastfeed baby and baby will be rooming in with mom.  Parents desire circumcision for the baby.  I anticipate they will both have a normal course.      Celena Dorado MD         Alan, Male-Lakeshia [[id number]]      Labor Event Times      Latent labor onset date/time: 2024 034    Active labor onset date: 24 Onset time: 10:12 PM   Dilation complete date: 24 Complete time: 11:49 PM   Start pushing date/time: 2024 0000          Labor Length      1st Stage (hrs): 1 (min): 37   2nd Stage (hrs): 0 (min): 20   3rd Stage (hrs): 0 (min): 10          Labor Events     labor?: No   steroids: None  Labor Type: Spontaneous  Predominate monitoring during 1st stage: continuous electronic fetal monitoring     Antibiotics received during labor?: Yes  Reason for Antibiotics: GBS  Antibiotics received for GBS: Penicillin  Antibiotics Given (GBS): Greater than 4 hours prior to delivery     Rupture identifier: Sac 1  Rupture date/time: 24 0340   Rupture type: Spontaneous Rupture of Membranes  Fluid color: Clear  Fluid odor: Normal     Augmentation: AROM  Indications for augmentation: Ineffective Contraction Pattern       Delivery/Placenta Date and Time      Delivery Date: 24 Delivery Time: 12:09 AM   Placenta Date/Time: 2024 12:19 AM  Oxytocin given at the time of delivery: after delivery of placenta  Delivering clinician: Celena Dorado MD   Other personnel present at delivery:  Provider Role   Myriam Staley RN Eisenschenk, Jessica, RN              Vaginal Counts       Initial count performed by 2 team members:  Two Team Members   dr estella staley RN         Needles Suture Needles Sponges (RETIRED) Instruments   Initial counts 2  5    Added to count 0  0    Relief counts      "  Final counts 2  5            Placed during labor Accounted for at the end of labor   FSE No NA   IUPC No NA   Cervidil No NA                  Final count performed by 2 team members:  Two Team Members   dr connor staley RN      Final count correct?: Yes  Pre-Birth Team Brief: Complete  Post-Birth Team Debrief: Complete       Apgars    Living status: Living   1 Minute 5 Minute 10 Minute 15 Minute 20 Minute   Skin color: 1  1       Heart rate: 2  2       Reflex irritability: 2  2       Muscle tone: 2  2       Respiratory effort: 2  2       Total: 9  9       Apgars assigned by: ALANNAH OCONNOR RN       Cord      Vessels: 3 Vessels    Cord Complications: None               Cord Blood Disposition: Lab    Gases Sent?: No    Delayed cord clamping?: Yes    Cord Clamping Delay (seconds): >120 seconds    Stem cell collection?: No           [place] Resuscitation    Methods: None       [place] Measurements      Weight: 7 lb 1.9 oz Length: 1' 9\"     Head circumference: 33 cm Chest circumference: 31.8 cm          Skin to Skin and Feeding Plan      Skin to skin initiation date/time: 1841    Skin to skin with: Mother  Skin to skin end date/time: 1841           Labor Events and Shoulder Dystocia    Fetal Tracing Prior to Delivery: Category 2  Shoulder dystocia present?: Neg       Delivery (Maternal) (Provider to Complete) (997251)    Episiotomy: None  Perineal lacerations: None    Repair suture: None  Genital tract inspection done: Pos       Blood Loss  Mother: Lakeshia Phillips #[id number]     Start of Mother's Information      Delivery Blood Loss  24 2212 - 24 0109      Delivery QBL (mL) Hospital Encounter 150 mL    Total  150 mL               End of Mother's Information  Mother: Lakeshia Phillips #[id number]                Delivery - Provider to Complete ([id number])    Delivering clinician: Celena Dorado MD  Delivery Type (Choose the 1 that will go to the Birth History): Vaginal, " Spontaneous                         Other personnel:  Provider Role   Myriam Wright, Lilia Sy RN                     Placenta    Date/Time: 9/23/2024 12:19 AM  Removal: Spontaneous  Disposition: Patient possesion             Anesthesia    Method: Epidural  Cervical dilation at placement: 4-7                    Presentation and Position    Presentation: Vertex    Position: Left Occiput Anterior                     Celena Dorado MD

## 2024-09-23 NOTE — PROGRESS NOTES
"S: Shift review   B:Lakeshia is a  who delivered vaginally on 2024  A: VSS, patient is independent with mobility, pain well controlled with p.o. pain meds. Small \"scratches\" on perinuem that burn some with urination.  Handles baby with confidence.Breastfeeding going well.   R: Continue with routine PP care  "

## 2024-09-23 NOTE — TELEPHONE ENCOUNTER
Patient contacted and she is currently on post-partum unit. Baby was delivered.     Ev Lozano RN on 9/23/2024 at 10:12 AM

## 2024-09-23 NOTE — PROGRESS NOTES
S: Transfer to postpartum  B: Vaginal birth @ 0009, no tear, no repair, breast feeding    A: Mother and baby transferred to postpartum unit at 0300 via wheelchair after completion of immediate recovery period. Showered in delivery room after ambulation to bathroom. First void adequate at 2hr post delivery shilpi. Patient moved to postpartum room and oriented to room. Mother and baby bonding well and in satisfactory condition upon transfer.  R: Anticipate routine postpartum care.

## 2024-09-23 NOTE — PROGRESS NOTES
Doing well. Still very comfortable. Having straight cath for 300 ml clear yellow urine with 1 small amount of blood tinged urine. Currently on pitocin at 4 mU/min.     O: /60   Pulse 81   Temp 97.8  F (36.6  C) (Oral)   Resp 15   LMP 12/25/2023   SpO2 99% .   Vitals noted.  Patient alert, oriented, and in no acute distress.   FHT 140s with good variability, accels, few early decels.   Ctx q 3 minutes.   SVE 9 cm/0 station    Will continue to monitor. She is hoping to deliver at or after midnight. Advised it will be close. Will allow her to rest as needed and recheck cervix in next hour, as long as fetal status tolerates waiting. Dad is hoping to assist with delivery if all is going well.   Celena Dorado MD

## 2024-09-24 VITALS
RESPIRATION RATE: 16 BRPM | DIASTOLIC BLOOD PRESSURE: 58 MMHG | TEMPERATURE: 98 F | OXYGEN SATURATION: 98 % | SYSTOLIC BLOOD PRESSURE: 117 MMHG | HEART RATE: 97 BPM

## 2024-09-24 PROCEDURE — 250N000013 HC RX MED GY IP 250 OP 250 PS 637: Performed by: FAMILY MEDICINE

## 2024-09-24 RX ORDER — DOCUSATE SODIUM 100 MG/1
100 CAPSULE, LIQUID FILLED ORAL DAILY
Qty: 60 CAPSULE | Refills: 1 | Status: SHIPPED | OUTPATIENT
Start: 2024-09-24

## 2024-09-24 RX ORDER — NALOXONE HYDROCHLORIDE 0.4 MG/ML
0.4 INJECTION, SOLUTION INTRAMUSCULAR; INTRAVENOUS; SUBCUTANEOUS
Status: DISCONTINUED | OUTPATIENT
Start: 2024-09-24 | End: 2024-09-24 | Stop reason: HOSPADM

## 2024-09-24 RX ORDER — IBUPROFEN 800 MG/1
800 TABLET, FILM COATED ORAL EVERY 6 HOURS PRN
Qty: 60 TABLET | Refills: 1 | Status: SHIPPED | OUTPATIENT
Start: 2024-09-24

## 2024-09-24 RX ORDER — NALOXONE HYDROCHLORIDE 0.4 MG/ML
0.2 INJECTION, SOLUTION INTRAMUSCULAR; INTRAVENOUS; SUBCUTANEOUS
Status: DISCONTINUED | OUTPATIENT
Start: 2024-09-24 | End: 2024-09-24 | Stop reason: HOSPADM

## 2024-09-24 RX ORDER — MODIFIED LANOLIN
OINTMENT (GRAM) TOPICAL
Qty: 7 G | Refills: 1 | Status: SHIPPED | OUTPATIENT
Start: 2024-09-24

## 2024-09-24 RX ADMIN — IBUPROFEN 800 MG: 800 TABLET, FILM COATED ORAL at 04:50

## 2024-09-24 RX ADMIN — PRENATAL VIT W/ FE FUMARATE-FA TAB 27-0.8 MG 1 TABLET: 27-0.8 TAB at 10:03

## 2024-09-24 RX ADMIN — DOCUSATE SODIUM 100 MG: 100 CAPSULE, LIQUID FILLED ORAL at 10:03

## 2024-09-24 RX ADMIN — FERROUS SULFATE TAB 325 MG (65 MG ELEMENTAL FE) 325 MG: 325 (65 FE) TAB at 10:03

## 2024-09-24 ASSESSMENT — ACTIVITIES OF DAILY LIVING (ADL)
ADLS_ACUITY_SCORE: 24
ADLS_ACUITY_SCORE: 24
ADLS_ACUITY_SCORE: 25
ADLS_ACUITY_SCORE: 24
ADLS_ACUITY_SCORE: 24
ADLS_ACUITY_SCORE: 25
ADLS_ACUITY_SCORE: 24
ADLS_ACUITY_SCORE: 25
ADLS_ACUITY_SCORE: 24
ADLS_ACUITY_SCORE: 24

## 2024-09-24 NOTE — PLAN OF CARE
Goal Outcome Evaluation:    Shift note    21 hour postpartum. Spontaneous vaginal delivery without complications.    Following pathway. VSS. FF with light rubra lochia, no clots. Uterine cramping well managed with ibuprofen and ice packs to perineum for discomfort. Voiding spontaneously. Up and about without problems. Independent with self and  cares. Confidently latching and nursing.     Continue with normal Vaginal postpartum assessments and pathway. Offer assistance as needed with cares and feedings. Plan for discharge on 24

## 2024-09-24 NOTE — PROGRESS NOTES
S: Discharge  to home with baby.    B: Patient had a Vaginal delivery with no complications. Baby boy Baby's name Ld, breast: . Support person's name Jon.     A: VSS. Bonding well with baby. Breastfeeding well.     R:  Discharge instructions reviewed and questions answered.  Belongings gathered and returned to the patient. Agreed to follow up in 6 weeks or sooner with any question or concerns.     Nursing Discharge Checklist:    Pneumovax screened and given, if appropriate: N/A  Influenza vaccine screened and given, if appropriate: N/A  Staples removed (): N/A  Breast milk returned: N/A  Hydrogel pads sent home:N/A  Birth Certificate Done: YES  Public Health Referral Made: N/A

## 2024-09-24 NOTE — DISCHARGE SUMMARY
[facility] Discharge Summary    Lakeshia Phillips MRN# [id number]   Age: 24 year old YOB: 2000     Date of Admission:  2024  Date of Discharge::  2024  Admitting Physician:  Celena Dorado MD  Discharge Physician:  Jojo Avalos MD     Home clinic: [facility]          Admission Diagnoses:   Normal labor  Positive RBC antibody          Discharge Diagnosis:     Normal spontaneous vaginal delivery  Atypical RBC antibody positive           Procedures:     Procedure(s):        No procedures performed during this admission           Medications Prior to Admission:     Medications Prior to Admission   Medication Sig Dispense Refill Last Dose    ferrous sulfate (FEROSUL) 325 (65 Fe) MG tablet Take 1 tablet (325 mg) by mouth every other day Take with food and orange juice can help with absorption 30 tablet 3     Prenatal Vit-Fe Fumarate-FA (PRENATAL MULTIVITAMIN  PLUS IRON) 27-1 MG TABS Take 1 tablet by mouth daily 30 tablet 4     [DISCONTINUED] pantoprazole (PROTONIX) 20 MG EC tablet Take 1 tablet (20 mg) by mouth daily. Take 30 - 60 minutes before meal. 30 tablet 1              Discharge Medications:     Current Discharge Medication List        START taking these medications    Details   docusate sodium (COLACE) 100 MG capsule Take 1 capsule (100 mg) by mouth daily.  Qty: 60 capsule, Refills: 1    Associated Diagnoses:  (normal spontaneous vaginal delivery)      ibuprofen (ADVIL/MOTRIN) 800 MG tablet Take 1 tablet (800 mg) by mouth every 6 hours as needed for other (cramping).  Qty: 60 tablet, Refills: 1    Associated Diagnoses:  (normal spontaneous vaginal delivery)      lanolin ointment Apply topically every hour as needed for other (sore nipples).  Qty: 7 g, Refills: 1    Associated Diagnoses: Postpartum care and examination of lactating mother;  (normal spontaneous vaginal delivery)           CONTINUE these medications  which have NOT CHANGED    Details   ferrous sulfate (FEROSUL) 325 (65 Fe) MG tablet Take 1 tablet (325 mg) by mouth every other day Take with food and orange juice can help with absorption  Qty: 30 tablet, Refills: 3    Associated Diagnoses: Anemia of pregnancy in third trimester      Prenatal Vit-Fe Fumarate-FA (PRENATAL MULTIVITAMIN  PLUS IRON) 27-1 MG TABS Take 1 tablet by mouth daily  Qty: 30 tablet, Refills: 4    Associated Diagnoses: Normal pregnancy in multigravida in first trimester           STOP taking these medications       pantoprazole (PROTONIX) 20 MG EC tablet Comments:   Reason for Stopping:                     Consultations:   No consultations were requested during this admission          Brief History of Labor:   Patient is a 25 y/o G2 now P2 s/p  at 39w0d            Hospital Course:   The patient's hospital course was unremarkable.  On discharge, her pain was well controlled. Vaginal bleeding is similar to peak menstrual flow.  Voiding without difficulty.  Ambulating well and tolerating a normal diet.  No fever.  Breastfeeding going well.  Infant is stable.  Voiding w/o issue, BM x2 wnl. She was discharged on post-partum day #1.    Post-partum hemoglobin:   Hemoglobin   Date Value Ref Range Status   2024 10.8 (L) 11.7 - 15.7 g/dL Final             Discharge Instructions and Follow-Up:     Discharge diet: Regular   Discharge activity: No heavy lifting for 6 week(s)  No sex for 6 week(s)   Discharge follow-up: Follow up with primary care provider in 6 weeks   Wound care: Drink plenty of fluids           Discharge Disposition:     Discharged to home      Attestation:  I have reviewed today's vital signs, notes, medications, labs and imaging.    Jojo Avalos MD

## 2024-09-24 NOTE — PLAN OF CARE
Goal Outcome Evaluation:    S: Shift review   B:Lakeshia is a  who delivered vaginally on 24 at 0009.  A: VSS, patient is independent with mobility, pain well controlled with p.o. pain meds. Handles baby with confidence. Breast feeding independently. Fundus firm, bleeding light.  R: Continue with routine PP care

## 2024-09-25 ENCOUNTER — PATIENT OUTREACH (OUTPATIENT)
Dept: CARE COORDINATION | Facility: CLINIC | Age: 24
End: 2024-09-25

## 2024-09-25 LAB
SCANNED LAB RESULT: NORMAL
TEST NAME: NORMAL

## 2024-09-25 NOTE — PROGRESS NOTES
"Clinic Care Coordination Contact  Transitions of Care Outreach  Chief Complaint   Patient presents with    Clinic Care Coordination - Post Hospital       Most Recent Admission Date: 2024   Most Recent Admission Diagnosis:      Most Recent Discharge Date: 2024   Most Recent Discharge Diagnosis: Postpartum care and examination of lactating mother - Z39.1   (normal spontaneous vaginal delivery) - O80     Transitions of Care Assessment    Discharge Assessment  How are you doing now that you are home?: \" Doing well \"  How are your symptoms? (Red Flag symptoms escalate to triage hotline per guidelines): Improved  Do you know how to contact your clinic care team if you have future questions or changes to your health status? : Yes  Does the patient have their discharge instructions? : Yes  Does the patient have questions regarding their discharge instructions? : No  Were you started on any new medications or were there changes to any of your previous medications? : Yes  Does the patient have all of their medications?: Yes  Do you have questions regarding any of your medications? : No  Do you have all of your needed medical supplies or equipment (DME)?  (i.e. oxygen tank, CPAP, cane, etc.): Yes    Post-op ([facility] CTA Only)  If the patient had a surgery or procedure, do they have any questions for a nurse?: No         [facility] Explained and offered Care Coordination support to eligible patients: Yes    Patient accepted? No    Follow up Plan     Discharge Follow-Up  Discharge follow up appointment scheduled in alignment with recommended follow up timeframe or Transitions of Risk Category? (Low = within 30 days; Moderate= within 14 days; High= within 7 days): No    No future appointments.    Outpatient Plan as outlined on AVS reviewed with patient.    For any urgent concerns, please contact our 24 hour nurse triage line: [phone] ([phone])       Charu Velazquez MA              "

## 2024-09-28 NOTE — PROGRESS NOTES
Lakeshia Phillips  Gender: female  : 2000  [id number]  [place] [address]  [phone] (home)   Medical Record: [id number]  Primary Care Provider: Tena López [facility]   ?   Discharge Phone Call: Key Words/Key Times     How are you and the baby? Good other than jaundice issues for Ld. A little sore in pelvis but doing well    How are feedings going? Breastfeeding on demand, milk is in. Feeding well    Voiding & Stooling? Green poops, many. Lots of voids    Any questions or concerns? None, on their way to hospital for a bilirubin draw    Follow-up appointment? Infant had a visit with Dr. López already and has been following bilirubin levels.     We want to provide excellent care here at [facility]. Do you have any feedback for us that would help us improve? Everyone was really awesome, very nice. The peanut butter and jelly sandwiches were our favorite.    Didi Toussaint RN on 2024 at 10:10 AM

## 2024-10-14 NOTE — ANESTHESIA POSTPROCEDURE EVALUATION
Patient: Lakeshia Phillips    Procedure: * No procedures listed *       Anesthesia Type:  Epidural    Note:  Disposition: Inpatient   Postop Pain Control: Uneventful            Sign Out: Well controlled pain   PONV: No   Neuro/Psych: Uneventful            Sign Out: Acceptable/Baseline neuro status   Airway/Respiratory: Uneventful            Sign Out: Acceptable/Baseline resp. status   CV/Hemodynamics: Uneventful            Sign Out: Acceptable CV status   Other NRE: NONE   DID A NON-ROUTINE EVENT OCCUR? No    Event details/Postop Comments:  Pt was happy with her anesthesia care.  No complications.  I advised the pt she may have some soreness at the epidural site and this is normal.  However if the soreness continues over a week or if redness is noted around the site to let anesthesia know.  I will follow up with the pt if needed.           Last vitals:  There were no vitals filed for this visit.    Electronically Signed By: STEVIE Price CRNA  October 14, 2024  8:06 AM

## 2024-10-28 ENCOUNTER — MEDICAL CORRESPONDENCE (OUTPATIENT)
Dept: HEALTH INFORMATION MANAGEMENT | Facility: CLINIC | Age: 24
End: 2024-10-28
Payer: COMMERCIAL

## 2025-01-19 ENCOUNTER — HEALTH MAINTENANCE LETTER (OUTPATIENT)
Age: 25
End: 2025-01-19

## 2025-04-23 ENCOUNTER — NURSE TRIAGE (OUTPATIENT)
Dept: FAMILY MEDICINE | Facility: CLINIC | Age: 25
End: 2025-04-23

## 2025-04-23 ENCOUNTER — OFFICE VISIT (OUTPATIENT)
Dept: FAMILY MEDICINE | Facility: CLINIC | Age: 25
End: 2025-04-23
Payer: COMMERCIAL

## 2025-04-23 VITALS
HEIGHT: 63 IN | HEART RATE: 79 BPM | OXYGEN SATURATION: 97 % | TEMPERATURE: 98.4 F | DIASTOLIC BLOOD PRESSURE: 73 MMHG | WEIGHT: 204.2 LBS | SYSTOLIC BLOOD PRESSURE: 109 MMHG | BODY MASS INDEX: 36.18 KG/M2 | RESPIRATION RATE: 12 BRPM

## 2025-04-23 DIAGNOSIS — M79.605 PAIN OF LEFT LOWER EXTREMITY: Primary | ICD-10-CM

## 2025-04-23 DIAGNOSIS — R53.83 OTHER FATIGUE: ICD-10-CM

## 2025-04-23 DIAGNOSIS — E03.9 ACQUIRED HYPOTHYROIDISM: ICD-10-CM

## 2025-04-23 PROBLEM — Z36.89 ENCOUNTER FOR TRIAGE IN PREGNANT PATIENT: Status: RESOLVED | Noted: 2024-09-13 | Resolved: 2025-04-23

## 2025-04-23 PROBLEM — O09.893 SUPERVISION OF OTHER HIGH RISK PREGNANCIES, THIRD TRIMESTER: Status: RESOLVED | Noted: 2024-09-23 | Resolved: 2025-04-23

## 2025-04-23 PROBLEM — A59.00 UROGENITAL INFECTION BY TRICHOMONAS VAGINALIS: Status: RESOLVED | Noted: 2022-04-04 | Resolved: 2025-04-23

## 2025-04-23 PROBLEM — Z97.5 USES INTRAUTERINE DEVICE FOR BIRTH CONTROL: Status: RESOLVED | Noted: 2020-05-04 | Resolved: 2025-04-23

## 2025-04-23 PROBLEM — O20.0 THREATENED ABORTION IN FIRST TRIMESTER: Status: RESOLVED | Noted: 2017-10-04 | Resolved: 2025-04-23

## 2025-04-23 PROBLEM — O26.849 FETAL SIZE INCONSISTENT WITH DATES: Status: RESOLVED | Noted: 2018-03-21 | Resolved: 2025-04-23

## 2025-04-23 PROBLEM — R10.2 VAGINAL PAIN: Status: RESOLVED | Noted: 2024-07-13 | Resolved: 2025-04-23

## 2025-04-23 PROBLEM — O98.319 CHLAMYDIA TRACHOMATIS INFECTION IN PREGNANCY: Status: RESOLVED | Noted: 2017-09-27 | Resolved: 2025-04-23

## 2025-04-23 PROBLEM — A56.8 CHLAMYDIA TRACHOMATIS INFECTION IN PREGNANCY: Status: RESOLVED | Noted: 2017-09-27 | Resolved: 2025-04-23

## 2025-04-23 LAB
ALBUMIN SERPL BCG-MCNC: 4.8 G/DL (ref 3.5–5.2)
ALP SERPL-CCNC: 73 U/L (ref 40–150)
ALT SERPL W P-5'-P-CCNC: 16 U/L (ref 0–50)
ANION GAP SERPL CALCULATED.3IONS-SCNC: 12 MMOL/L (ref 7–15)
AST SERPL W P-5'-P-CCNC: 25 U/L (ref 0–45)
BILIRUB SERPL-MCNC: 0.2 MG/DL
BUN SERPL-MCNC: 8.5 MG/DL (ref 6–20)
CALCIUM SERPL-MCNC: 9.8 MG/DL (ref 8.8–10.4)
CHLORIDE SERPL-SCNC: 104 MMOL/L (ref 98–107)
CREAT SERPL-MCNC: 0.68 MG/DL (ref 0.51–0.95)
D DIMER PPP FEU-MCNC: 0.5 UG/ML FEU (ref 0–0.5)
EGFRCR SERPLBLD CKD-EPI 2021: >90 ML/MIN/1.73M2
ERYTHROCYTE [DISTWIDTH] IN BLOOD BY AUTOMATED COUNT: 12.9 % (ref 10–15)
GLUCOSE SERPL-MCNC: 87 MG/DL (ref 70–99)
HCO3 SERPL-SCNC: 23 MMOL/L (ref 22–29)
HCT VFR BLD AUTO: 33.3 % (ref 35–47)
HGB BLD-MCNC: 11.1 G/DL (ref 11.7–15.7)
IRON BINDING CAPACITY (ROCHE): 231 UG/DL (ref 240–430)
IRON SATN MFR SERPL: 22 % (ref 15–46)
IRON SERPL-MCNC: 50 UG/DL (ref 37–145)
MCH RBC QN AUTO: 31.3 PG (ref 26.5–33)
MCHC RBC AUTO-ENTMCNC: 33.3 G/DL (ref 31.5–36.5)
MCV RBC AUTO: 94 FL (ref 78–100)
PLATELET # BLD AUTO: 324 10E3/UL (ref 150–450)
POTASSIUM SERPL-SCNC: 3.7 MMOL/L (ref 3.4–5.3)
PROT SERPL-MCNC: 7.5 G/DL (ref 6.4–8.3)
RBC # BLD AUTO: 3.55 10E6/UL (ref 3.8–5.2)
SODIUM SERPL-SCNC: 139 MMOL/L (ref 135–145)
T4 FREE SERPL-MCNC: 0.26 NG/DL (ref 0.9–1.7)
TSH SERPL DL<=0.005 MIU/L-ACNC: 60.57 UIU/ML (ref 0.3–4.2)
WBC # BLD AUTO: 9.2 10E3/UL (ref 4–11)

## 2025-04-23 PROCEDURE — 84443 ASSAY THYROID STIM HORMONE: CPT | Performed by: PHYSICIAN ASSISTANT

## 2025-04-23 PROCEDURE — 84439 ASSAY OF FREE THYROXINE: CPT | Performed by: PHYSICIAN ASSISTANT

## 2025-04-23 PROCEDURE — 99214 OFFICE O/P EST MOD 30 MIN: CPT | Performed by: PHYSICIAN ASSISTANT

## 2025-04-23 PROCEDURE — 85379 FIBRIN DEGRADATION QUANT: CPT | Performed by: PHYSICIAN ASSISTANT

## 2025-04-23 PROCEDURE — 83550 IRON BINDING TEST: CPT | Performed by: PHYSICIAN ASSISTANT

## 2025-04-23 PROCEDURE — 83540 ASSAY OF IRON: CPT | Performed by: PHYSICIAN ASSISTANT

## 2025-04-23 PROCEDURE — 36415 COLL VENOUS BLD VENIPUNCTURE: CPT | Performed by: PHYSICIAN ASSISTANT

## 2025-04-23 PROCEDURE — 85027 COMPLETE CBC AUTOMATED: CPT | Performed by: PHYSICIAN ASSISTANT

## 2025-04-23 PROCEDURE — 80053 COMPREHEN METABOLIC PANEL: CPT | Performed by: PHYSICIAN ASSISTANT

## 2025-04-23 RX ORDER — LEVOTHYROXINE SODIUM 50 UG/1
50 TABLET ORAL
Qty: 90 TABLET | Refills: 0 | Status: SHIPPED | OUTPATIENT
Start: 2025-04-23

## 2025-04-23 ASSESSMENT — PAIN SCALES - GENERAL: PAINLEVEL_OUTOF10: MODERATE PAIN (6)

## 2025-04-23 NOTE — TELEPHONE ENCOUNTER
Rn spoke with patient. She is wanting to come in for 1:20 appointment instead. RN scheduled as such.     Dio Chicas RN on 4/23/2025 at 11:17 AM

## 2025-04-23 NOTE — TELEPHONE ENCOUNTER
Nurse Triage SBAR    Is this a 2nd Level Triage? YES, LICENSED PRACTITIONER REVIEW IS REQUIRED    Situation: patient has pain in her left leg that started in her foot yesterday. Pain is constant. Current pain is 6 out of 10.    Background: no known injury to her leg.    Assessment: no swelling in her leg. Painful to the touch. No bruises. No redness.the area is warmer than the rest of the leg. No difficulty breathing. No chest pain. She states it is more painful when she walks.  No chance of pregnancy.    Protocol Recommended Disposition:   Call ADS/Go to ED/UCC Now (Or To Office with PCP Approval)    Recommendation: per protocol patient advised to be seen today.  Please advise if patient can be worked in today.    Routed to provider    Perlita Rider RN on 4/23/2025 at 10:00 AM      Does the patient meet one of the following criteria for ADS visit consideration? 16+ years old, with an [facility] PCP     TIP  Providers, please consider if this condition is appropriate for management at one of our Acute and Diagnostic Services sites.     If patient is a good candidate, please use dotphrase <dot>triageresponse and select Refer to ADS to document.    Reason for Disposition   Thigh or calf pain in only one leg and present > 1 hour    Additional Information   Negative: Looks like a broken bone or dislocated joint (e.g., crooked or deformed)   Negative: Sounds like a life-threatening emergency to the triager   Negative: Followed a hip injury   Negative: Followed a knee injury   Negative: Followed an ankle injury   Negative: Back pain radiating (shooting) into leg(s)   Negative: Foot pain is main symptom   Negative: Ankle pain is main symptom   Negative: Knee pain is main symptom   Negative: Leg swelling is main symptom   Negative: Chest pain   Negative: Difficulty breathing   Negative: Entire foot is cool or blue in comparison to other side   Negative: Unable to walk   Negative: Fever and red area (or area very tender to  touch)   Negative: Swollen joint and fever    Protocols used: Leg Pain-A-[place]

## 2025-04-23 NOTE — PROGRESS NOTES
"    Tierra Asher is a 25 year old, presenting for the following health issues:  Musculoskeletal Problem        4/23/2025     1:02 PM   Additional Questions   Roomed by Miky     History of Present Illness       Reason for visit:  Leg pain       ***      Review of Systems  Constitutional, neuro, ENT, endocrine, pulmonary, cardiac, gastrointestinal, genitourinary, musculoskeletal, integument and psychiatric systems are negative, except as otherwise noted.      Objective    /73   Pulse 79   Temp 98.4  F (36.9  C) (Temporal)   Resp 12   Ht 1.6 m (5' 3\")   Wt 92.6 kg (204 lb 3.2 oz)   SpO2 97%   BMI 36.17 kg/m    Body mass index is 36.17 kg/m .  Physical Exam   {Exam List (Optional):181915}    {PROVIDER CHARTING PREFERENCE:803031}        Signed Electronically by: Gely Kelly PA-C  {Email feedback regarding this note to [email or web address]   :[id number]}  " "Discussed should she develop worsening pain, swelling redness or a rash follow-up is warranted. Advised supportive cares at this time.   - D dimer, quantitative; Future  - D dimer, quantitative    Other fatigue  Labs ordered to further evaluate. Treatment pending these results   - CBC with platelets; Future  - Iron & Iron Binding Capacity; Future  - Comprehensive metabolic panel (BMP + Alb, Alk Phos, ALT, AST, Total. Bili, TP); Future  - TSH with free T4 reflex; Future  - CBC with platelets  - Iron & Iron Binding Capacity  - Comprehensive metabolic panel (BMP + Alb, Alk Phos, ALT, AST, Total. Bili, TP)  - TSH with free T4 reflex  - T4 free          BMI  Estimated body mass index is 36.17 kg/m  as calculated from the following:    Height as of this encounter: 1.6 m (5' 3\").    Weight as of this encounter: 92.6 kg (204 lb 3.2 oz).   Weight management plan: Discussed healthy diet and exercise guidelines        Signed Electronically by: Gely Kelly PA-C    "

## 2025-04-23 NOTE — TELEPHONE ENCOUNTER
Provider Response to 2nd Level Triage Request    I have reviewed the RN documentation. My recommendation is:  Gely Kelly said she would see, can put on schedule at 12:40 if she is interested in being seen.

## 2025-04-28 ENCOUNTER — HOSPITAL ENCOUNTER (EMERGENCY)
Facility: CLINIC | Age: 25
Discharge: HOME | End: 2025-04-28
Attending: FAMILY MEDICINE | Admitting: FAMILY MEDICINE
Payer: COMMERCIAL

## 2025-04-28 VITALS
SYSTOLIC BLOOD PRESSURE: 121 MMHG | WEIGHT: 202 LBS | HEART RATE: 93 BPM | DIASTOLIC BLOOD PRESSURE: 83 MMHG | OXYGEN SATURATION: 98 % | TEMPERATURE: 98.1 F | RESPIRATION RATE: 18 BRPM | BODY MASS INDEX: 35.78 KG/M2

## 2025-04-28 DIAGNOSIS — M62.830 LUMBAR PARASPINAL MUSCLE SPASM: ICD-10-CM

## 2025-04-28 LAB
ALBUMIN UR-MCNC: NEGATIVE MG/DL
APPEARANCE UR: CLEAR
BACTERIA #/AREA URNS HPF: ABNORMAL /HPF
BILIRUB UR QL STRIP: NEGATIVE
COLOR UR AUTO: ABNORMAL
GLUCOSE UR STRIP-MCNC: NEGATIVE MG/DL
HCG UR QL: NEGATIVE
HGB UR QL STRIP: ABNORMAL
KETONES UR STRIP-MCNC: NEGATIVE MG/DL
LEUKOCYTE ESTERASE UR QL STRIP: NEGATIVE
MUCOUS THREADS #/AREA URNS LPF: PRESENT /LPF
NITRATE UR QL: NEGATIVE
PH UR STRIP: 6.5 [PH] (ref 5–7)
RBC URINE: 37 /HPF
SP GR UR STRIP: 1.03 (ref 1–1.03)
SQUAMOUS EPITHELIAL: 2 /HPF
UROBILINOGEN UR STRIP-MCNC: NORMAL MG/DL
WBC URINE: <1 /HPF

## 2025-04-28 PROCEDURE — 99283 EMERGENCY DEPT VISIT LOW MDM: CPT | Performed by: FAMILY MEDICINE

## 2025-04-28 PROCEDURE — 81003 URINALYSIS AUTO W/O SCOPE: CPT | Performed by: FAMILY MEDICINE

## 2025-04-28 PROCEDURE — 81025 URINE PREGNANCY TEST: CPT | Performed by: FAMILY MEDICINE

## 2025-04-28 RX ORDER — LIDOCAINE 4 G/G
1 PATCH TOPICAL EVERY 24 HOURS
Qty: 15 PATCH | Refills: 0 | Status: SHIPPED | OUTPATIENT
Start: 2025-04-28

## 2025-04-28 ASSESSMENT — ENCOUNTER SYMPTOMS
BACK PAIN: 1
WEAKNESS: 0
FREQUENCY: 0
FLANK PAIN: 0
RESPIRATORY NEGATIVE: 1
HEMATURIA: 0
DYSURIA: 0
GASTROINTESTINAL NEGATIVE: 1
CHILLS: 0
PSYCHIATRIC NEGATIVE: 1
FEVER: 0
NUMBNESS: 0

## 2025-04-28 ASSESSMENT — ACTIVITIES OF DAILY LIVING (ADL): ADLS_ACUITY_SCORE: 56

## 2025-04-29 NOTE — DISCHARGE INSTRUCTIONS
Please read and follow the handout(s) instructions. Return, if needed, for increased or worsening symptoms and as directed by the handout(s).     It is important for you to ice the area of pain/spasm. Use the ice for 10-15 minutes every 1-2 hours as needed for the pain. Gently stretch the area after the ice while the area is still cold (see the stretching handout). Swim or walk daily. This will help prevent the muscle from weakening which will eventually cause more risk of spasm/pain. Take your medications as directed only (see the med list). I sent the script(s) to your pharmacy. Return, if needed, for increased symptoms as directed your handout(s).

## 2025-04-29 NOTE — ED PROVIDER NOTES
History     Chief Complaint   Patient presents with    Back Pain     HPI  Lakeshia Phillips is a 25 year old female who presented to the emergency room with her significant other secondary concerns of bilateral low back pain with left greater than right this been present for last several days.  She states that the pain comes and goes and increases with movement.  She denies any fall or injury.  She had a pregnancy delivery 7 months ago did have an epidural injection at that time but had no complications with that until the last few days and wonders if this could be related.  She has not had a fever or chills symptoms.  She denies any radicular symptomatology such as pain, numbness, or weakness into the legs bilaterally.  She states that she was recently diagnosed with hypothyroidism and is being treated for that.  She denies any pain with urination or burning.  She states that she is currently a little late on her menstrual cycle but does not believe she is pregnant.  She denies past history of back pain issues.    Allergies:  Allergies   Allergen Reactions    Blood Transfusion Related (Informational Only) Other (See Comments)     Patient has a history of a clinically significant antibody against RBC antigens.  A delay in compatible RBCs may occur. Anti-C identified at [facility] on 24, Unidentified antibody identified at [facility] on 24, Anti-Jka identified at [facility] on 24.    Tramadol Other (See Comments)     Anxiety/panic attacks       Problem List:    Patient Active Problem List    Diagnosis Date Noted    Acquired hypothyroidism 2025     Priority: Medium    Red blood cell antibody positive 2024     Priority: Medium     (normal spontaneous vaginal delivery) 2024     Priority: Medium        Past Medical History:    Past Medical History:   Diagnosis Date    History of anxiety     History of depression        Past Surgical History:    Past Surgical History:    Procedure Laterality Date    APPENDECTOMY  2002    TONSILLECTOMY      WISDOM TOOTH EXTRACTION         Family History:    Family History   Problem Relation Age of Onset    Miscarriages / Stillbirths Mother     Seizure Disorder Mother     Unknown/Adopted Father     Diabetes Maternal Grandmother     Dementia Maternal Grandmother     Unknown/Adopted Maternal Grandfather     Unknown/Adopted Paternal Grandmother     Unknown/Adopted Paternal Grandfather     No Known Problems Daughter     Attention Deficit Disorder Half-Brother     No Known Problems Half-Brother     No Known Problems Half-Brother     Unknown/Adopted Half-Sister        Social History:  Marital Status:   [2]  Social History     Tobacco Use    Smoking status: Former     Current packs/day: 0.00     Types: Cigarettes     Quit date:      Years since quittin.3    Smokeless tobacco: Never   Vaping Use    Vaping status: Former    Substances: Nicotine   Substance Use Topics    Alcohol use: Not Currently    Drug use: Not Currently     Types: Marijuana        Medications:    Lidocaine (LIDOCARE) 4 % Patch  docusate sodium (COLACE) 100 MG capsule  ferrous sulfate (FEROSUL) 325 (65 Fe) MG tablet  ibuprofen (ADVIL/MOTRIN) 800 MG tablet  lanolin ointment  levothyroxine (SYNTHROID/LEVOTHROID) 50 MCG tablet  permethrin (ELIMITE) 5 % external cream  Prenatal Vit-Fe Fumarate-FA (PRENATAL MULTIVITAMIN  PLUS IRON) 27-1 MG TABS          Review of Systems   Constitutional:  Negative for chills and fever.   Respiratory: Negative.     Gastrointestinal: Negative.    Genitourinary:  Negative for dysuria, flank pain, frequency, hematuria, pelvic pain, vaginal bleeding, vaginal discharge and vaginal pain.   Musculoskeletal:  Positive for back pain (lumbar area).   Neurological:  Negative for weakness and numbness.   Psychiatric/Behavioral: Negative.     All other systems reviewed and are negative.      Physical Exam   BP: 121/83  Pulse: 93  Temp: 98.1  F (36.7   C)  Resp: 18  Weight: 91.6 kg (202 lb)  SpO2: 98 %      Physical Exam  Vitals and nursing note reviewed. Exam conducted with a chaperone present ().   Constitutional:       General: She is in acute distress (Mild - low back).   HENT:      Head: Atraumatic.   Cardiovascular:      Rate and Rhythm: Normal rate.   Pulmonary:      Effort: Pulmonary effort is normal.      Breath sounds: Normal breath sounds.   Abdominal:      Palpations: Abdomen is soft.   Musculoskeletal:         General: Tenderness present.      Lumbar back: Spasms (Primary on the left quadratus lumborum and para lumbar musculature on palpation.) and tenderness present. No swelling, deformity, signs of trauma, lacerations or bony tenderness. Negative right straight leg raise test and negative left straight leg raise test.   Skin:     Capillary Refill: Capillary refill takes less than 2 seconds.   Neurological:      Mental Status: She is alert and oriented to person, place, and time.   Psychiatric:         Mood and Affect: Mood normal.         Behavior: Behavior normal.         ED Course        Procedures              Critical Care time:  none     None         Results for orders placed or performed during the hospital encounter of 04/28/25 (from the past 24 hours)   UA with Microscopic reflex to Culture    Specimen: Urine, NOS   Result Value Ref Range    Color Urine Light Yellow Colorless, Straw, Light Yellow, Yellow    Appearance Urine Clear Clear    Glucose Urine Negative Negative mg/dL    Bilirubin Urine Negative Negative    Ketones Urine Negative Negative mg/dL    Specific Gravity Urine 1.027 1.003 - 1.035    Blood Urine Moderate (A) Negative    pH Urine 6.5 5.0 - 7.0    Protein Albumin Urine Negative Negative mg/dL    Urobilinogen Urine Normal Normal mg/dL    Nitrite Urine Negative Negative    Leukocyte Esterase Urine Negative Negative    Bacteria Urine Few (A) None Seen /HPF    Mucus Urine Present (A) None Seen /LPF    RBC Urine 37 (H) <=2  /HPF    WBC Urine <1 <=5 /HPF    Squamous Epithelials Urine 2 (H) <=1 /HPF    Narrative    Urine Culture not indicated   HCG qualitative urine (UPT)   Result Value Ref Range    hCG Urine Qualitative Negative Negative       Medications - No data to display    Assessments & Plan (with Medical Decision Making)  25-year-old to the ER secondary concerns of low back pain issues left greater than right.  Exam findings consistent with para lumbar muscle spasm.  Urinalysis and pregnancy test relatively reassuring.  She had a few red blood cells present in the urine.  No suggestion of kidney stone by exam.  I did discuss lumbar spasm etiologies and ways of improving her symptoms.  I sent her home with handouts discussing paralumbar muscle spasm as well as some exercises to do.  We discussed use of ice therapy followed by gentle stretching exercises.  Also discussed use of a Lidoderm patch to the area as needed for the pain. The patient has no concerning features to her back pain, and she has no risk factors to suggest a concerning etiology for her back pain.  She is completely neurologically intact in upper and lower extremities. There is no evidence or risk factors for epidural abscess, epidural hematoma, pathologic fracture, cauda equina syndrome, etc. I advised that the patient follow up with her primary care physician in one week or so if her symptoms have not improved.        I have reviewed the nursing notes.    I have reviewed the findings, diagnosis, plan and need for follow up with the patient.           New Prescriptions    LIDOCAINE (LIDOCARE) 4 % PATCH    Place 1 patch over 12 hours onto the skin every 24 hours.            I verbally discussed the findings of the evaluation today in the ER. I have verbally discussed with Lakeshia the suggested treatment(s) as described in the discharge instructions and handouts. I have prescribed the above listed medications and instructed her on appropriate use of these  medications.      I have verbally suggested she follow-up in her clinic or return to the ER for increased symptoms. See the follow-up recommendations documented  in the after visit summary in this visit's EPIC chart.      Disclaimer: This note consists of words and symbols derived from keyboarding and dictation using voice recognition software.  As a result, there may be errors that have gone undetected.  Please consider this when interpreting information found in this note.    Final diagnoses:   Lumbar paraspinal muscle spasm - Left sided spasm greater than right       4/28/2025   [facility] EMERGENCY DEPT       Yordan Flores,   04/28/25 2039

## 2025-05-06 ENCOUNTER — E-VISIT (OUTPATIENT)
Dept: FAMILY MEDICINE | Facility: CLINIC | Age: 25
End: 2025-05-06
Payer: COMMERCIAL

## 2025-05-06 ENCOUNTER — APPOINTMENT (OUTPATIENT)
Dept: LAB | Facility: CLINIC | Age: 25
End: 2025-05-06
Payer: COMMERCIAL

## 2025-05-06 DIAGNOSIS — Z20.818 STREP THROAT EXPOSURE: ICD-10-CM

## 2025-05-06 DIAGNOSIS — J02.9 SORE THROAT: Primary | ICD-10-CM

## 2025-05-06 LAB — S PYO DNA THROAT QL NAA+PROBE: NOT DETECTED

## 2025-05-06 RX ORDER — IBUPROFEN 200 MG
400 TABLET ORAL EVERY 6 HOURS PRN
Qty: 100 TABLET | Refills: 0 | Status: SHIPPED | OUTPATIENT
Start: 2025-05-06

## 2025-05-06 NOTE — PATIENT INSTRUCTIONS
Dear Lakeshia,    After reviewing your responses, I would like you to come in for a swab to make sure we treat you correctly. You will need to schedule a lab visit for this.     The lab is open until at least 6:00 PM today, so you might yet get in. Otherwise I would use the supportive cares and get in tomorrow morning first thing. Lab appointments are not available at most locations on the weekends. If no Lab Only appointment is available, you should be seen in any of our convenient Urgent Care Centers for an in person visit, which can be found on our website here.    You will receive instructions with your results in [employer] once they are available.     If your symptoms worsen, you develop difficulty breathing, difficulty with drinking enough to stay hydrated, difficulty swallowing your saliva or have fevers for more than 5 days, please contact your primary care provider for an appointment or visit an Urgent Care Center to be seen.      Thanks again for choosing us as your health care partner.   Tena López, DO  Sore Throat: Care Instructions  Overview     Infection by bacteria or a virus causes most sore throats. Cigarette smoke, dry air, air pollution, allergies, and yelling can also cause a sore throat. Sore throats can be painful and annoying. Fortunately, most sore throats go away on their own. If you have a bacterial infection, your doctor may prescribe antibiotics.  Follow-up care is a key part of your treatment and safety. Be sure to make and go to all appointments, and call your doctor if you are having problems. It's also a good idea to know your test results and keep a list of the medicines you take.  How can you care for yourself at home?  If your doctor prescribed antibiotics, take them as directed. Do not stop taking them just because you feel better. You need to take the full course of antibiotics.  Gargle with warm salt water several times a day to help reduce swelling and relieve pain. Mix 1/2  "teaspoon of salt in 1 cup of warm water.  Take an over-the-counter pain medicine, such as acetaminophen (Tylenol), ibuprofen (Advil, Motrin), or naproxen (Aleve). Read and follow all instructions on the label.  Be careful when taking over-the-counter cold or flu medicines and Tylenol at the same time. Many of these medicines have acetaminophen, which is Tylenol. Read the labels to make sure that you are not taking more than the recommended dose. Too much acetaminophen (Tylenol) can be harmful.  Drink plenty of fluids. Fluids may help soothe an irritated throat. Hot fluids, such as tea or soup, may help decrease throat pain.  Use over-the-counter throat lozenges to soothe pain. Regular cough drops or hard candy may also help. These should not be given to young children because of the risk of choking.  Do not smoke or allow others to smoke around you. If you need help quitting, talk to your doctor about stop-smoking programs and medicines. These can increase your chances of quitting for good.  Use a vaporizer or humidifier to add moisture to your bedroom. Follow the directions for cleaning the machine.  When should you call for help?   Call your doctor now or seek immediate medical care if:    You have trouble breathing.     Your sore throat gets much worse on one side.     You have new or worse trouble swallowing.     You have a new or higher fever.   Watch closely for changes in your health, and be sure to contact your doctor if you do not get better as expected.  Where can you learn more?  Go to https://www.[employer].net/patiented  Enter U420 in the search box to learn more about \"Sore Throat: Care Instructions.\"  Current as of: October 27, 2024  Content Version: 14.4    3193-2032 [email or web address].   Care instructions adapted under license by your healthcare professional. If you have questions about a medical condition or this instruction, always ask your healthcare professional. [email or web address] " disclaims any warranty or liability for your use of this information.    Strep Throat: Care Instructions  Overview     Strep throat is a bacterial infection that causes sudden, severe sore throat and fever. Symptoms may include red, swollen tonsils that may have white or yellow patches. A strep test may be needed to tell if the sore throat is caused by strep bacteria. Treatment can help ease symptoms and may prevent future problems.  Follow-up care is a key part of your treatment and safety. Be sure to make and go to all appointments, and call your doctor if you are having problems. It's also a good idea to know your test results and keep a list of the medicines you take.  How can you care for yourself at home?  Take your antibiotics as directed. Do not stop taking them just because you feel better. You need to take the full course of antibiotics.  Strep throat can spread to others until 24 hours after you begin taking antibiotics. During this time, avoid contact with other people at work, school, or home, especially infants and children. Do not sneeze or cough on others, and wash your hands often. Keep your drinking glass and eating utensils separate from those of others. Wash these items well in hot, soapy water.  Gargle with warm salt water several times a day to help reduce swelling and relieve pain. Mix 1/2 teaspoon of salt in 1 cup of warm water.  Take an over-the-counter pain medication, such as acetaminophen (Tylenol), ibuprofen (Advil, Motrin), or naproxen (Aleve). Read and follow all instructions on the label.  Try an over-the-counter anesthetic throat spray or throat lozenges, which may help relieve throat pain.  Drink plenty of fluids. Fluids may help soothe an irritated throat. Hot fluids, such as tea or soup, may help your throat feel better.  Eat soft solids and drink plenty of liquids. Flavored ice pops, ice cream, scrambled eggs, sherbet, and gelatin dessert (such as Jell-O) may also soothe the  "throat.  Get lots of rest.  If you smoke, try to quit. If you can't quit, cut back as much as you can. Smoking can interfere with healing. If you need help quitting, talk to your doctor about stop-smoking programs and medicines. These can increase your chances of quitting for good.  Use a vaporizer or humidifier to add moisture to the air where you sleep. Follow the directions for cleaning the machine.  When should you call for help?   Call your doctor now or seek immediate medical care if:    You have new or worse symptoms of infection, such as:  A new or higher fever.  A fever with a stiff neck or severe headache.  New or worse trouble swallowing.  Pain that becomes much worse on one side of your throat.   Watch closely for changes in your health, and be sure to contact your doctor if:    You are not getting better after 2 days (48 hours) after taking an antibiotic.   Where can you learn more?  Go to https://www.[employer].net/patiented  Enter K625 in the search box to learn more about \"Strep Throat: Care Instructions.\"  Current as of: October 27, 2024  Content Version: 14.4    6296-9121 [employer].   Care instructions adapted under license by your healthcare professional. If you have questions about a medical condition or this instruction, always ask your healthcare professional. [employer] disclaims any warranty or liability for your use of this information.    "

## 2025-05-07 ENCOUNTER — RESULTS FOLLOW-UP (OUTPATIENT)
Dept: FAMILY MEDICINE | Facility: CLINIC | Age: 25
End: 2025-05-07

## 2025-05-28 ENCOUNTER — TELEPHONE (OUTPATIENT)
Dept: FAMILY MEDICINE | Facility: CLINIC | Age: 25
End: 2025-05-28
Payer: COMMERCIAL

## 2025-05-28 NOTE — TELEPHONE ENCOUNTER
Do you know of an appointment time that might work or should I just schedule her with another provider?

## 2025-05-28 NOTE — TELEPHONE ENCOUNTER
Reason for Call:  Appointment Request    Patient requesting this type of appt: Chronic Diease Management/Medication/Follow-Up    Requested provider: Gely Kelly PA-C    Reason patient unable to be scheduled: Not within requested timeframe    When does patient want to be seen/preferred time: 3-7days    Comments: 6wk Follow up from [facility] 4/23/25     Could we send this information to you in [employer] or would you prefer to receive a phone call?:   Patient would like to be contacted via [employer]    Call taken on 5/28/2025 at 1:25 PM by Chantel Torres

## 2025-06-03 ENCOUNTER — RESULTS FOLLOW-UP (OUTPATIENT)
Dept: FAMILY MEDICINE | Facility: CLINIC | Age: 25
End: 2025-06-03

## 2025-06-03 ENCOUNTER — OFFICE VISIT (OUTPATIENT)
Dept: FAMILY MEDICINE | Facility: CLINIC | Age: 25
End: 2025-06-03
Payer: COMMERCIAL

## 2025-06-03 VITALS
DIASTOLIC BLOOD PRESSURE: 74 MMHG | TEMPERATURE: 97 F | HEIGHT: 63 IN | BODY MASS INDEX: 35.22 KG/M2 | OXYGEN SATURATION: 97 % | WEIGHT: 198.8 LBS | SYSTOLIC BLOOD PRESSURE: 108 MMHG | RESPIRATION RATE: 20 BRPM | HEART RATE: 85 BPM

## 2025-06-03 DIAGNOSIS — E03.9 ACQUIRED HYPOTHYROIDISM: Primary | ICD-10-CM

## 2025-06-03 DIAGNOSIS — F41.9 ANXIETY: ICD-10-CM

## 2025-06-03 LAB
T4 FREE SERPL-MCNC: 0.47 NG/DL (ref 0.9–1.7)
TSH SERPL DL<=0.005 MIU/L-ACNC: 32.01 UIU/ML (ref 0.3–4.2)

## 2025-06-03 PROCEDURE — 84439 ASSAY OF FREE THYROXINE: CPT | Performed by: PHYSICIAN ASSISTANT

## 2025-06-03 PROCEDURE — 84443 ASSAY THYROID STIM HORMONE: CPT | Performed by: PHYSICIAN ASSISTANT

## 2025-06-03 PROCEDURE — G2211 COMPLEX E/M VISIT ADD ON: HCPCS | Performed by: PHYSICIAN ASSISTANT

## 2025-06-03 PROCEDURE — 36415 COLL VENOUS BLD VENIPUNCTURE: CPT | Performed by: PHYSICIAN ASSISTANT

## 2025-06-03 PROCEDURE — 99213 OFFICE O/P EST LOW 20 MIN: CPT | Performed by: PHYSICIAN ASSISTANT

## 2025-06-03 RX ORDER — ESCITALOPRAM OXALATE 10 MG/1
10 TABLET ORAL DAILY
Qty: 90 TABLET | Refills: 1 | Status: SHIPPED | OUTPATIENT
Start: 2025-06-03

## 2025-06-03 RX ORDER — LEVOTHYROXINE SODIUM 100 UG/1
100 TABLET ORAL
Qty: 90 TABLET | Refills: 1 | Status: SHIPPED | OUTPATIENT
Start: 2025-06-03

## 2025-06-03 ASSESSMENT — PAIN SCALES - GENERAL: PAINLEVEL_OUTOF10: NO PAIN (0)

## 2025-06-03 NOTE — PROGRESS NOTES
Subjective   Lakeshia is a 25 year old, presenting for the following health issues:  Recheck Medication, Weight Problem (Weight gain), Hair Loss, dry skin , and Anxiety        6/3/2025    10:45 AM   Additional Questions   Roomed by Miky   Accompanied by children     History of Present Illness       Hypothyroidism:     Since last visit, patient describes the following symptoms::  Anxiety, Dry skin, Hair loss and Weight gain    Weight gain::  Less than 5 lbs.    She eats 2-3 servings of fruits and vegetables daily.She consumes 2 sweetened beverage(s) daily.She exercises with enough effort to increase her heart rate 10 to 19 minutes per day.  She exercises with enough effort to increase her heart rate 3 or less days per week.   She is taking medications regularly.   Lakeshia Phillips, a 25-year-old female presents today for follow-up of newly diagnossed hypothyroidism. She reported feeling an improvement in her energy levels since starting thyroid replacement medication. 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 She noted that her leg pain has also subsided. Lakeshia mentioned a history of anxiety and depression for which she was medicated during her younger years. Her mother decided to discontinue the medication, and since then, she has been managing her anxiety without pharmaceutical intervention. She expressed uncertainty about whether ignoring her anxiety is beneficial and is contemplating the potential benefits of resuming medication. Lakeshia recalled being prescribed Lexapro (escitalopram) at a low dose of 10 mg during her teenage years, which she took for both anxiety and depression. She is otherwise doing well.      Review of Systems  Constitutional, HEENT, cardiovascular, pulmonary, GI, , musculoskeletal, neuro, skin, endocrine and psych systems are negative,  "except as otherwise noted.      Objective    /74   Pulse 85   Temp 97  F (36.1  C) (Temporal)   Resp 20   Ht 1.6 m (5' 3\")   Wt 90.2 kg (198 lb 12.8 oz)   LMP 05/02/2025 (Approximate)   SpO2 97%   Breastfeeding Yes   BMI 35.22 kg/m    Body mass index is 35.22 kg/m .  Physical Exam   GENERAL: alert and no distress  MS: no gross musculoskeletal defects noted, no edema  SKIN: no suspicious lesions or rashes  PSYCH: mentation appears normal, affect normal/bright, and fatigued        Assessment & Plan     Acquired hypothyroidism  Labs ordered today and dose adjustment pending these results. Advised follow-up in 3 months.   - TSH with free T4 reflex; Future  - TSH with free T4 reflex  - T4 free  - levothyroxine (SYNTHROID/LEVOTHROID) 100 MCG tablet; Take 1 tablet (100 mcg) by mouth every morning (before breakfast).    Anxiety  Will restart Lexapro as below.  Appropriate use, side effects and dose titration if needed discussed. Will follow-up in 3 months, sooner if needed.   - escitalopram (LEXAPRO) 10 MG tablet; Take 1 tablet (10 mg) by mouth daily.    The longitudinal plan of care for the diagnosis(es)/condition(s) as documented were addressed during this visit. Due to the added complexity in care, I will continue to support Lakeshia in the subsequent management and with ongoing continuity of care.    Signed Electronically by: Gely Kelly PA-C    "

## 2025-08-13 ENCOUNTER — MYC MEDICAL ADVICE (OUTPATIENT)
Dept: FAMILY MEDICINE | Facility: CLINIC | Age: 25
End: 2025-08-13
Payer: COMMERCIAL

## 2025-08-13 DIAGNOSIS — E03.9 ACQUIRED HYPOTHYROIDISM: Primary | ICD-10-CM

## 2025-09-03 ENCOUNTER — OFFICE VISIT (OUTPATIENT)
Dept: FAMILY MEDICINE | Facility: CLINIC | Age: 25
End: 2025-09-03
Attending: PHYSICIAN ASSISTANT
Payer: COMMERCIAL

## 2025-09-03 VITALS
OXYGEN SATURATION: 100 % | TEMPERATURE: 97.1 F | SYSTOLIC BLOOD PRESSURE: 106 MMHG | RESPIRATION RATE: 16 BRPM | HEART RATE: 70 BPM | BODY MASS INDEX: 35.79 KG/M2 | WEIGHT: 202 LBS | DIASTOLIC BLOOD PRESSURE: 72 MMHG | HEIGHT: 63 IN

## 2025-09-03 DIAGNOSIS — F41.9 ANXIETY: ICD-10-CM

## 2025-09-03 DIAGNOSIS — E03.9 ACQUIRED HYPOTHYROIDISM: ICD-10-CM

## 2025-09-03 LAB
T4 FREE SERPL-MCNC: 0.43 NG/DL (ref 0.9–1.7)
TSH SERPL DL<=0.005 MIU/L-ACNC: 34.77 UIU/ML (ref 0.3–4.2)

## 2025-09-03 PROCEDURE — G2211 COMPLEX E/M VISIT ADD ON: HCPCS | Performed by: PHYSICIAN ASSISTANT

## 2025-09-03 PROCEDURE — 36415 COLL VENOUS BLD VENIPUNCTURE: CPT | Performed by: PHYSICIAN ASSISTANT

## 2025-09-03 PROCEDURE — 99213 OFFICE O/P EST LOW 20 MIN: CPT | Performed by: PHYSICIAN ASSISTANT

## 2025-09-03 PROCEDURE — 84439 ASSAY OF FREE THYROXINE: CPT | Performed by: PHYSICIAN ASSISTANT

## 2025-09-03 PROCEDURE — 84443 ASSAY THYROID STIM HORMONE: CPT | Performed by: PHYSICIAN ASSISTANT

## 2025-09-03 RX ORDER — ESCITALOPRAM OXALATE 10 MG/1
10 TABLET ORAL DAILY
Qty: 90 TABLET | Refills: 1 | Status: SHIPPED | OUTPATIENT
Start: 2025-09-03

## 2025-09-03 RX ORDER — LEVOTHYROXINE SODIUM 150 UG/1
150 TABLET ORAL
Qty: 90 TABLET | Refills: 0 | Status: SHIPPED | OUTPATIENT
Start: 2025-09-03

## 2025-09-03 ASSESSMENT — ANXIETY QUESTIONNAIRES
3. WORRYING TOO MUCH ABOUT DIFFERENT THINGS: NOT AT ALL
8. IF YOU CHECKED OFF ANY PROBLEMS, HOW DIFFICULT HAVE THESE MADE IT FOR YOU TO DO YOUR WORK, TAKE CARE OF THINGS AT HOME, OR GET ALONG WITH OTHER PEOPLE?: NOT DIFFICULT AT ALL
4. TROUBLE RELAXING: NOT AT ALL
IF YOU CHECKED OFF ANY PROBLEMS ON THIS QUESTIONNAIRE, HOW DIFFICULT HAVE THESE PROBLEMS MADE IT FOR YOU TO DO YOUR WORK, TAKE CARE OF THINGS AT HOME, OR GET ALONG WITH OTHER PEOPLE: NOT DIFFICULT AT ALL
GAD7 TOTAL SCORE: 1
7. FEELING AFRAID AS IF SOMETHING AWFUL MIGHT HAPPEN: NOT AT ALL
GAD7 TOTAL SCORE: 1
7. FEELING AFRAID AS IF SOMETHING AWFUL MIGHT HAPPEN: NOT AT ALL
1. FEELING NERVOUS, ANXIOUS, OR ON EDGE: NOT AT ALL
GAD7 TOTAL SCORE: 1
6. BECOMING EASILY ANNOYED OR IRRITABLE: SEVERAL DAYS
5. BEING SO RESTLESS THAT IT IS HARD TO SIT STILL: NOT AT ALL
2. NOT BEING ABLE TO STOP OR CONTROL WORRYING: NOT AT ALL

## 2025-09-03 ASSESSMENT — PAIN SCALES - GENERAL: PAINLEVEL_OUTOF10: NO PAIN (0)
